# Patient Record
Sex: MALE | Race: BLACK OR AFRICAN AMERICAN | NOT HISPANIC OR LATINO | URBAN - METROPOLITAN AREA
[De-identification: names, ages, dates, MRNs, and addresses within clinical notes are randomized per-mention and may not be internally consistent; named-entity substitution may affect disease eponyms.]

---

## 2017-01-04 ENCOUNTER — EMERGENCY (EMERGENCY)
Facility: HOSPITAL | Age: 47
LOS: 1 days | Discharge: HOME | End: 2017-01-04
Attending: EMERGENCY MEDICINE
Payer: COMMERCIAL

## 2017-01-04 VITALS
DIASTOLIC BLOOD PRESSURE: 80 MMHG | RESPIRATION RATE: 16 BRPM | HEART RATE: 86 BPM | TEMPERATURE: 98 F | SYSTOLIC BLOOD PRESSURE: 121 MMHG | OXYGEN SATURATION: 99 %

## 2017-01-04 VITALS — WEIGHT: 250 LBS | HEIGHT: 70 IN

## 2017-01-04 PROCEDURE — 99282 EMERGENCY DEPT VISIT SF MDM: CPT

## 2017-01-04 PROCEDURE — 99283 EMERGENCY DEPT VISIT LOW MDM: CPT

## 2017-01-04 NOTE — ED STATDOCS - OBJECTIVE STATEMENT
47 y/o M pt presents to ED c/o diarrhea. Pt was seen last week for same symptoms. Pt tried going back to work, but was told to come to ED to get evaluated and get a work note. No fevers.

## 2017-01-04 NOTE — ED ADULT TRIAGE NOTE - CHIEF COMPLAINT QUOTE
pt reports abdominal pain ,nausea vomiting and diarrhea since last week. pt reports being evaluated in ED on 12/27/2016 for the same symptoms.

## 2017-01-04 NOTE — ED STATDOCS - NS ED MD SCRIBE ATTENDING SCRIBE SECTIONS
PHYSICAL EXAM/DISPOSITION/VITAL SIGNS( Pullset)/HISTORY OF PRESENT ILLNESS/PAST MEDICAL/SURGICAL/SOCIAL HISTORY/HIV/REVIEW OF SYSTEMS

## 2018-04-06 ENCOUNTER — INPATIENT (INPATIENT)
Facility: HOSPITAL | Age: 48
LOS: 2 days | Discharge: HOME | DRG: 639 | End: 2018-04-09
Attending: INTERNAL MEDICINE | Admitting: FAMILY MEDICINE
Payer: MEDICAID

## 2018-04-06 VITALS — WEIGHT: 250 LBS | HEIGHT: 70 IN

## 2018-04-06 LAB
ALBUMIN SERPL ELPH-MCNC: 4.7 G/DL — SIGNIFICANT CHANGE UP (ref 3.3–5.2)
ALP SERPL-CCNC: 115 U/L — SIGNIFICANT CHANGE UP (ref 40–120)
ALT FLD-CCNC: 20 U/L — SIGNIFICANT CHANGE UP
ANION GAP SERPL CALC-SCNC: 26 MMOL/L — HIGH (ref 5–17)
AST SERPL-CCNC: 16 U/L — SIGNIFICANT CHANGE UP
BASOPHILS # BLD AUTO: 0 K/UL — SIGNIFICANT CHANGE UP (ref 0–0.2)
BASOPHILS NFR BLD AUTO: 0.1 % — SIGNIFICANT CHANGE UP (ref 0–2)
BILIRUB SERPL-MCNC: 0.4 MG/DL — SIGNIFICANT CHANGE UP (ref 0.4–2)
BUN SERPL-MCNC: 21 MG/DL — HIGH (ref 8–20)
CALCIUM SERPL-MCNC: 10.4 MG/DL — HIGH (ref 8.6–10.2)
CHLORIDE SERPL-SCNC: 90 MMOL/L — LOW (ref 98–107)
CK MB CFR SERPL CALC: 1.9 NG/ML — SIGNIFICANT CHANGE UP (ref 0–6.7)
CK SERPL-CCNC: 215 U/L — HIGH (ref 30–200)
CO2 SERPL-SCNC: 21 MMOL/L — LOW (ref 22–29)
CREAT SERPL-MCNC: 1.48 MG/DL — HIGH (ref 0.5–1.3)
EOSINOPHIL # BLD AUTO: 0 K/UL — SIGNIFICANT CHANGE UP (ref 0–0.5)
EOSINOPHIL NFR BLD AUTO: 0.1 % — SIGNIFICANT CHANGE UP (ref 0–6)
GLUCOSE SERPL-MCNC: 444 MG/DL — HIGH (ref 70–115)
HCT VFR BLD CALC: 47.2 % — SIGNIFICANT CHANGE UP (ref 42–52)
HGB BLD-MCNC: 16.2 G/DL — SIGNIFICANT CHANGE UP (ref 14–18)
LIDOCAIN IGE QN: 64 U/L — HIGH (ref 22–51)
LYMPHOCYTES # BLD AUTO: 1.1 K/UL — SIGNIFICANT CHANGE UP (ref 1–4.8)
LYMPHOCYTES # BLD AUTO: 15.9 % — LOW (ref 20–55)
MCHC RBC-ENTMCNC: 29.5 PG — SIGNIFICANT CHANGE UP (ref 27–31)
MCHC RBC-ENTMCNC: 34.3 G/DL — SIGNIFICANT CHANGE UP (ref 32–36)
MCV RBC AUTO: 85.8 FL — SIGNIFICANT CHANGE UP (ref 80–94)
MONOCYTES # BLD AUTO: 0.3 K/UL — SIGNIFICANT CHANGE UP (ref 0–0.8)
MONOCYTES NFR BLD AUTO: 4.7 % — SIGNIFICANT CHANGE UP (ref 3–10)
NEUTROPHILS # BLD AUTO: 5.4 K/UL — SIGNIFICANT CHANGE UP (ref 1.8–8)
NEUTROPHILS NFR BLD AUTO: 79.1 % — HIGH (ref 37–73)
PLATELET # BLD AUTO: 228 K/UL — SIGNIFICANT CHANGE UP (ref 150–400)
POTASSIUM SERPL-MCNC: 5.1 MMOL/L — SIGNIFICANT CHANGE UP (ref 3.5–5.3)
POTASSIUM SERPL-SCNC: 5.1 MMOL/L — SIGNIFICANT CHANGE UP (ref 3.5–5.3)
PROT SERPL-MCNC: 8.7 G/DL — SIGNIFICANT CHANGE UP (ref 6.6–8.7)
RBC # BLD: 5.5 M/UL — SIGNIFICANT CHANGE UP (ref 4.6–6.2)
RBC # FLD: 12.2 % — SIGNIFICANT CHANGE UP (ref 11–15.6)
SODIUM SERPL-SCNC: 137 MMOL/L — SIGNIFICANT CHANGE UP (ref 135–145)
TROPONIN T SERPL-MCNC: <0.01 NG/ML — SIGNIFICANT CHANGE UP (ref 0–0.06)
WBC # BLD: 6.8 K/UL — SIGNIFICANT CHANGE UP (ref 4.8–10.8)
WBC # FLD AUTO: 6.8 K/UL — SIGNIFICANT CHANGE UP (ref 4.8–10.8)

## 2018-04-06 PROCEDURE — 99285 EMERGENCY DEPT VISIT HI MDM: CPT

## 2018-04-06 PROCEDURE — 71046 X-RAY EXAM CHEST 2 VIEWS: CPT | Mod: 26

## 2018-04-06 PROCEDURE — 93010 ELECTROCARDIOGRAM REPORT: CPT

## 2018-04-06 RX ORDER — SODIUM CHLORIDE 9 MG/ML
1000 INJECTION INTRAMUSCULAR; INTRAVENOUS; SUBCUTANEOUS ONCE
Qty: 0 | Refills: 0 | Status: COMPLETED | OUTPATIENT
Start: 2018-04-06 | End: 2018-04-06

## 2018-04-06 RX ORDER — INSULIN GLARGINE 100 [IU]/ML
15 INJECTION, SOLUTION SUBCUTANEOUS ONCE
Qty: 0 | Refills: 0 | Status: COMPLETED | OUTPATIENT
Start: 2018-04-06 | End: 2018-04-06

## 2018-04-06 RX ORDER — INSULIN HUMAN 100 [IU]/ML
5 INJECTION, SOLUTION SUBCUTANEOUS
Qty: 100 | Refills: 0 | Status: DISCONTINUED | OUTPATIENT
Start: 2018-04-06 | End: 2018-04-07

## 2018-04-06 RX ADMIN — SODIUM CHLORIDE 1000 MILLILITER(S): 9 INJECTION INTRAMUSCULAR; INTRAVENOUS; SUBCUTANEOUS at 17:47

## 2018-04-06 RX ADMIN — INSULIN HUMAN 5 UNIT(S)/HR: 100 INJECTION, SOLUTION SUBCUTANEOUS at 20:41

## 2018-04-06 RX ADMIN — SODIUM CHLORIDE 2000 MILLILITER(S): 9 INJECTION INTRAMUSCULAR; INTRAVENOUS; SUBCUTANEOUS at 20:42

## 2018-04-06 RX ADMIN — SODIUM CHLORIDE 2000 MILLILITER(S): 9 INJECTION INTRAMUSCULAR; INTRAVENOUS; SUBCUTANEOUS at 17:47

## 2018-04-06 RX ADMIN — INSULIN GLARGINE 15 UNIT(S): 100 INJECTION, SOLUTION SUBCUTANEOUS at 20:41

## 2018-04-06 NOTE — ED PROVIDER NOTE - PROGRESS NOTE DETAILS
PT. was seen and evaluated by ICU PA-Beck Montejo. He agreed with IV fluids and insulin drip. Also recommended Lantus SC. PT. will have repeat chemistry to evaluate Anion gap. PT. well appearing. NO distress. Awaiting repeat labs. Luis: received sign out; labs improving; insulin gtt turned off; d/w hospitalist will admit to floor

## 2018-04-06 NOTE — ED ADULT NURSE REASSESSMENT NOTE - NS ED NURSE REASSESS COMMENT FT1
Patient resting in bed, awake, alert and oriented X3, resp even/unlabored, lungs CTAB, patient C/O mild dizziness, BS @[phone], awaiting for dispo, will continue to monitor patient.

## 2018-04-06 NOTE — ED ADULT NURSE REASSESSMENT NOTE - NS ED NURSE REASSESS COMMENT FT1
patient aox4 comfortable in appearance. respirations clear equal and unlabored. heart sounds s1 s2  WDL, abdomen soft nontender + bowel sounds all 4 quadrants, moves all extremities. + pulse all 4 extremities. + sensation all 4 extremities. patient and family updated on plan of care. patient and family educated on hourly rounding procedures and understands call bell system.

## 2018-04-06 NOTE — ED STATDOCS - OBJECTIVE STATEMENT
48 yo M pmh DM2 ( diet controlled) presented to ED with multiple vague complaints.   Pt reports blurry vision, nonspecific CP, Abd pain,  body aches, and urinary frequency x 2 weeks. Pt states that he believes is "diabetes is flaring up"  Pt states he was on Metformin 10 years ago but took himself off.   Pt denies any HA, neck pain, fevers/chills, palp, SOB, or  diaphoresis.  + Smoker + occasional ETOH and + occasional THC + N/V. No diarrhea, but reports constipation. No cough. Accucheck 400. Will send to main for further eval and monitoring

## 2018-04-06 NOTE — ED PROVIDER NOTE - OBJECTIVE STATEMENT
48 y/o M pt with hx of DM presents to ED c/o polydipsia, blurred vision, polyuria, dizziness, HA and dry mouth that began 3-4 weeks ago. Pt was on metformin and stopped taking it 9 years ago. He has tried to control DM with diet. Pt states he has had mild CP for months. Vomiting x 2 weeks, no emesis today. Denies abd pain and SOB. No further complaints at this time. 46 y/o M pt with hx of DM presents to ED c/o polydipsia, polyuria, blurred vision, dizziness, HA and dry mouth that began 3-4 weeks ago. Pt notes intermittent emesis for 2 weeks but did not have any episodes today. Pt states he has had mild CP for months. Pt was on metformin and stopped taking it 9 years ago. He has tried to control DM with diet.  Denies abdominal pain and SOB. No further complaints at this time.

## 2018-04-06 NOTE — ED STATDOCS - ATTENDING CONTRIBUTION TO CARE
Dr. Lopes : I have personally seen and examined this patient at the bedside. I have fully participated in the care of this patient. I have reviewed all pertinent clinical information, including history, physical exam, plan and the PA's note and agree except as noted. Dr. Lopes : I have reviewed all pertinent clinical information, including history, physical exam, plan and the PA's note and agree except as noted.

## 2018-04-07 ENCOUNTER — TRANSCRIPTION ENCOUNTER (OUTPATIENT)
Age: 48
End: 2018-04-07

## 2018-04-07 DIAGNOSIS — E11.65 TYPE 2 DIABETES MELLITUS WITH HYPERGLYCEMIA: ICD-10-CM

## 2018-04-07 DIAGNOSIS — E86.0 DEHYDRATION: ICD-10-CM

## 2018-04-07 DIAGNOSIS — E13.10 OTHER SPECIFIED DIABETES MELLITUS WITH KETOACIDOSIS WITHOUT COMA: ICD-10-CM

## 2018-04-07 DIAGNOSIS — E87.2 ACIDOSIS: ICD-10-CM

## 2018-04-07 DIAGNOSIS — R07.89 OTHER CHEST PAIN: ICD-10-CM

## 2018-04-07 LAB
ACETONE SERPL-MCNC: ABNORMAL
ANION GAP SERPL CALC-SCNC: 16 MMOL/L — SIGNIFICANT CHANGE UP (ref 5–17)
ANION GAP SERPL CALC-SCNC: 18 MMOL/L — HIGH (ref 5–17)
ANION GAP SERPL CALC-SCNC: 19 MMOL/L — HIGH (ref 5–17)
ANION GAP SERPL CALC-SCNC: 19 MMOL/L — HIGH (ref 5–17)
ANION GAP SERPL CALC-SCNC: 20 MMOL/L — HIGH (ref 5–17)
APPEARANCE UR: CLEAR — SIGNIFICANT CHANGE UP
APTT BLD: 26.3 SEC — LOW (ref 27.5–37.4)
BASOPHILS # BLD AUTO: 0 K/UL — SIGNIFICANT CHANGE UP (ref 0–0.2)
BASOPHILS # BLD AUTO: 0 K/UL — SIGNIFICANT CHANGE UP (ref 0–0.2)
BASOPHILS NFR BLD AUTO: 0.2 % — SIGNIFICANT CHANGE UP (ref 0–2)
BASOPHILS NFR BLD AUTO: 0.4 % — SIGNIFICANT CHANGE UP (ref 0–2)
BILIRUB UR-MCNC: NEGATIVE — SIGNIFICANT CHANGE UP
BUN SERPL-MCNC: 12 MG/DL — SIGNIFICANT CHANGE UP (ref 8–20)
BUN SERPL-MCNC: 14 MG/DL — SIGNIFICANT CHANGE UP (ref 8–20)
BUN SERPL-MCNC: 15 MG/DL — SIGNIFICANT CHANGE UP (ref 8–20)
BUN SERPL-MCNC: 15 MG/DL — SIGNIFICANT CHANGE UP (ref 8–20)
BUN SERPL-MCNC: 16 MG/DL — SIGNIFICANT CHANGE UP (ref 8–20)
CALCIUM SERPL-MCNC: 8.4 MG/DL — LOW (ref 8.6–10.2)
CALCIUM SERPL-MCNC: 8.6 MG/DL — SIGNIFICANT CHANGE UP (ref 8.6–10.2)
CALCIUM SERPL-MCNC: 8.8 MG/DL — SIGNIFICANT CHANGE UP (ref 8.6–10.2)
CALCIUM SERPL-MCNC: 8.8 MG/DL — SIGNIFICANT CHANGE UP (ref 8.6–10.2)
CALCIUM SERPL-MCNC: 9.1 MG/DL — SIGNIFICANT CHANGE UP (ref 8.6–10.2)
CHLORIDE SERPL-SCNC: 100 MMOL/L — SIGNIFICANT CHANGE UP (ref 98–107)
CHLORIDE SERPL-SCNC: 97 MMOL/L — LOW (ref 98–107)
CHLORIDE SERPL-SCNC: 99 MMOL/L — SIGNIFICANT CHANGE UP (ref 98–107)
CK MB CFR SERPL CALC: 1.4 NG/ML — SIGNIFICANT CHANGE UP (ref 0–6.7)
CK SERPL-CCNC: 153 U/L — SIGNIFICANT CHANGE UP (ref 30–200)
CO2 SERPL-SCNC: 20 MMOL/L — LOW (ref 22–29)
CO2 SERPL-SCNC: 21 MMOL/L — LOW (ref 22–29)
COLOR SPEC: YELLOW — SIGNIFICANT CHANGE UP
CREAT SERPL-MCNC: 1.03 MG/DL — SIGNIFICANT CHANGE UP (ref 0.5–1.3)
CREAT SERPL-MCNC: 1.04 MG/DL — SIGNIFICANT CHANGE UP (ref 0.5–1.3)
CREAT SERPL-MCNC: 1.18 MG/DL — SIGNIFICANT CHANGE UP (ref 0.5–1.3)
CREAT SERPL-MCNC: 1.21 MG/DL — SIGNIFICANT CHANGE UP (ref 0.5–1.3)
CREAT SERPL-MCNC: 1.27 MG/DL — SIGNIFICANT CHANGE UP (ref 0.5–1.3)
DIFF PNL FLD: NEGATIVE — SIGNIFICANT CHANGE UP
EOSINOPHIL # BLD AUTO: 0 K/UL — SIGNIFICANT CHANGE UP (ref 0–0.5)
EOSINOPHIL # BLD AUTO: 0 K/UL — SIGNIFICANT CHANGE UP (ref 0–0.5)
EOSINOPHIL NFR BLD AUTO: 0.4 % — SIGNIFICANT CHANGE UP (ref 0–6)
EOSINOPHIL NFR BLD AUTO: 0.9 % — SIGNIFICANT CHANGE UP (ref 0–6)
EPI CELLS # UR: SIGNIFICANT CHANGE UP
GLUCOSE BLDC GLUCOMTR-MCNC: 175 MG/DL — HIGH (ref 70–99)
GLUCOSE BLDC GLUCOMTR-MCNC: 189 MG/DL — HIGH (ref 70–99)
GLUCOSE BLDC GLUCOMTR-MCNC: 189 MG/DL — HIGH (ref 70–99)
GLUCOSE BLDC GLUCOMTR-MCNC: 200 MG/DL — HIGH (ref 70–99)
GLUCOSE BLDC GLUCOMTR-MCNC: 245 MG/DL — HIGH (ref 70–99)
GLUCOSE BLDC GLUCOMTR-MCNC: 312 MG/DL — HIGH (ref 70–99)
GLUCOSE BLDC GLUCOMTR-MCNC: 328 MG/DL — HIGH (ref 70–99)
GLUCOSE SERPL-MCNC: 182 MG/DL — HIGH (ref 70–115)
GLUCOSE SERPL-MCNC: 191 MG/DL — HIGH (ref 70–115)
GLUCOSE SERPL-MCNC: 216 MG/DL — HIGH (ref 70–115)
GLUCOSE SERPL-MCNC: 222 MG/DL — HIGH (ref 70–115)
GLUCOSE SERPL-MCNC: 326 MG/DL — HIGH (ref 70–115)
GLUCOSE UR QL: 1000 MG/DL
HCT VFR BLD CALC: 36.9 % — LOW (ref 42–52)
HCT VFR BLD CALC: 40.4 % — LOW (ref 42–52)
HGB BLD-MCNC: 12.6 G/DL — LOW (ref 14–18)
HGB BLD-MCNC: 13.7 G/DL — LOW (ref 14–18)
INR BLD: 1.04 RATIO — SIGNIFICANT CHANGE UP (ref 0.88–1.16)
KETONES UR-MCNC: ABNORMAL
LEUKOCYTE ESTERASE UR-ACNC: ABNORMAL
LYMPHOCYTES # BLD AUTO: 1.1 K/UL — SIGNIFICANT CHANGE UP (ref 1–4.8)
LYMPHOCYTES # BLD AUTO: 1.4 K/UL — SIGNIFICANT CHANGE UP (ref 1–4.8)
LYMPHOCYTES # BLD AUTO: 23.7 % — SIGNIFICANT CHANGE UP (ref 20–55)
LYMPHOCYTES # BLD AUTO: 29.9 % — SIGNIFICANT CHANGE UP (ref 20–55)
MAGNESIUM SERPL-MCNC: 1.6 MG/DL — SIGNIFICANT CHANGE UP (ref 1.6–2.6)
MAGNESIUM SERPL-MCNC: 1.9 MG/DL — SIGNIFICANT CHANGE UP (ref 1.6–2.6)
MAGNESIUM SERPL-MCNC: 1.9 MG/DL — SIGNIFICANT CHANGE UP (ref 1.6–2.6)
MAGNESIUM SERPL-MCNC: 2.2 MG/DL — SIGNIFICANT CHANGE UP (ref 1.6–2.6)
MCHC RBC-ENTMCNC: 29.7 PG — SIGNIFICANT CHANGE UP (ref 27–31)
MCHC RBC-ENTMCNC: 29.8 PG — SIGNIFICANT CHANGE UP (ref 27–31)
MCHC RBC-ENTMCNC: 33.9 G/DL — SIGNIFICANT CHANGE UP (ref 32–36)
MCHC RBC-ENTMCNC: 34.1 G/DL — SIGNIFICANT CHANGE UP (ref 32–36)
MCV RBC AUTO: 87.2 FL — SIGNIFICANT CHANGE UP (ref 80–94)
MCV RBC AUTO: 87.4 FL — SIGNIFICANT CHANGE UP (ref 80–94)
MONOCYTES # BLD AUTO: 0.4 K/UL — SIGNIFICANT CHANGE UP (ref 0–0.8)
MONOCYTES # BLD AUTO: 0.4 K/UL — SIGNIFICANT CHANGE UP (ref 0–0.8)
MONOCYTES NFR BLD AUTO: 8.6 % — SIGNIFICANT CHANGE UP (ref 3–10)
MONOCYTES NFR BLD AUTO: 9 % — SIGNIFICANT CHANGE UP (ref 3–10)
NEUTROPHILS # BLD AUTO: 2.8 K/UL — SIGNIFICANT CHANGE UP (ref 1.8–8)
NEUTROPHILS # BLD AUTO: 3 K/UL — SIGNIFICANT CHANGE UP (ref 1.8–8)
NEUTROPHILS NFR BLD AUTO: 60.5 % — SIGNIFICANT CHANGE UP (ref 37–73)
NEUTROPHILS NFR BLD AUTO: 66.2 % — SIGNIFICANT CHANGE UP (ref 37–73)
NITRITE UR-MCNC: NEGATIVE — SIGNIFICANT CHANGE UP
OSMOLALITY SERPL: 301 MOSM/KG — HIGH (ref 280–300)
PH UR: 5 — SIGNIFICANT CHANGE UP (ref 5–8)
PHOSPHATE SERPL-MCNC: 2 MG/DL — LOW (ref 2.4–4.7)
PHOSPHATE SERPL-MCNC: 2.7 MG/DL — SIGNIFICANT CHANGE UP (ref 2.4–4.7)
PHOSPHATE SERPL-MCNC: 3 MG/DL — SIGNIFICANT CHANGE UP (ref 2.4–4.7)
PHOSPHATE SERPL-MCNC: 3.5 MG/DL — SIGNIFICANT CHANGE UP (ref 2.4–4.7)
PLATELET # BLD AUTO: 162 K/UL — SIGNIFICANT CHANGE UP (ref 150–400)
PLATELET # BLD AUTO: 174 K/UL — SIGNIFICANT CHANGE UP (ref 150–400)
POTASSIUM SERPL-MCNC: 3.8 MMOL/L — SIGNIFICANT CHANGE UP (ref 3.5–5.3)
POTASSIUM SERPL-MCNC: 4 MMOL/L — SIGNIFICANT CHANGE UP (ref 3.5–5.3)
POTASSIUM SERPL-MCNC: 4.1 MMOL/L — SIGNIFICANT CHANGE UP (ref 3.5–5.3)
POTASSIUM SERPL-MCNC: 4.2 MMOL/L — SIGNIFICANT CHANGE UP (ref 3.5–5.3)
POTASSIUM SERPL-MCNC: 4.2 MMOL/L — SIGNIFICANT CHANGE UP (ref 3.5–5.3)
POTASSIUM SERPL-SCNC: 3.8 MMOL/L — SIGNIFICANT CHANGE UP (ref 3.5–5.3)
POTASSIUM SERPL-SCNC: 4 MMOL/L — SIGNIFICANT CHANGE UP (ref 3.5–5.3)
POTASSIUM SERPL-SCNC: 4.1 MMOL/L — SIGNIFICANT CHANGE UP (ref 3.5–5.3)
POTASSIUM SERPL-SCNC: 4.2 MMOL/L — SIGNIFICANT CHANGE UP (ref 3.5–5.3)
POTASSIUM SERPL-SCNC: 4.2 MMOL/L — SIGNIFICANT CHANGE UP (ref 3.5–5.3)
PROT UR-MCNC: 30 MG/DL
PROTHROM AB SERPL-ACNC: 11.5 SEC — SIGNIFICANT CHANGE UP (ref 9.8–12.7)
RBC # BLD: 4.23 M/UL — LOW (ref 4.6–6.2)
RBC # BLD: 4.62 M/UL — SIGNIFICANT CHANGE UP (ref 4.6–6.2)
RBC # FLD: 12.3 % — SIGNIFICANT CHANGE UP (ref 11–15.6)
RBC # FLD: 12.4 % — SIGNIFICANT CHANGE UP (ref 11–15.6)
RBC CASTS # UR COMP ASSIST: NEGATIVE /HPF — SIGNIFICANT CHANGE UP (ref 0–4)
SODIUM SERPL-SCNC: 136 MMOL/L — SIGNIFICANT CHANGE UP (ref 135–145)
SODIUM SERPL-SCNC: 137 MMOL/L — SIGNIFICANT CHANGE UP (ref 135–145)
SODIUM SERPL-SCNC: 138 MMOL/L — SIGNIFICANT CHANGE UP (ref 135–145)
SODIUM SERPL-SCNC: 139 MMOL/L — SIGNIFICANT CHANGE UP (ref 135–145)
SODIUM SERPL-SCNC: 139 MMOL/L — SIGNIFICANT CHANGE UP (ref 135–145)
SP GR SPEC: 1.02 — SIGNIFICANT CHANGE UP (ref 1.01–1.02)
TROPONIN T SERPL-MCNC: <0.01 NG/ML — SIGNIFICANT CHANGE UP (ref 0–0.06)
TROPONIN T SERPL-MCNC: <0.01 NG/ML — SIGNIFICANT CHANGE UP (ref 0–0.06)
UROBILINOGEN FLD QL: NEGATIVE MG/DL — SIGNIFICANT CHANGE UP
WBC # BLD: 4.6 K/UL — LOW (ref 4.8–10.8)
WBC # BLD: 4.6 K/UL — LOW (ref 4.8–10.8)
WBC # FLD AUTO: 4.6 K/UL — LOW (ref 4.8–10.8)
WBC # FLD AUTO: 4.6 K/UL — LOW (ref 4.8–10.8)
WBC UR QL: SIGNIFICANT CHANGE UP

## 2018-04-07 PROCEDURE — 99497 ADVNCD CARE PLAN 30 MIN: CPT | Mod: 25

## 2018-04-07 PROCEDURE — 99255 IP/OBS CONSLTJ NEW/EST HI 80: CPT

## 2018-04-07 PROCEDURE — 99223 1ST HOSP IP/OBS HIGH 75: CPT | Mod: GC

## 2018-04-07 PROCEDURE — 12345: CPT | Mod: GC,NC

## 2018-04-07 PROCEDURE — 93010 ELECTROCARDIOGRAM REPORT: CPT

## 2018-04-07 PROCEDURE — 93306 TTE W/DOPPLER COMPLETE: CPT | Mod: 26

## 2018-04-07 RX ORDER — SODIUM CHLORIDE 9 MG/ML
1000 INJECTION, SOLUTION INTRAVENOUS
Qty: 0 | Refills: 0 | Status: DISCONTINUED | OUTPATIENT
Start: 2018-04-07 | End: 2018-04-07

## 2018-04-07 RX ORDER — ENOXAPARIN SODIUM 100 MG/ML
40 INJECTION SUBCUTANEOUS DAILY
Qty: 0 | Refills: 0 | Status: DISCONTINUED | OUTPATIENT
Start: 2018-04-07 | End: 2018-04-09

## 2018-04-07 RX ORDER — SODIUM CHLORIDE 9 MG/ML
500 INJECTION, SOLUTION INTRAVENOUS ONCE
Qty: 0 | Refills: 0 | Status: DISCONTINUED | OUTPATIENT
Start: 2018-04-07 | End: 2018-04-07

## 2018-04-07 RX ORDER — INSULIN GLARGINE 100 [IU]/ML
5 INJECTION, SOLUTION SUBCUTANEOUS ONCE
Qty: 0 | Refills: 0 | Status: COMPLETED | OUTPATIENT
Start: 2018-04-07 | End: 2018-04-07

## 2018-04-07 RX ORDER — ACETAMINOPHEN 500 MG
650 TABLET ORAL EVERY 6 HOURS
Qty: 0 | Refills: 0 | Status: DISCONTINUED | OUTPATIENT
Start: 2018-04-07 | End: 2018-04-09

## 2018-04-07 RX ORDER — DEXTROSE 50 % IN WATER 50 %
25 SYRINGE (ML) INTRAVENOUS ONCE
Qty: 0 | Refills: 0 | Status: DISCONTINUED | OUTPATIENT
Start: 2018-04-07 | End: 2018-04-09

## 2018-04-07 RX ORDER — INSULIN LISPRO 100/ML
VIAL (ML) SUBCUTANEOUS AT BEDTIME
Qty: 0 | Refills: 0 | Status: DISCONTINUED | OUTPATIENT
Start: 2018-04-07 | End: 2018-04-09

## 2018-04-07 RX ORDER — PANTOPRAZOLE SODIUM 20 MG/1
40 TABLET, DELAYED RELEASE ORAL
Qty: 0 | Refills: 0 | Status: DISCONTINUED | OUTPATIENT
Start: 2018-04-07 | End: 2018-04-09

## 2018-04-07 RX ORDER — SODIUM CHLORIDE 9 MG/ML
1000 INJECTION INTRAMUSCULAR; INTRAVENOUS; SUBCUTANEOUS ONCE
Qty: 0 | Refills: 0 | Status: COMPLETED | OUTPATIENT
Start: 2018-04-07 | End: 2018-04-07

## 2018-04-07 RX ORDER — INSULIN LISPRO 100/ML
4 VIAL (ML) SUBCUTANEOUS ONCE
Qty: 0 | Refills: 0 | Status: COMPLETED | OUTPATIENT
Start: 2018-04-07 | End: 2018-04-07

## 2018-04-07 RX ORDER — DEXTROSE 50 % IN WATER 50 %
12.5 SYRINGE (ML) INTRAVENOUS ONCE
Qty: 0 | Refills: 0 | Status: DISCONTINUED | OUTPATIENT
Start: 2018-04-07 | End: 2018-04-09

## 2018-04-07 RX ORDER — INSULIN GLARGINE 100 [IU]/ML
10 INJECTION, SOLUTION SUBCUTANEOUS AT BEDTIME
Qty: 0 | Refills: 0 | Status: DISCONTINUED | OUTPATIENT
Start: 2018-04-07 | End: 2018-04-07

## 2018-04-07 RX ORDER — ONDANSETRON 8 MG/1
4 TABLET, FILM COATED ORAL EVERY 4 HOURS
Qty: 0 | Refills: 0 | Status: DISCONTINUED | OUTPATIENT
Start: 2018-04-07 | End: 2018-04-09

## 2018-04-07 RX ORDER — INSULIN GLARGINE 100 [IU]/ML
24 INJECTION, SOLUTION SUBCUTANEOUS AT BEDTIME
Qty: 0 | Refills: 0 | Status: DISCONTINUED | OUTPATIENT
Start: 2018-04-07 | End: 2018-04-08

## 2018-04-07 RX ORDER — SODIUM CHLORIDE 9 MG/ML
1000 INJECTION, SOLUTION INTRAVENOUS
Qty: 0 | Refills: 0 | Status: DISCONTINUED | OUTPATIENT
Start: 2018-04-07 | End: 2018-04-08

## 2018-04-07 RX ORDER — INSULIN LISPRO 100/ML
2 VIAL (ML) SUBCUTANEOUS ONCE
Qty: 0 | Refills: 0 | Status: COMPLETED | OUTPATIENT
Start: 2018-04-07 | End: 2018-04-07

## 2018-04-07 RX ORDER — SODIUM CHLORIDE 9 MG/ML
500 INJECTION INTRAMUSCULAR; INTRAVENOUS; SUBCUTANEOUS ONCE
Qty: 0 | Refills: 0 | Status: COMPLETED | OUTPATIENT
Start: 2018-04-07 | End: 2018-04-07

## 2018-04-07 RX ORDER — INSULIN GLARGINE 100 [IU]/ML
2 INJECTION, SOLUTION SUBCUTANEOUS ONCE
Qty: 0 | Refills: 0 | Status: COMPLETED | OUTPATIENT
Start: 2018-04-07 | End: 2018-04-07

## 2018-04-07 RX ORDER — GLUCAGON INJECTION, SOLUTION 0.5 MG/.1ML
1 INJECTION, SOLUTION SUBCUTANEOUS ONCE
Qty: 0 | Refills: 0 | Status: DISCONTINUED | OUTPATIENT
Start: 2018-04-07 | End: 2018-04-09

## 2018-04-07 RX ORDER — INSULIN LISPRO 100/ML
VIAL (ML) SUBCUTANEOUS
Qty: 0 | Refills: 0 | Status: DISCONTINUED | OUTPATIENT
Start: 2018-04-07 | End: 2018-04-09

## 2018-04-07 RX ORDER — SODIUM CHLORIDE 9 MG/ML
1000 INJECTION, SOLUTION INTRAVENOUS
Qty: 0 | Refills: 0 | Status: DISCONTINUED | OUTPATIENT
Start: 2018-04-07 | End: 2018-04-09

## 2018-04-07 RX ORDER — DEXTROSE 50 % IN WATER 50 %
1 SYRINGE (ML) INTRAVENOUS ONCE
Qty: 0 | Refills: 0 | Status: DISCONTINUED | OUTPATIENT
Start: 2018-04-07 | End: 2018-04-09

## 2018-04-07 RX ORDER — INSULIN LISPRO 100/ML
8 VIAL (ML) SUBCUTANEOUS
Qty: 0 | Refills: 0 | Status: DISCONTINUED | OUTPATIENT
Start: 2018-04-07 | End: 2018-04-09

## 2018-04-07 RX ADMIN — Medication 2: at 22:06

## 2018-04-07 RX ADMIN — SODIUM CHLORIDE 150 MILLILITER(S): 9 INJECTION, SOLUTION INTRAVENOUS at 03:59

## 2018-04-07 RX ADMIN — INSULIN GLARGINE 24 UNIT(S): 100 INJECTION, SOLUTION SUBCUTANEOUS at 22:05

## 2018-04-07 RX ADMIN — Medication 2: at 07:58

## 2018-04-07 RX ADMIN — ENOXAPARIN SODIUM 40 MILLIGRAM(S): 100 INJECTION SUBCUTANEOUS at 11:21

## 2018-04-07 RX ADMIN — Medication 2 UNIT(S): at 06:20

## 2018-04-07 RX ADMIN — SODIUM CHLORIDE 150 MILLILITER(S): 9 INJECTION, SOLUTION INTRAVENOUS at 08:00

## 2018-04-07 RX ADMIN — INSULIN GLARGINE 5 UNIT(S): 100 INJECTION, SOLUTION SUBCUTANEOUS at 04:58

## 2018-04-07 RX ADMIN — SODIUM CHLORIDE 1000 MILLILITER(S): 9 INJECTION INTRAMUSCULAR; INTRAVENOUS; SUBCUTANEOUS at 07:35

## 2018-04-07 RX ADMIN — SODIUM CHLORIDE 4000 MILLILITER(S): 9 INJECTION INTRAMUSCULAR; INTRAVENOUS; SUBCUTANEOUS at 04:59

## 2018-04-07 RX ADMIN — INSULIN GLARGINE 2 UNIT(S): 100 INJECTION, SOLUTION SUBCUTANEOUS at 06:20

## 2018-04-07 RX ADMIN — Medication 4 UNIT(S): at 01:58

## 2018-04-07 RX ADMIN — Medication 4: at 11:21

## 2018-04-07 RX ADMIN — PANTOPRAZOLE SODIUM 40 MILLIGRAM(S): 20 TABLET, DELAYED RELEASE ORAL at 16:36

## 2018-04-07 RX ADMIN — Medication 8: at 16:34

## 2018-04-07 RX ADMIN — SODIUM CHLORIDE 125 MILLILITER(S): 9 INJECTION, SOLUTION INTRAVENOUS at 11:20

## 2018-04-07 RX ADMIN — Medication 4 UNIT(S): at 03:58

## 2018-04-07 NOTE — CHART NOTE - NSCHARTNOTEFT_GEN_A_CORE
Patient seen and examined at bedside. No acute events overnight. Patient states he is feeling much better, felt light headed this morning but after receiving his breakfast he feels much better. Patient denies chest pain, SOB, abd pain, N/V, fever, chills, dysuria or any other complaints. All remainder ROS negative.       Vital Signs Last 24 Hrs  T(C): 37.2 (2018 11:41), Max: 37.2 (2018 11:41)  T(F): 98.9 (2018 11:41), Max: 98.9 (2018 11:41)  HR: 76 (2018 11:41) (59 - 94)  BP: 138/66 (2018 11:41) (114/79 - 138/66)  BP(mean): --  RR: 15 (2018 11:41) (15 - 18)  SpO2: 96% (2018 11:41) (95% - 100%)      Physical Exam:  Constitutional: WD WN NAD   Cardio: s1s2 RRR No MRG  Resp: CTA b/l no WRR  Gastro: soft, nt nd bs + normoactive  Ext: no edema, cyanosis or deformity       A/P: 47M hx DM was on metformin years ago, stopped after his glycemic control was improved with diet and exercise and has had no follow up with a PMD, currently p/w more than 2 weeks of not feeling well; symptoms of uncontrolled hyperglycemia polyuria/polydipsia and most recently the last few days with nausea, vomiting (nb nb), inability to tolerate po intake and dizziness. Noted to have hyperglycemia fs~400 range, A, ketones in the urine and acetone in the serum. Pt admitted with DKA placed on IVF, insulin drip for which ICU consulted but deemed the pt not a candidate for the ICU if AG continued to improve. AG trended down wards and pt clinically with improved FS. Pt was taken off the insulin drip, bridged with lantus and humalog, thus far received lantus 15 u sq qhs last night and 7 this am and continues on HSS, FS ACHS TID. HBA1C: 14.8, given N/V uncontrolled fs starvation ketosis also contributing to current sx. Initiated diet and pt tolerated well. Will maintain on IVF for now for 12 more hours. Repeat labs to be completed at 2pm. Endocrine consulted, counseled patient extensively on continuing and being educated on injectable insulin. Patient is in agreement with the plan of care. Pt also has no insurance and recommended for him to follow at H when fs better controlled for further DM management. Noted elevated bp up to sbp 130's will not initiate bp medications unless pt remains high. If so given proteinuria on UA and uncontrolled DM will start low dose ace inhibitor. Pt also with burning chest pain a few weeks ago, reports that he has intermittent chest pain associates it being worse with food seems atypical and possible GERD related; pt started on PPI troponin thus far negative. Tele and ekg shows non specific st changes mostly in the anterolateral leads; pt reports fam hx of heart dz but unclear if MI related. Given pt is an uncontrolled DM and ekg changes cardio consulted, pending eval and tte ordered. DVT ppx.

## 2018-04-07 NOTE — H&P ADULT - NSHPREVIEWOFSYSTEMS_GEN_ALL_CORE
REVIEW OF SYSTEMS:    CONSTITUTIONAL: No weakness, fevers or chills  EYES/ENT: No visual changes;  No vertigo or throat pain   NECK: No pain or stiffness  RESPIRATORY: No cough, wheezing, hemoptysis; No shortness of breath  CARDIOVASCULAR: No chest pain or palpitations  GASTROINTESTINAL: POSITIVE NAUSEA, VOMITING  GENITOURINARY: No dysuria;  POSITIIVE POLYURIA;    NEUROLOGICAL: No numbness or weakness  SKIN: No itching, burning, rashes, or lesions . REVIEW OF SYSTEMS:    CONSTITUTIONAL: no fever;  POSITIVE FATIGUE;    EYES/ENT: No visual changes;  No vertigo or throat pain   NECK: No pain or stiffness  RESPIRATORY: No cough, wheezing, hemoptysis; No shortness of breath  CARDIOVASCULAR: No chest pain or palpitations  GASTROINTESTINAL: POSITIVE NAUSEA, VOMITING  GENITOURINARY: No dysuria;  POSITIVE POLYURIA;    NEUROLOGICAL: No numbness or weakness  DERMATOLOGY: No itching, burning, rashes, or lesions .

## 2018-04-07 NOTE — DISCHARGE NOTE ADULT - ADDITIONAL INSTRUCTIONS
Please follow up with your PMD in 1-2 days  Follow up with the endocrinologist within 1 week. Please follow up at [facility] clinic on April 11, 2017 at 10:45 with Dr Vergara.  Follow up with the endocrinologist within 1-2 weeks for medication optimization.

## 2018-04-07 NOTE — H&P ADULT - NSHPPHYSICALEXAM_GEN_ALL_CORE
PHYSICAL EXAM:  Vital Signs Last 24 Hrs  T(C): 36.7 (07 Apr 2018 02:37), Max: 36.7 (06 Apr 2018 16:30)  T(F): 98.1 (07 Apr 2018 02:37), Max: 98.1 (06 Apr 2018 16:30)  HR: 59 (07 Apr 2018 02:37) (59 - 94)  BP: 134/72 (07 Apr 2018 02:37) (114/79 - 134/72)  RR: 18 (07 Apr 2018 02:37) (18 - 18)  SpO2: 98% (07 Apr 2018 02:37) (95% - 100%)    GENERAL: NAD, well-groomed, well-developed  HEAD:  Atraumatic, Normocephalic  EYES: EOMI, PERRLA, conjunctiva and sclera clear  ENMT: No tonsillar erythema, exudates, or enlargement; Dry oral mucous membranes, Good dentition, No lesions  NECK: Supple, No JVD, Normal thyroid  NERVOUS SYSTEM:  Alert & Oriented X3, Good concentration; Motor Strength 5/5 B/L upper and lower extremities; DTRs 2+ intact and symmetric  PULMONOLOGY: : Clear to percussion bilaterally; No rales, rhonchi, wheezing, or rubs  CARDIOLOGY: S1, S2, Regular rate and rhythm; No murmurs, rubs, or gallops  GASTROENTEROLOGY:  Soft, Nontender, Nondistended; Bowel sounds present  EXTREMITIES:  2+ Peripheral Pulses, No clubbing, cyanosis, or edema  LYMPH: No lymphadenopathy noted  DERMATOLOGY:  No rashes or lesions; dry skin of feet, no breaks in the skin b/w the feet or ulcers on the heel;

## 2018-04-07 NOTE — PATIENT PROFILE ADULT. - NS TRANSFER PATIENT BELONGINGS
Other belongings/Cell Phone/PDA (specify)/wallet ID, blue drawstring bag, brown sweat pants/Clothing

## 2018-04-07 NOTE — DISCHARGE NOTE ADULT - PROVIDER TOKENS
FREE:[LAST:[HR],FIRST:[Clinic],PHONE:[(   )    -],FAX:[(   )    -],ADDRESS:[[place] [address]  Phone: [phone]  Fax: [phone]]],TOKEN:'[id number]'

## 2018-04-07 NOTE — H&P ADULT - FAMILY HISTORY
Mother  Still living? Unknown  Family history of ischemic heart disease, Age at diagnosis: Age Unknown  Family history of diabetes mellitus, Age at diagnosis: Age Unknown Family history of ischemic heart disease     Mother  Still living? Unknown  Family history of diabetes mellitus, Age at diagnosis: Age Unknown

## 2018-04-07 NOTE — H&P ADULT - NSHPLABSRESULTS_GEN_ALL_CORE
Lab Results:  CBC Full  -  ( 2018 17:43 )  WBC Count : 6.8 K/uL  Hemoglobin : 16.2 g/dL  Hematocrit : 47.2 %  Platelet Count - Automated : 228 K/uL  Mean Cell Volume : 85.8 fl  Mean Cell Hemoglobin : 29.5 pg  Mean Cell Hemoglobin Concentration : 34.3 g/dL  Auto Neutrophil # : 5.4 K/uL  Auto Lymphocyte # : 1.1 K/uL  Auto Monocyte # : 0.3 K/uL  Auto Eosinophil # : 0.0 K/uL  Auto Basophil # : 0.0 K/uL  Auto Neutrophil % : 79.1 %  Auto Lymphocyte % : 15.9 %  Auto Monocyte % : 4.7 %  Auto Eosinophil % : 0.1 %  Auto Basophil % : 0.1 %                          16.2   6.8   )-----------( 228      ( 2018 17:43 )             47.2     04-06    139  |  100  |  16.0  ----------------------------<  182<H>  3.8   |  20.0<L>  |  1.27    Ca    9.1      2018 23:57  Phos  3.0     04-06  Mg     2.2     04-06    TPro  8.7  /  Alb  4.7  /  TBili  0.4  /  DBili  x   /  AST  16  /  ALT  20  /  AlkPhos  115  04-06    LIVER FUNCTIONS - ( 2018 17:43 )  Alb: 4.7 g/dL / Pro: 8.7 g/dL / ALK PHOS: 115 U/L / ALT: 20 U/L / AST: 16 U/L / GGT: x             Urinalysis Basic - ( 2018 00:07 )    Color: Yellow / Appearance: Clear / S.025 / pH: x  Gluc: x / Ketone: Large  / Bili: Negative / Urobili: Negative mg/dL   Blood: x / Protein: 30 mg/dL / Nitrite: Negative   Leuk Esterase: Trace / RBC: Negative /HPF / WBC 3-5   Sq Epi: x / Non Sq Epi: Occasional / Bacteria: x

## 2018-04-07 NOTE — DISCHARGE NOTE ADULT - CARE PLAN
Principal Discharge DX:	Diabetes  Goal:	maintenance  Assessment and plan of treatment:	Your blood glucose was found to be elevated. We did a test that shows your sugar levels for the past 3 months (HbA1C, which was 14.8, which means that is really high). We are giving you medications to control the sugar levels and avoid complications, which could affect your kidneys, eyes feet and other organs. If you feel light headed, sweating suddenly, pale, like passing out or with intense abdominal pain, thirsty, hunger, urinate more frequently than normal, please come back to the ED. Please follow up with your primary care physician within 5 days Principal Discharge DX:	Diabetes  Goal:	maintenance  Assessment and plan of treatment:	Your blood glucose was found to be elevated. We did a test that shows your sugar levels for the past 3 months (HbA1C, which was 14.8, which means that is really high). We are giving you medications to control the sugar levels and avoid complications, which could affect your kidneys, eyes feet and other organs. If you feel light headed, sweating suddenly, pale, like passing out or with intense abdominal pain, thirsty, hunger, urinate more frequently than normal, please come back to the ED. Please follow up with your primary care physician within 5 days  Secondary Diagnosis:	Chest pain, atypical  Assessment and plan of treatment:	Given your family history of cardiac disease, it is important for you to follow up with your pMD and cardiologist after discharge. Your troponins were negative. Principal Discharge DX:	Diabetes  Goal:	maintenance  Assessment and plan of treatment:	Your blood glucose was found to be elevated. We did a test that shows your sugar levels for the past 3 months (HbA1C, which was 14.8, which means that is really high). We are giving you medications to control the sugar levels and avoid complications, which could affect your kidneys, eyes, feet and other organs. If you feel light headed, sweating suddenly, pale, feel like passing out or with intense abdominal pain, thirsty, hunger, urinate more frequently than normal, please come back to the ED. Please follow up at [facility] clinic on April 11, 2017 at 10:45 with Dr Vergara.  Secondary Diagnosis:	Chest pain, atypical  Goal:	To prevent cardiac disease  Assessment and plan of treatment:	Given your family history of cardiac disease, it is important for you to follow up with your PMD and cardiologist after discharge. Your troponins were negative.  Secondary Diagnosis:	Essential hypertension  Goal:	To maintain blood pressure within normal range  Assessment and plan of treatment:	Your blood pressure tends to be elevated unless we control it with medications and diet. We are giving you medications to maintain it in adequate levels. If it gets uncontrolled, it could cause serious diseases like a hearth attack or a stroke. Please follow up with your primary care physician and with cardiology. Also measure your blood pressure at home periodically. If it is >140/90 consistently or you have palpitations, chest pain, shortness of breath, lightheadedness, intense headaches, please come back to the ED. Also follow up the diet recommendations as above and continue to take your BP medications as prescribed. Principal Discharge DX:	Diabetes  Goal:	maintenance  Assessment and plan of treatment:	Your blood glucose was found to be elevated. We did a test that shows your sugar levels for the past 3 months (HbA1C, which was 14.8, which means that is really high). We are giving you medications to control the sugar levels and avoid complications, which could affect your kidneys, eyes, feet and other organs. If you feel light headed, sweating suddenly, pale, feel like passing out or with intense abdominal pain, thirsty, hunger, urinate more frequently than normal, please come back to the ED. Please follow up at [facility] clinic on April 11, 2017 at 10:45 with Dr Vergara.  Secondary Diagnosis:	Chest pain, atypical  Goal:	To prevent cardiac disease  Assessment and plan of treatment:	Given your family history of cardiac disease, it is important for you to follow up with your primary care physician and cardiologist after discharge. Your troponins were negative, stress test and coronary calcium cat scan was negative.  Secondary Diagnosis:	Essential hypertension  Goal:	To maintain blood pressure within normal range  Assessment and plan of treatment:	Your blood pressure tends to be elevated unless we control it with medications and diet. We are giving you medications to maintain it in adequate levels. If it gets uncontrolled, it could cause serious diseases like a hearth attack or a stroke. Please follow up with your primary care physician and with cardiology. Also measure your blood pressure at home periodically. If it is >140/90 consistently or you have palpitations, chest pain, shortness of breath, lightheadedness, intense headaches, please come back to the ED. Also follow up the diet recommendations as above and continue to take your BP medications as prescribed.

## 2018-04-07 NOTE — H&P ADULT - PROBLEM SELECTOR PLAN 2
cont IVF for gentle hydration  rate increased to 150cc/hr, monitor closely for s/s of fluid overload cont IVF for gentle hydration  rate increased to 150cc/hr, monitor closely for s/s of fluid overload  s/p 4liters fluid

## 2018-04-07 NOTE — H&P ADULT - HISTORY OF PRESENT ILLNESS
48y/o Male pmh DM2 (diagnosed 10 yr ago, tx briefly metformin, dieting & lifestyle modification last 5yrs) noncompliant with medications presents with cc vomiting.    Patient has had 2 weeks of increased urinary frequency (urinating >10 times daily), increased thirst (drinking >10 glasses of water daily), associated fatigue. He has had nausea, vomiting >3times per day.        Admit with more than 2 weeks of not feeling well classic symptoms of uncontrolled hyperglycemia polyuria/polydipsia.  Has had anorexia over the last few days with nausea and dizziness, and a few episodes of bilious emesis.      DM2 (diagnosed 10 yr ago, tx briefly metformin, dieting & lifestyle modification last 5yrs) 46y/o Male pmh DM2 (diagnosed 10 yr ago, tx briefly metformin, dieting & lifestyle modification last 5yrs) noncompliant with medications presents with cc vomiting.    He has had nausea, vomiting every other day for the last 2 weeks, 1-2 times per day, clear fluid.  Last vomit was yesterday.   vomit occurs more with eating.     Patient has had 2 weeks of increased urinary frequency (urinating >10 times daily), increased thirst (drinking >10 glasses of water daily), associated fatigue.     DM2 diagnosed 10 yr ago, treated briefly metformin, his a1c normalized with dieting & lifestyle modification, he has subsequently not followed up with a pcp in the last 5 years.

## 2018-04-07 NOTE — DISCHARGE NOTE ADULT - PATIENT PORTAL LINK FT
You can access the [facility] Patient Portal, offered by [facility], by registering with the following website: [email or web address]

## 2018-04-07 NOTE — DISCHARGE NOTE ADULT - HOSPITAL COURSE
A/P: 47M hx DM was on metformin years ago, stopped after his glycemic control was improved with diet and exercise and has had no follow up with a PMD, currently p/w more than 2 weeks of not feeling well; symptoms of uncontrolled hyperglycemia polyuria/polydipsia and most recently the last few days with nausea, vomiting (nb nb), inability to tolerate po intake and dizziness. Noted to have hyperglycemia fs~400 range, A, ketones in the urine and acetone in the serum. Pt admitted with DKA placed on IVF, insulin drip for which ICU consulted but deemed the pt not a candidate for the ICU if AG continued to improve. AG trended down wards and pt clinically with improved FS. Pt was taken off the insulin drip, bridged with lantus and humalog, thus far received lantus 15 u sq qhs last night and 7 this am and continues on HSS, FS ACHS TID. HBA1C: 14.8, given N/V uncontrolled fs starvation ketosis also contributing to current sx. Initiated diet and pt tolerated well. Will maintain on IVF for now for 12 more hours. Repeat labs to be completed at 2pm. Endocrine consulted, counseled patient extensively on continuing and being educated on injectable insulin. Patient is in agreement with the plan of care. Pt also has no insurance and recommended for him to follow at [facility] when fs better controlled for further DM management. Noted elevated bp up to sbp 130's will not initiate bp medications unless pt remains high. If so given proteinuria on UA and uncontrolled DM will start low dose ace inhibitor. Pt also with burning chest pain a few weeks ago, reports that he has intermittent chest pain associates it being worse with food seems atypical and possible GERD related; pt started on PPI troponin thus far negative. Tele and ekg shows non specific st changes mostly in the anterolateral leads; pt reports fam hx of heart dz but unclear if MI related. Given pt is an uncontrolled DM and ekg changes cardio consulted, pending eval and tte ordered.       45 min spent coordinating this discharge 47M hx DM was on metformin years ago, stopped after his glycemic control was improved with diet and exercise and has had no follow up with a PMD, currently p/w more than 2 weeks of not feeling well; symptoms of uncontrolled hyperglycemia polyuria/polydipsia and most recently the last few days with nausea, vomiting (nb nb), inability to tolerate po intake and dizziness. Noted to have hyperglycemia fs~400 range, A, ketones in the urine and acetone in the serum. Pt admitted with DKA placed on IVF, insulin drip for which ICU consulted but deemed the pt not a candidate for the ICU if AG continued to improve. AG trended down wards and pt clinically with improved FS. Pt was taken off the insulin drip, bridged with lantus and humalog, thus far received lantus 15 u sq qhs  and 7 in the am and continued on HSS, FS ACHS TID. HBA1C: 14.8, given N/V uncontrolled fs starvation ketosis also contributing to current sx. Initiated diet and pt tolerated well.     Pt also has no insurance and recommended for him to follow at [facility] when fs better controlled for further DM management.  Pt also with burning chest pain a few weeks ago, reports that he has intermittent chest pain associates it being worse with food seems atypical and possible GERD related; pt started on PPI , troponins negative.   Tele and ekg shows non specific st changes mostly in the anterolateral leads; pt reports fam hx of heart dz but unclear if MI related. Given pt is an uncontrolled DM and ekg changes cardio consulted  TTE revealed _______      45 min spent coordinating this discharge 47M hx DM was on metformin years ago, stopped after his glycemic control was improved with diet and exercise and has had no follow up with a PMD, currently p/w more than 2 weeks of not feeling well; symptoms of uncontrolled hyperglycemia polyuria/polydipsia and most recently the last few days with nausea, vomiting (nb nb), inability to tolerate po intake and dizziness. Noted to have hyperglycemia fs~400 range, A, ketones in the urine and acetone in the serum. Pt admitted with DKA placed on IVF, insulin drip for which ICU consulted but deemed the pt not a candidate for the ICU if AG continued to improve. AG trended down wards and pt clinically with improved FS. Pt was taken off the insulin drip, bridged with lantus and humalog, thus far received lantus 15 u sq qhs  and 7 in the am and continued on HSS, FS ACHS TID. HBA1C: 14.8, given N/V uncontrolled fs starvation ketosis also contributing to current sx. Initiated diet and pt tolerated well.     Pt also has no insurance and recommended for him to follow at [facility] when fs better controlled for further DM management.  Pt also with burning chest pain a few weeks ago, reports that he has intermittent chest pain associates it being worse with food seems atypical and possible GERD related; pt started on PPI , troponins negative.   Tele and ekg shows non specific st changes mostly in the anterolateral leads; pt reports fam hx of heart dz but unclear if MI related. Given pt is an uncontrolled DM and ekg changes cardio consulted  TTE revealed _______  Patient was educated on need for diabetic management and insulin use.   Patient in medically optimized condition for discharge with instructions to follow up in 1-2 days with PMD and endocrinologist.       45 min spent coordinating this discharge 47M hx DM was on metformin years ago, stopped after his glycemic control was improved with diet and exercise and has had no follow up with a PMD, presented with more than 2 weeks of not feeling well; symptoms of uncontrolled hyperglycemia polyuria/polydipsia and most recently the last few days with nausea, vomiting (nb nb), inability to tolerate po intake and dizziness. Noted to have hyperglycemia fs~400 range, A, ketones in the urine and acetone in the serum. Pt admitted with DKA placed on IVF, insulin drip for which ICU consulted but deemed the pt not a candidate for the ICU if AG continued to improve. AG trended down wards and pt improved clinically with improved FS. Pt was taken off the insulin drip, bridged with lantus and humalog, and continued on HSS, FS ACHS TID. HBA1C: 14.8, given N/V uncontrolled fs starvation ketosis also contributing to current sx. Initiated diet and pt tolerated well. Patient improved rapidly with insulin and blood glucose control. Pt also had burning chest pain a few weeks ago, reports that he has intermittent chest pain associates it being worse with food seems atypical and possible GERD related; pt started on PPI, troponins negative. Tele and ekg shows non specific st changes mostly in the anterolateral leads; pt reports fam hx of heart dz but unclear if MI related. Given pt is an uncontrolled DM and EKG changes cardio consulted. TTE revealed LVEF 60-65% with no structural abnormalities. Patient received a cardiac stress test which was negative and a cardiac CT calcium score of zero.   Patient was seen by endocrinology who recommended outpatient treatment with Novolin 70/30 considering patients insurance status. They recommended 20U before breakfast and 25U before dinner everyday. Patient has a follow up appointment at [facility] clinic on 2017 with Dr. Vergara. Patient was educated on need for diabetic management and insulin use.       Patient in medically optimized condition for discharge with instructions to follow up in 2 days at [facility] clinic and endocrinologist.   45 min spent coordinating this discharge

## 2018-04-07 NOTE — DISCHARGE NOTE ADULT - PLAN OF CARE
maintenance Your blood glucose was found to be elevated. We did a test that shows your sugar levels for the past 3 months (HbA1C, which was 14.8, which means that is really high). We are giving you medications to control the sugar levels and avoid complications, which could affect your kidneys, eyes feet and other organs. If you feel light headed, sweating suddenly, pale, like passing out or with intense abdominal pain, thirsty, hunger, urinate more frequently than normal, please come back to the ED. Please follow up with your primary care physician within 5 days Given your family history of cardiac disease, it is important for you to follow up with your pMD and cardiologist after discharge. Your troponins were negative. Your blood glucose was found to be elevated. We did a test that shows your sugar levels for the past 3 months (HbA1C, which was 14.8, which means that is really high). We are giving you medications to control the sugar levels and avoid complications, which could affect your kidneys, eyes, feet and other organs. If you feel light headed, sweating suddenly, pale, feel like passing out or with intense abdominal pain, thirsty, hunger, urinate more frequently than normal, please come back to the ED. Please follow up at [facility] clinic on April 11, 2017 at 10:45 with Dr Vergara. To prevent cardiac disease Given your family history of cardiac disease, it is important for you to follow up with your PMD and cardiologist after discharge. Your troponins were negative. To maintain blood pressure within normal range Your blood pressure tends to be elevated unless we control it with medications and diet. We are giving you medications to maintain it in adequate levels. If it gets uncontrolled, it could cause serious diseases like a hearth attack or a stroke. Please follow up with your primary care physician and with cardiology. Also measure your blood pressure at home periodically. If it is >140/90 consistently or you have palpitations, chest pain, shortness of breath, lightheadedness, intense headaches, please come back to the ED. Also follow up the diet recommendations as above and continue to take your BP medications as prescribed. Given your family history of cardiac disease, it is important for you to follow up with your primary care physician and cardiologist after discharge. Your troponins were negative, stress test and coronary calcium cat scan was negative.

## 2018-04-07 NOTE — DISCHARGE NOTE ADULT - CARE PROVIDER_API CALL
[facility], [place] [address]  Phone: [phone]  Fax: [phone]  Phone: (   )    -  Fax: (   )    -    Jared Hendrickson), EndocrinologyMetabDiabetes; Internal Medicine  [address] [place] [address]  Phone: [phone]  Fax: [phone]

## 2018-04-07 NOTE — H&P ADULT - ASSESSMENT
48 yo male with a pmh/o DMII previously on metformin, however now diet controlled, who presents with 2 weeks of nausea and vomiting, admitted with starvation ketosis. 46 yo male with a pmh/o DMII (A1c=14.8) previously on metformin, however now diet controlled, who presents with 2 weeks of nausea and vomiting, admitted with starvation ketosis.

## 2018-04-07 NOTE — DISCHARGE NOTE ADULT - OTHER SIGNIFICANT FINDINGS
13.7   4.6   )-----------( 174      ( 07 Apr 2018 06:50 )             40.4     04-07    139  |  100  |  12.0  ----------------------------<  191<H>  4.2   |  21.0<L>  |  1.04    Ca    8.8      07 Apr 2018 09:18  Phos  2.7     04-07  Mg     1.9     04-07    TPro  8.7  /  Alb  4.7  /  TBili  0.4  /  DBili  x   /  AST  16  /  ALT  20  /  AlkPhos  115  04-06    Hemoglobin A1C, Whole Blood (04.07.18 @ 00:01)    Hemoglobin A1C, Whole Blood: 14.8 % 13.7   4.6   )-----------( 174      ( 07 Apr 2018 06:50 )             40.4     04-07    139  |  100  |  12.0  ----------------------------<  191<H>  4.2   |  21.0<L>  |  1.04    Ca    8.8      07 Apr 2018 09:18  Phos  2.7     04-07  Mg     1.9     04-07    TPro  8.7  /  Alb  4.7  /  TBili  0.4  /  DBili  x   /  AST  16  /  ALT  20  /  AlkPhos  115  04-06    Hemoglobin A1C, Whole Blood (04.07.18 @ 00:01)    Hemoglobin A1C, Whole Blood: 14.8 %    Imaging  < from: TTE Echo Complete w/Doppler (04.07.18 @ 15:53) >  Summary:   1. The left atrium is normal in size.   2. Normal wall motion. Left ventricular ejection fraction, by visual   estimation, is 60 to 65%.   3. The right atrium is normal in size.   4. Normal right ventricular size and function.   5. No sigificant valvular abnormality.   6. There is no evidence of pericardial effusion.

## 2018-04-07 NOTE — H&P ADULT - PROBLEM SELECTOR PLAN 3
Pt s/p lantus 15 units and insulin drip  humalog 4 now for BS >180  HISS  accucheck q 2 hrs until anion gap closed  continue IVF for gentle hydration  BMP q 4 hrs  Mg & Phos q 4 hrs  f/u HbA1c Pt s/p lantus 15 units and insulin drip  humalog 4 now for BS >180  HISS  accucheck q 2 hrs until anion gap closed  continue IVF for gentle hydration  cbc, BMP q 4 hrs  Mg & Phos q 4 hrs  f/u HbA1c A1c=14.8  Pt s/p lantus 15 units and insulin drip  humalog 4 x2  Lantus 5   (5am)    Lantus 2   (6:20am)  HISS  accucheck q 2 hrs until anion gap closed  continue IVF for gentle hydration  cbc, BMP q 4 hrs  Mg & Phos q 4 hrs  F/s:  408-->363--> 191--> 184--> 200--> 189  A--> 19-->  20  pending repeat bmp

## 2018-04-07 NOTE — H&P ADULT - PROBLEM SELECTOR PLAN 4
likely due to acute metabolic process-now resolved  EKG reviewed-NSR, no STT inversion/elevation  serial troponin, first set negative

## 2018-04-07 NOTE — DISCHARGE NOTE ADULT - NS AS ACTIVITY OBS
Walking-Outdoors allowed/Stairs allowed/No Heavy lifting/straining/Sex allowed/Return to Work/School allowed/Bathing allowed/as tolerated/Showering allowed/Driving allowed/Walking-Indoors allowed

## 2018-04-07 NOTE — H&P ADULT - PROBLEM SELECTOR PLAN 1
admit to monitored bed  EKG reviewed  cbc, cmp, mg, phos, coags for AM  serial labs q 4 and POC q 2 due to anion gap elevation  cont IVF hydration  consider PT eval when pt status improved  fall precautions  VCD boots for DVT prophylaxis  chest x ray and u/a negative for infectious etiology  zofran prn n/v  tylenol prn fever/pain admit to monitored bed, CONSISTENT Carb diet,   EKG reviewed  cbc, cmp, mg, phos, coags for AM  serial labs q 4 and POC q 2 due to anion gap elevation  cont IVF hydration  consider PT eval when pt status improved  fall precautions  VCD boots for DVT prophylaxis  chest x ray and u/a negative for infectious etiology  zofran prn n/v  tylenol prn fever/pain

## 2018-04-08 DIAGNOSIS — Z29.9 ENCOUNTER FOR PROPHYLACTIC MEASURES, UNSPECIFIED: ICD-10-CM

## 2018-04-08 DIAGNOSIS — I10 ESSENTIAL (PRIMARY) HYPERTENSION: ICD-10-CM

## 2018-04-08 DIAGNOSIS — E11.9 TYPE 2 DIABETES MELLITUS WITHOUT COMPLICATIONS: ICD-10-CM

## 2018-04-08 DIAGNOSIS — E13.10 OTHER SPECIFIED DIABETES MELLITUS WITH KETOACIDOSIS WITHOUT COMA: ICD-10-CM

## 2018-04-08 LAB
ANION GAP SERPL CALC-SCNC: 13 MMOL/L — SIGNIFICANT CHANGE UP (ref 5–17)
BUN SERPL-MCNC: 9 MG/DL — SIGNIFICANT CHANGE UP (ref 8–20)
CALCIUM SERPL-MCNC: 8.3 MG/DL — LOW (ref 8.6–10.2)
CHLORIDE SERPL-SCNC: 100 MMOL/L — SIGNIFICANT CHANGE UP (ref 98–107)
CO2 SERPL-SCNC: 22 MMOL/L — SIGNIFICANT CHANGE UP (ref 22–29)
CREAT SERPL-MCNC: 0.89 MG/DL — SIGNIFICANT CHANGE UP (ref 0.5–1.3)
GLUCOSE BLDC GLUCOMTR-MCNC: 153 MG/DL — HIGH (ref 70–99)
GLUCOSE BLDC GLUCOMTR-MCNC: 218 MG/DL — HIGH (ref 70–99)
GLUCOSE BLDC GLUCOMTR-MCNC: 234 MG/DL — HIGH (ref 70–99)
GLUCOSE BLDC GLUCOMTR-MCNC: 276 MG/DL — HIGH (ref 70–99)
GLUCOSE BLDC GLUCOMTR-MCNC: 354 MG/DL — HIGH (ref 70–99)
GLUCOSE SERPL-MCNC: 316 MG/DL — HIGH (ref 70–115)
HCT VFR BLD CALC: 35.5 % — LOW (ref 42–52)
HGB BLD-MCNC: 11.8 G/DL — LOW (ref 14–18)
MCHC RBC-ENTMCNC: 29.1 PG — SIGNIFICANT CHANGE UP (ref 27–31)
MCHC RBC-ENTMCNC: 33.2 G/DL — SIGNIFICANT CHANGE UP (ref 32–36)
MCV RBC AUTO: 87.7 FL — SIGNIFICANT CHANGE UP (ref 80–94)
PLATELET # BLD AUTO: 154 K/UL — SIGNIFICANT CHANGE UP (ref 150–400)
POTASSIUM SERPL-MCNC: 3.8 MMOL/L — SIGNIFICANT CHANGE UP (ref 3.5–5.3)
POTASSIUM SERPL-SCNC: 3.8 MMOL/L — SIGNIFICANT CHANGE UP (ref 3.5–5.3)
RBC # BLD: 4.05 M/UL — LOW (ref 4.6–6.2)
RBC # FLD: 12.6 % — SIGNIFICANT CHANGE UP (ref 11–15.6)
SODIUM SERPL-SCNC: 135 MMOL/L — SIGNIFICANT CHANGE UP (ref 135–145)
WBC # BLD: 3.1 K/UL — LOW (ref 4.8–10.8)
WBC # FLD AUTO: 3.1 K/UL — LOW (ref 4.8–10.8)

## 2018-04-08 PROCEDURE — 99233 SBSQ HOSP IP/OBS HIGH 50: CPT | Mod: GC

## 2018-04-08 PROCEDURE — 99233 SBSQ HOSP IP/OBS HIGH 50: CPT

## 2018-04-08 RX ORDER — MAGNESIUM SULFATE 500 MG/ML
2 VIAL (ML) INJECTION ONCE
Qty: 0 | Refills: 0 | Status: COMPLETED | OUTPATIENT
Start: 2018-04-08 | End: 2018-04-08

## 2018-04-08 RX ORDER — LISINOPRIL 2.5 MG/1
1 TABLET ORAL
Qty: 0 | Refills: 0 | COMMUNITY
Start: 2018-04-08

## 2018-04-08 RX ORDER — LISINOPRIL 2.5 MG/1
2.5 TABLET ORAL DAILY
Qty: 0 | Refills: 0 | Status: DISCONTINUED | OUTPATIENT
Start: 2018-04-08 | End: 2018-04-09

## 2018-04-08 RX ORDER — ASPIRIN/CALCIUM CARB/MAGNESIUM 324 MG
81 TABLET ORAL DAILY
Qty: 0 | Refills: 0 | Status: DISCONTINUED | OUTPATIENT
Start: 2018-04-08 | End: 2018-04-09

## 2018-04-08 RX ORDER — INSULIN GLARGINE 100 [IU]/ML
30 INJECTION, SOLUTION SUBCUTANEOUS AT BEDTIME
Qty: 0 | Refills: 0 | Status: DISCONTINUED | OUTPATIENT
Start: 2018-04-08 | End: 2018-04-09

## 2018-04-08 RX ADMIN — Medication 2: at 11:49

## 2018-04-08 RX ADMIN — Medication 6: at 17:09

## 2018-04-08 RX ADMIN — Medication 50 GRAM(S): at 01:57

## 2018-04-08 RX ADMIN — Medication 8 UNIT(S): at 17:09

## 2018-04-08 RX ADMIN — LISINOPRIL 2.5 MILLIGRAM(S): 2.5 TABLET ORAL at 17:10

## 2018-04-08 RX ADMIN — Medication 85 MILLIMOLE(S): at 01:58

## 2018-04-08 RX ADMIN — ENOXAPARIN SODIUM 40 MILLIGRAM(S): 100 INJECTION SUBCUTANEOUS at 11:45

## 2018-04-08 RX ADMIN — Medication 8 UNIT(S): at 11:49

## 2018-04-08 RX ADMIN — Medication 10: at 08:20

## 2018-04-08 RX ADMIN — Medication 81 MILLIGRAM(S): at 17:10

## 2018-04-08 RX ADMIN — INSULIN GLARGINE 30 UNIT(S): 100 INJECTION, SOLUTION SUBCUTANEOUS at 22:04

## 2018-04-08 RX ADMIN — Medication 8 UNIT(S): at 08:20

## 2018-04-08 NOTE — PROGRESS NOTE ADULT - PROBLEM SELECTOR PLAN 1
Resolved. Gap closed  Sugars 354 this am  8 premeals, 24 lantus  Will adjust regimen asneeded  diabetic education  insulin education  diabetic diet  moderate HSS premeals and low dose HSS QHS Resolved  -fs remain stable     HBA1C: 14   -Dehydration resolved   -d/c IVF   -AG closed   -morning fs elevated but premeal stable   -will c/w lantus 24 u sq qhs   -c/w humalog 8 u sq ACHS tid   -c/w HSS ACHS TID and QHS   -c/w hypoglycemia protocol   -pt is self injecting  -Diabetic  consult noted and appreciated  -endocrinology consult noted and appreciated   -pt counseled on medications diet compliance

## 2018-04-08 NOTE — PROGRESS NOTE ADULT - PROBLEM SELECTOR PLAN 2
c/w plasmalyte @ 125cc/hr -pt with intermittent chest pain concern for CAD vs gerd  -cardiac enzymes negative  -ekg with non specific st changes  -tte with preserved ef no other noted abnormalties  -f/u stress test in the am  -npo at midnight  -c/w asa 81 mg po qd   -cardio f/u noted and appreciated

## 2018-04-08 NOTE — PROGRESS NOTE ADULT - ASSESSMENT
A/P: 47M hx DM was on metformin years ago, stopped after his glycemic control was improved with diet and exercise and has had no follow up with a PMD, currently p/w more than 2 weeks of not feeling well; symptoms of uncontrolled hyperglycemia polyuria/polydipsia and most recently the last few days with nausea, vomiting (nb nb), inability to tolerate po intake and dizziness. Noted to have hyperglycemia fs~400 range, A, ketones in the urine and acetone in the serum. Pt admitted with DKA placed on IVF, insulin drip for which ICU consulted but deemed the pt not a candidate for the ICU if AG continued to improve. AG trended down wards and pt clinically with improved FS. Pt was taken off the insulin drip, bridged with lantus and humalog, thus far received lantus 15 u sq qhs last night and 7 this am and continues on HSS, FS ACHS TID. HBA1C: 14.8, given N/V uncontrolled fs starvation ketosis also contributing to current sx. Initiated diet and pt tolerated well. Will maintain on IVF for now for 12 more hours. Repeat labs to be completed at 2pm. Endocrine consulted, counseled patient extensively on continuing and being educated on injectable insulin. Patient is in agreement with the plan of care. Pt also has no insurance and recommended for him to follow at [facility] when fs better controlled for further DM management. Noted elevated bp up to sbp 130's will not initiate bp medications unless pt remains high. If so given proteinuria on UA and uncontrolled DM will start low dose ace inhibitor. Pt also with burning chest pain a few weeks ago, reports that he has intermittent chest pain associates it being worse with food seems atypical and possible GERD related; pt started on PPI troponin thus far negative. Tele and ekg shows non specific st changes mostly in the anterolateral leads; pt reports fam hx of heart dz but unclear if MI related. Given pt is an uncontrolled DM and ekg 47M hx DM was on metformin years ago, stopped after his glycemic control was improved with diet and exercise and has had no follow up with a PMD, currently p/w more than 2 weeks of not feeling well; symptoms of uncontrolled hyperglycemia polyuria/polydipsia and most recently the last few days with nausea, vomiting (nb nb), inability to tolerate po intake and dizziness. Noted to have hyperglycemia fs~400 range, A, ketones in the urine and acetone in the serum. Pt admitted with DKA placed on IVF, insulin drip for which ICU consulted but deemed the pt not a candidate for the ICU if AG continued to improve. AG trended down wards and pt clinically with improved FS. Pt started on lantus/ HSS.  DKA resolved, endocrinology consulted. Patient also with atypical chest pain, non specific ekg changes, negative cardiac enzymes and normal tte, cardiology consulted and pt for stress test in the am.

## 2018-04-08 NOTE — PROGRESS NOTE ADULT - SUBJECTIVE AND OBJECTIVE BOX
Patient is a 47y old  Male who presents with a chief complaint of vomiting, weakness (2018 18:04)      INTERVAL HPI/OVERNIGHT EVENTS:  47y  Male seen and examined. No events overnight. Voided 2 times overnight, denies increased thirst, f./c, n/v. Says he understands he will need to be on insulin now and was educated on insulin administration yesterday. Pt is appreciative of our thoroughness and care and hopes to go home soon.  Says he was not on metformin previously due to being told that it would put him on dialysis and was only controlling his diabetes with diet for the past 10 years since being diagnosed.       Allergies    No Known Allergies    Intolerances        Vital Signs Last 24 Hrs  T(C): 36.7 (2018 04:20), Max: 37.4 (2018 22:05)  T(F): 98 (2018 04:20), Max: 99.4 (2018 22:05)  HR: 72 (2018 04:20) (72 - 85)  BP: 126/70 (2018 04:20) (116/69 - 148/80)  BP(mean): --  RR: 19 (2018 04:20) (15 - 19)  SpO2: 96% (2018 04:20) (96% - 98%)      Daily     Daily   I&O's Detail    2018 07:01  -  2018 07:00  --------------------------------------------------------  IN:    lactated ringers.: 300 mL  Total IN: 300 mL    OUT:    Voided: 850 mL  Total OUT: 850 mL    Total NET: -550 mL                PHYSICAL EXAM:    PHYSICAL EXAM:      Constitutional: NAD, well-groomed, well-developed  HEENT: PERRLA, EOMI, Normal Hearing  Neck: No JVD, supple  Back: Normal spine flexure, No CVA tenderness  Respiratory: CTABL no w/r/r  Cardiovascular: S1 and S2, RRR, no m/r/g  Gastrointestinal: BS+ normoactive, soft, ND, NTTP ; obese  Extremities: No c/c/e  Vascular: 2+ peripheral pulses  Neurological: AAO x 3, no focal deficits  Psychiatric: Normal mood, normal affect  Musculoskeletal: 5/5 strength b/l upper and lower extremities  Skin: No rashes        LABS:                        11.8   3.1   )-----------( 154      ( 2018 06:20 )             35.5     CBC Full  -  ( 2018 06:20 )  WBC Count : 3.1 K/uL  Hemoglobin : 11.8 g/dL  Hematocrit : 35.5 %  Platelet Count - Automated : 154 K/uL  Mean Cell Volume : 87.7 fl  Mean Cell Hemoglobin : 29.1 pg  Mean Cell Hemoglobin Concentration : 33.2 g/dL  Auto Neutrophil # : x  Auto Lymphocyte # : x  Auto Monocyte # : x  Auto Eosinophil # : x  Auto Basophil # : x  Auto Neutrophil % : x  Auto Lymphocyte % : x  Auto Monocyte % : x  Auto Eosinophil % : x  Auto Basophil % : x        135  |  100  |  9.0  ----------------------------<  316<H>  3.8   |  22.0  |  0.89    Ca    8.3<L>      2018 06:20  Phos  2.0     -  Mg     1.6         TPro  8.7  /  Alb  4.7  /  TBili  0.4  /  DBili  x   /  AST  16  /  ALT  20  /  AlkPhos  115  04-06    PT/INR - ( 2018 06:50 )   PT: 11.5 sec;   INR: 1.04 ratio         PTT - ( 2018 06:50 )  PTT:26.3 sec  Urinalysis Basic - ( 2018 00:07 )    Color: Yellow / Appearance: Clear / S.025 / pH: x  Gluc: x / Ketone: Large  / Bili: Negative / Urobili: Negative mg/dL   Blood: x / Protein: 30 mg/dL / Nitrite: Negative   Leuk Esterase: Trace / RBC: Negative /HPF / WBC 3-5   Sq Epi: x / Non Sq Epi: Occasional / Bacteria: x      Hemoglobin A1C, Whole Blood: 14.8 % ( @ 00:01)            LIVER FUNCTIONS - ( 2018 17:43 )  Alb: 4.7 g/dL / Pro: 8.7 g/dL / ALK PHOS: 115 U/L / ALT: 20 U/L / AST: 16 U/L / GGT: x             RADIOLOGY & ADDITIONAL TESTS:

## 2018-04-08 NOTE — CONSULT NOTE ADULT - ATTENDING COMMENTS
atypical chest pain few days ago. Admitted with DKA. not taking any meds coz he was feeling fine. id not see any physician.   .heart calcium score and stress echo.

## 2018-04-08 NOTE — CONSULT NOTE ADULT - SUBJECTIVE AND OBJECTIVE BOX
History obtained by: Patient and medical record     obtained: No    Chief complaint:  "My chest hurt last week"    HPI:  46y/o male with hx of DM2, briefly on Metformin years ago but didn't follow up with PMD in years, presented with polyuria, polydipsia, N/V, found to have DKA. Pt was hydrated with IV fluids, initially placed on insulin gtt, now AG closed and pt transitioned to Lantus and Humalog.   Pt endorses burning, intermittent chest pain for 3-4 weeks, last felt about a week ago, non-radiating, not related to any activity or eating, associated with some SOB. Troponin negative x3, EKG shows SR with non-specific ST changes in anterior leads. TTE unremarkable, normal EF. Pt is a current daily cigarette/occasional marihuana smoker and has a family hx of CAD (father).        REVIEW OF SYMPTOMS:   Cardiovascular:  c/o chest pain and dyspnea,  denies syncope, palpitations,  dizziness, orthopnea, paroxsymal nocturnal dyspnea;    Respiratory: c/o dyspnea, denies cough,      Genitourinary:  No dysuria, no hematuria;   Gastrointestinal:   No dark color stool, no melena, no diarrhea, no constipation, c/o abdominal pain and vomiting   Neurological: No headache, no dizziness, no slurred speech;    Psychiatric: No agitation, no anxiety.  ALL OTHER REVIEW OF SYSTEMS ARE NEGATIVE.    MEDICATIONS  (STANDING):  dextrose 5%. 1000 milliLiter(s) (50 mL/Hr) IV Continuous <Continuous>  dextrose 50% Injectable 12.5 Gram(s) IV Push once  dextrose 50% Injectable 25 Gram(s) IV Push once  dextrose 50% Injectable 25 Gram(s) IV Push once  enoxaparin Injectable 40 milliGRAM(s) SubCutaneous daily  insulin glargine Injectable (LANTUS) 24 Unit(s) SubCutaneous at bedtime  insulin lispro (HumaLOG) corrective regimen sliding scale   SubCutaneous at bedtime  insulin lispro (HumaLOG) corrective regimen sliding scale   SubCutaneous three times a day before meals  insulin lispro Injectable (HumaLOG) 8 Unit(s) SubCutaneous three times a day with meals  multiple electrolytes Injection Type 1 1000 milliLiter(s) (125 mL/Hr) IV Continuous <Continuous>  pantoprazole    Tablet 40 milliGRAM(s) Oral before breakfast    MEDICATIONS  (PRN):  acetaminophen   Tablet 650 milliGRAM(s) Oral every 6 hours PRN For Temp greater than 38 C (100.4 F)  acetaminophen   Tablet. 650 milliGRAM(s) Oral every 6 hours PRN Moderate Pain (4 - 6)  dextrose Gel 1 Dose(s) Oral once PRN Blood Glucose LESS THAN 70 milliGRAM(s)/deciliter  glucagon  Injectable 1 milliGRAM(s) IntraMuscular once PRN Glucose LESS THAN 70 milligrams/deciliter  ondansetron Injectable 4 milliGRAM(s) IV Push every 4 hours PRN Nausea and/or Vomiting        PAST MEDICAL & SURGICAL HISTORY:  Blurry vision  Diabetes: diet controlled  No significant past surgical history      FAMILY HISTORY:  Family history of diabetes mellitus (Mother): mother  Family history of ischemic heart disease      SOCIAL HISTORY: works as , lives with sister    CIGARETTES:  daily smoker 1 pack/week     ALCOHOL: occasional    DRUGS: marihuana once a month    Vital Signs Last 24 Hrs  T(C): 37.4 (2018 22:05), Max: 37.4 (2018 22:05)  T(F): 99.4 (2018 22:05), Max: 99.4 (2018 22:05)  HR: 85 (2018 22:05) (69 - 85)  BP: 133/814 (2018 22:05) (116/69 - 148/80)  BP(mean): --  RR: 18 (2018 22:05) (15 - 18)  SpO2: 98% (2018 22:05) (96% - 98%)    PHYSICAL EXAM:  General: WN/WD NAD  Neurology: A&Ox3, nonfocal, GARCIA x 4  Eyes: PERRL/ EOMI, Gross vision intact  ENT/Neck: Neck supple, trachea midline, No JVD, Gross hearing intact  Respiratory: CTA B/L, No wheezing, rales, rhonchi  CV: RRR, S1S2, no murmurs  Abdominal: Soft, NT, ND +BS,   Extremities: No edema, + peripheral pulses  Skin: No Rashes, Hematoma, Ecchymosis      INTERPRETATION OF TELEMETRY: SR 70's    ECG: SR 67 bpm, nonspecific ST changes in anterior leads    I&O's Detail    2018 07:01  -  2018 03:37  --------------------------------------------------------  IN:    lactated ringers.: 300 mL  Total IN: 300 mL    OUT:    Voided: 850 mL  Total OUT: 850 mL    Total NET: -550 mL          LABS:                        12.6   4.6   )-----------( 162      ( 2018 20:05 )             36.9     04-    136  |  100  |  15.0  ----------------------------<  326<H>  4.2   |  20.0<L>  |  1.03    Ca    8.4<L>      2018 20:06  Phos  2.0     -  Mg     1.6         TPro  8.7  /  Alb  4.7  /  TBili  0.4  /  DBili  x   /  AST  16  /  ALT  20  /  AlkPhos  115  04-06    CARDIAC MARKERS ( 2018 20:06 )  x     / <0.01 ng/mL / 153 U/L / x     / 1.4 ng/mL  CARDIAC MARKERS ( 2018 06:52 )  x     / <0.01 ng/mL / x     / x     / x      CARDIAC MARKERS ( 2018 17:43 )  x     / <0.01 ng/mL / 215 U/L / x     / 1.9 ng/mL      PT/INR - ( 2018 06:50 )   PT: 11.5 sec;   INR: 1.04 ratio         PTT - ( 2018 06:50 )  PTT:26.3 sec  Urinalysis Basic - ( 2018 00:07 )    Color: Yellow / Appearance: Clear / S.025 / pH: x  Gluc: x / Ketone: Large  / Bili: Negative / Urobili: Negative mg/dL   Blood: x / Protein: 30 mg/dL / Nitrite: Negative   Leuk Esterase: Trace / RBC: Negative /HPF / WBC 3-5   Sq Epi: x / Non Sq Epi: Occasional / Bacteria: x      I&O's Summary    2018 07:01  -  2018 03:37  --------------------------------------------------------  IN: 300 mL / OUT: 850 mL / NET: -550 mL        RADIOLOGY & ADDITIONAL STUDIES:  X-ray:    PREVIOUS DIAGNOSTIC TESTING:      ECHO:  < from: TTE Echo Complete w/Doppler (18 @ 15:53) >  Summary:   1. The left atrium is normal in size.   2. Normal wall motion. Left ventricular ejection fraction, by visual   estimation, is 60 to 65%.   3. The right atrium is normal in size.   4. Normal right ventricular size and function.   5. No sigificant valvular abnormality.   6. There is no evidence of pericardial effusion.    [id number] Jeimy Mccloud MD, RPVI, Electronically signed on 2018 at   5:29:34 PM       *** Final ***       JEIMY MCCLOUD M.D., ATTENDING CARDIOLOGY  This document has been electronically signed. 2018  3:53PM    < end of copied text >        STRESS: n/a       CATHETERIZATION: n/a
HPI:  Patient is a 47 year old male with a history of type 2 DM who presented to ED with 2 weeks of polyuria, polydipsia, blurry vision and N/V.  Labs showed BG >400 with mild anion gap acidosis with evidence of ketones.  Patient stated that drinking water did not improve his symptoms and when his N/V became progressively worse he presented to ED.  In ER he was treated with fluids and insulin and his condition rapidly improved.  He is now admitted to the medical floor.    He was diagnosed with DM about 10 years ago and was initially treated with metformin, but then became non-compliant with follow up and has not been seeking medical care lately.  He is currently not taking any medications and has not been following a controlled carbohydrate diet.  He reports a mild weight loss lately as well as diffuce body aches.      PAST MEDICAL & SURGICAL HISTORY:  Blurry vision  Diabetes: diet controlled  No significant past surgical history    Allergies  No Known Allergies    MEDICATIONS  (STANDING):  dextrose 5%. 1000 milliLiter(s) (50 mL/Hr) IV Continuous <Continuous>  dextrose 50% Injectable 12.5 Gram(s) IV Push once  dextrose 50% Injectable 25 Gram(s) IV Push once  dextrose 50% Injectable 25 Gram(s) IV Push once  enoxaparin Injectable 40 milliGRAM(s) SubCutaneous daily  insulin glargine Injectable (LANTUS) 10 Unit(s) SubCutaneous at bedtime  insulin lispro (HumaLOG) corrective regimen sliding scale   SubCutaneous three times a day before meals  insulin lispro (HumaLOG) corrective regimen sliding scale   SubCutaneous at bedtime  multiple electrolytes Injection Type 1 1000 milliLiter(s) (125 mL/Hr) IV Continuous <Continuous>  pantoprazole    Tablet 40 milliGRAM(s) Oral before breakfast    SH:  works as a , no smoking    FH:  mother has DM    ROS:  as per HPI, otherwise 10 point ROS is negative.    Vital Signs Last 24 Hrs  T(C): 37.1 (07 Apr 2018 17:10), Max: 37.3 (07 Apr 2018 16:28)  T(F): 98.7 (07 Apr 2018 17:10), Max: 99.1 (07 Apr 2018 16:28)  HR: 78 (07 Apr 2018 17:10) (59 - 83)  BP: 148/80 (07 Apr 2018 17:10) (116/69 - 148/80)  RR: 18 (07 Apr 2018 17:10) (15 - 18)  SpO2: 98% (07 Apr 2018 17:10) (96% - 100%)    PE:  Gen: AAO, NAD  HEENT:  sclera anicteric, EOMI,  MMM  Neck:  no thyromegaly, no LAD  CV:  nl S1 + S2, RRR, no m/r/g  Resp:  nl respiratory effort, CTA b/l  GI/ Abd: soft, NT/ ND, BS +  Neuro:  No tremor, sensation intact to light touch on b/l feet  MS:  no c/c/e, nl muscle tone  Skin:  no foot ulcers, no acanthosis nigracans, callous on feet, onychomycosis on toe nails.    LABS:  04-07    139  |  100  |  12.0  ----------------------------<  191<H>  4.2   |  21.0<L>  |  1.04    Ca    8.8      07 Apr 2018 09:18  Phos  2.7     04-07  Mg     1.9     04-07    TPro  8.7  /  Alb  4.7  /  TBili  0.4  /  DBili  x   /  AST  16  /  ALT  20  /  AlkPhos  115  04-06                          13.7   4.6   )-----------( 174      ( 07 Apr 2018 06:50 )             40.4     Hemoglobin A1C, Whole Blood: 14.8 % (04.07.18 @ 00:01)    CAPILLARY BLOOD GLUCOSE  POCT Blood Glucose.: 312 mg/dL (07 Apr 2018 16:32)  POCT Blood Glucose.: 245 mg/dL (07 Apr 2018 11:19)  POCT Blood Glucose.: 189 mg/dL (07 Apr 2018 09:26)  POCT Blood Glucose.: 175 mg/dL (07 Apr 2018 07:37)  POCT Blood Glucose.: 189 mg/dL (07 Apr 2018 05:18)  POCT Blood Glucose.: 200 mg/dL (07 Apr 2018 03:20)  POCT Blood Glucose.: 184 mg/dL (07 Apr 2018 01:16)  POCT Blood Glucose.: 191 mg/dL (06 Apr 2018 22:56)  POCT Blood Glucose.: 363 mg/dL (06 Apr 2018 19:33)
Patient is a 47y old  Male who presents with a chief complaint of   PAST MEDICAL & SURGICAL HISTORY:  Diabetes: diet controlled  No significant past surgical history    YUMIKO GRIFFITH   47y    Male    Review of Systems:   See below                       All other ROS are negative.    ICU Vital Signs Last 24 Hrs  T(C): 36.7 (06 Apr 2018 19:05), Max: 36.7 (06 Apr 2018 16:30)  T(F): 98 (06 Apr 2018 19:05), Max: 98.1 (06 Apr 2018 16:30)  HR: 83 (06 Apr 2018 19:05) (83 - 94)  BP: 124/68 (06 Apr 2018 19:05) (114/79 - 124/68)  BP(mean): --  ABP: --  ABP(mean): --  RR: 18 (06 Apr 2018 19:05) (18 - 18)  SpO2: 100% (06 Apr 2018 19:05) (95% - 100%)    Physical Examination:    General:   NAD watching TV.      HEENT:  no JVD     PULM:  bilateral BS     CVS: s1 s2 reg    ABD: soft NT    EXT: no edema     SKIN:  warm no rash no breakdown     Neuro: alert non focal      LABS:                        16.2   6.8   )-----------( 228      ( 06 Apr 2018 17:43 )             47.2     04-06    137  |  90<L>  |  21.0<H>  ----------------------------<  444<H>  5.1   |  21.0<L>  |  1.48<H>    Ca    10.4<H>      06 Apr 2018 17:43    TPro  8.7  /  Alb  4.7  /  TBili  0.4  /  DBili  x   /  AST  16  /  ALT  20  /  AlkPhos  115  04-06      CARDIAC MARKERS ( 06 Apr 2018 17:43 )  x     / <0.01 ng/mL / 215 U/L / x     / 1.9 ng/mL      CAPILLARY BLOOD GLUCOSE      POCT Blood Glucose.: 363 mg/dL (06 Apr 2018 19:33)      Medications:      insulin Infusion 5 Unit(s)/Hr IV Continuous <Continuous>        RADIOLOGY/IMAGING/ECHO    CXR without infiltrate    Critical care point of care ultrasound:    Assessment/Plan:    47M hx DM was on metformin yrs ago, stopped after his glycemic control was improved with diet and exercise  Has no MD.  Glucose was normal here in December of 2016.    Admit with more than 2 weeks of not feeling well classic symptoms of uncontrolled hyperglycemia polyuria/polydipsia.  Has had anorexia over the last few days with nausea and dizziness, and a few episodes of bilious emesis.       Found to be in mild DKA.    AG 26.        No signs or symptoms of infection.  He is in no distress, not tachy, nor tachypneic.        RX aggressive hydration.  Dose LA insulin now.  Insulin infusion for a few hrs.    Repeat labs at midnight.  (Ordered by me) Gap should be closed and if so transition to sliding scale coverage.    If gap not coming down or if getting worse, will admit to ICU for ongoing hydration and insulin infusion.        Case d/w Dr Voss E-ICU, and Dr Cobb in the ED.  Case also endorsed to Dr Boyd who will assume care if above plan works out, and if not, they will call me back for re-eval/admission

## 2018-04-08 NOTE — ADVANCED PRACTICE NURSE CONSULT - RECOMMEDATIONS
continue diabetes self management educaiton  pt to draw and inject all inuslin doses while in the hospital using insulin syringe and rn supervision    consider 70/30 humalin, due to lack of insurance

## 2018-04-08 NOTE — PROGRESS NOTE ADULT - PROBLEM SELECTOR PROBLEM 3
Type 2 diabetes mellitus with hyperglycemia, without long-term current use of insulin Essential hypertension

## 2018-04-09 VITALS
SYSTOLIC BLOOD PRESSURE: 106 MMHG | DIASTOLIC BLOOD PRESSURE: 65 MMHG | RESPIRATION RATE: 18 BRPM | HEART RATE: 79 BPM | TEMPERATURE: 97 F

## 2018-04-09 LAB
GLUCOSE BLDC GLUCOMTR-MCNC: 259 MG/DL — HIGH (ref 70–99)
GLUCOSE BLDC GLUCOMTR-MCNC: 294 MG/DL — HIGH (ref 70–99)

## 2018-04-09 PROCEDURE — 96374 THER/PROPH/DIAG INJ IV PUSH: CPT

## 2018-04-09 PROCEDURE — 82009 KETONE BODYS QUAL: CPT

## 2018-04-09 PROCEDURE — 80048 BASIC METABOLIC PNL TOTAL CA: CPT

## 2018-04-09 PROCEDURE — 82553 CREATINE MB FRACTION: CPT

## 2018-04-09 PROCEDURE — 36415 COLL VENOUS BLD VENIPUNCTURE: CPT

## 2018-04-09 PROCEDURE — 84100 ASSAY OF PHOSPHORUS: CPT

## 2018-04-09 PROCEDURE — 99285 EMERGENCY DEPT VISIT HI MDM: CPT | Mod: 25

## 2018-04-09 PROCEDURE — 99239 HOSP IP/OBS DSCHRG MGMT >30: CPT

## 2018-04-09 PROCEDURE — 93306 TTE W/DOPPLER COMPLETE: CPT

## 2018-04-09 PROCEDURE — 99233 SBSQ HOSP IP/OBS HIGH 50: CPT

## 2018-04-09 PROCEDURE — 82550 ASSAY OF CK (CPK): CPT

## 2018-04-09 PROCEDURE — 83930 ASSAY OF BLOOD OSMOLALITY: CPT

## 2018-04-09 PROCEDURE — 93005 ELECTROCARDIOGRAM TRACING: CPT

## 2018-04-09 PROCEDURE — 81001 URINALYSIS AUTO W/SCOPE: CPT

## 2018-04-09 PROCEDURE — 85610 PROTHROMBIN TIME: CPT

## 2018-04-09 PROCEDURE — 71046 X-RAY EXAM CHEST 2 VIEWS: CPT

## 2018-04-09 PROCEDURE — 85027 COMPLETE CBC AUTOMATED: CPT

## 2018-04-09 PROCEDURE — 96372 THER/PROPH/DIAG INJ SC/IM: CPT | Mod: XU

## 2018-04-09 PROCEDURE — 82962 GLUCOSE BLOOD TEST: CPT

## 2018-04-09 PROCEDURE — 83735 ASSAY OF MAGNESIUM: CPT

## 2018-04-09 PROCEDURE — 93351 STRESS TTE COMPLETE: CPT | Mod: 26

## 2018-04-09 PROCEDURE — 80053 COMPREHEN METABOLIC PANEL: CPT

## 2018-04-09 PROCEDURE — 83690 ASSAY OF LIPASE: CPT

## 2018-04-09 PROCEDURE — 75571 CT HRT W/O DYE W/CA TEST: CPT

## 2018-04-09 PROCEDURE — 84484 ASSAY OF TROPONIN QUANT: CPT

## 2018-04-09 PROCEDURE — 83036 HEMOGLOBIN GLYCOSYLATED A1C: CPT

## 2018-04-09 PROCEDURE — 75571 CT HRT W/O DYE W/CA TEST: CPT | Mod: 26

## 2018-04-09 PROCEDURE — 85730 THROMBOPLASTIN TIME PARTIAL: CPT

## 2018-04-09 PROCEDURE — 93351 STRESS TTE COMPLETE: CPT

## 2018-04-09 RX ORDER — LISINOPRIL 2.5 MG/1
1 TABLET ORAL
Qty: 30 | Refills: 0 | OUTPATIENT
Start: 2018-04-09 | End: 2018-05-08

## 2018-04-09 RX ORDER — PANTOPRAZOLE SODIUM 20 MG/1
1 TABLET, DELAYED RELEASE ORAL
Qty: 14 | Refills: 0 | OUTPATIENT
Start: 2018-04-09 | End: 2018-04-22

## 2018-04-09 RX ORDER — INSULIN NPH HUM/REG INSULIN HM 70-30/ML
25 VIAL (ML) SUBCUTANEOUS
Qty: 750 | Refills: 0 | OUTPATIENT
Start: 2018-04-09 | End: 2018-05-08

## 2018-04-09 RX ORDER — LISINOPRIL 2.5 MG/1
1 TABLET ORAL
Qty: 14 | Refills: 0 | OUTPATIENT
Start: 2018-04-09 | End: 2018-04-22

## 2018-04-09 RX ORDER — INSULIN NPH HUM/REG INSULIN HM 70-30/ML
20 VIAL (ML) SUBCUTANEOUS
Qty: 2 | Refills: 0
Start: 2018-04-09 | End: 2018-05-08

## 2018-04-09 RX ORDER — INSULIN NPH HUM/REG INSULIN HM 70-30/ML
25 VIAL (ML) SUBCUTANEOUS
Qty: 2 | Refills: 0 | OUTPATIENT
Start: 2018-04-09 | End: 2018-05-08

## 2018-04-09 RX ORDER — INSULIN NPH HUM/REG INSULIN HM 70-30/ML
20 VIAL (ML) SUBCUTANEOUS
Qty: 600 | Refills: 0
Start: 2018-04-09 | End: 2018-05-08

## 2018-04-09 RX ADMIN — Medication 8 UNIT(S): at 17:19

## 2018-04-09 RX ADMIN — PANTOPRAZOLE SODIUM 40 MILLIGRAM(S): 20 TABLET, DELAYED RELEASE ORAL at 06:28

## 2018-04-09 RX ADMIN — Medication 6: at 08:19

## 2018-04-09 RX ADMIN — Medication 6: at 17:20

## 2018-04-09 RX ADMIN — LISINOPRIL 2.5 MILLIGRAM(S): 2.5 TABLET ORAL at 06:28

## 2018-04-09 NOTE — PROGRESS NOTE ADULT - PROBLEM SELECTOR PLAN 3
Elevated bp with proteinurea on UA  -started lisinopril 2.5 mg po qd. Elevated bp with proteinurea on UA now with stable bp   -c/w lisinopril 2.5 mg po qd.

## 2018-04-09 NOTE — PROGRESS NOTE ADULT - SUBJECTIVE AND OBJECTIVE BOX
INTERVAL HPI/OVERNIGHT EVENTS:  follow up of uncontrolled diabetes    MEDICATIONS  (STANDING):  aspirin enteric coated 81 milliGRAM(s) Oral daily  dextrose 5%. 1000 milliLiter(s) (50 mL/Hr) IV Continuous <Continuous>  dextrose 50% Injectable 12.5 Gram(s) IV Push once  dextrose 50% Injectable 25 Gram(s) IV Push once  dextrose 50% Injectable 25 Gram(s) IV Push once  enoxaparin Injectable 40 milliGRAM(s) SubCutaneous daily  insulin glargine Injectable (LANTUS) 30 Unit(s) SubCutaneous at bedtime  insulin lispro (HumaLOG) corrective regimen sliding scale   SubCutaneous at bedtime  insulin lispro (HumaLOG) corrective regimen sliding scale   SubCutaneous three times a day before meals  insulin lispro Injectable (HumaLOG) 8 Unit(s) SubCutaneous three times a day with meals  lisinopril 2.5 milliGRAM(s) Oral daily  pantoprazole    Tablet 40 milliGRAM(s) Oral before breakfast    MEDICATIONS  (PRN):  acetaminophen   Tablet 650 milliGRAM(s) Oral every 6 hours PRN For Temp greater than 38 C (100.4 F)  acetaminophen   Tablet. 650 milliGRAM(s) Oral every 6 hours PRN Moderate Pain (4 - 6)  dextrose Gel 1 Dose(s) Oral once PRN Blood Glucose LESS THAN 70 milliGRAM(s)/deciliter  glucagon  Injectable 1 milliGRAM(s) IntraMuscular once PRN Glucose LESS THAN 70 milligrams/deciliter  ondansetron Injectable 4 milliGRAM(s) IV Push every 4 hours PRN Nausea and/or Vomiting      Allergies    No Known Allergies    Intolerances        Review of systems: generally feeling well Some blurry vision    Vital Signs Last 24 Hrs  T(C): 36.7 (09 Apr 2018 08:54), Max: 36.9 (08 Apr 2018 22:04)  T(F): 98.1 (09 Apr 2018 08:54), Max: 98.4 (08 Apr 2018 22:04)  HR: 63 (09 Apr 2018 08:54) (57 - 81)  BP: 104/61 (09 Apr 2018 08:54) (104/61 - 133/89)  BP(mean): --  RR: 18 (09 Apr 2018 08:54) (18 - 18)  SpO2: 97% (09 Apr 2018 08:54) (95% - 97%)      LABS:                        11.8   3.1   )-----------( 154      ( 08 Apr 2018 06:20 )             35.5     04-08    135  |  100  |  9.0  ----------------------------<  316<H>  3.8   |  22.0  |  0.89    Ca    8.3<L>      08 Apr 2018 06:20  Phos  2.0     04-07  Mg     1.6     04-07            POCT Blood Glucose.: 259 mg/dL (04-09-18 @ 07:57)  POCT Blood Glucose.: 234 mg/dL (04-08-18 @ 22:04)  POCT Blood Glucose.: 218 mg/dL (04-08-18 @ 20:36)  POCT Blood Glucose.: 276 mg/dL (04-08-18 @ 17:03)  POCT Blood Glucose.: 153 mg/dL (04-08-18 @ 11:47)  POCT Blood Glucose.: 354 mg/dL (04-08-18 @ 07:46)  POCT Blood Glucose.: 328 mg/dL (04-07-18 @ 22:02)  POCT Blood Glucose.: 312 mg/dL (04-07-18 @ 16:32)  POCT Blood Glucose.: 245 mg/dL (04-07-18 @ 11:19)  POCT Blood Glucose.: 189 mg/dL (04-07-18 @ 09:26)  POCT Blood Glucose.: 175 mg/dL (04-07-18 @ 07:37)  POCT Blood Glucose.: 189 mg/dL (04-07-18 @ 05:18)  POCT Blood Glucose.: 200 mg/dL (04-07-18 @ 03:20)  POCT Blood Glucose.: 184 mg/dL (04-07-18 @ 01:16)  POCT Blood Glucose.: 191 mg/dL (04-06-18 @ 22:56)  POCT Blood Glucose.: 363 mg/dL (04-06-18 @ 19:33)  POCT Blood Glucose.: 408 mg/dL (04-06-18 @ 16:54)

## 2018-04-09 NOTE — ADVANCED PRACTICE NURSE CONSULT - RECOMMEDATIONS
continue diabetes self management education  pt to draw and inject all insulin doses while in the hospital using insulin syringe and rn supervision  consider dc pt on novolin 70/30 25 units in am and 20 in pm with meals  glucometer syringes lancets and strips

## 2018-04-09 NOTE — PROGRESS NOTE ADULT - SUBJECTIVE AND OBJECTIVE BOX
Patient is a 47y old  Male who presents with a chief complaint of vomiting, weakness (07 Apr 2018 18:04)    OVERNIGHT EVENTS:  Patient seen and examined at bedside. No acute events overnight. He reports feeling much better than when he came in. He states he does not have chest pain but sometimes gets a burning in his epigastric region. He states his polydipsia, polyuria, dizziness, and nausea has resolved. He states he is eating food and tolerating it well and that he had a BM yesterday. He denies any H/A, dizziness, SOB, wheezing, Abd pain, N/V/D/C, dysuria.    TELE: 4 episodes of sinus bradycardia down to 42BPM.    T(C): 36.7 (04-09-18 @ 08:54), Max: 36.9 (04-08-18 @ 22:04)  HR: 63 (04-09-18 @ 08:54) (57 - 81)  BP: 104/61 (04-09-18 @ 08:54) (104/61 - 133/89)  RR: 18 (04-09-18 @ 08:54) (18 - 18)  SpO2: 97% (04-09-18 @ 08:54) (95% - 97%)    Physical Exam:   GENERAL: NAD, well-groomed, well-developed  HEAD:  Atraumatic, Normocephalic  EYES: EOMI, PERRLA, conjunctiva and sclera clear  ENMT: No tonsillar erythema, exudates, or enlargement; Moist mucous membranes, Good dentition, No lesions  NECK: Supple, No JVD, Normal thyroid  NERVOUS SYSTEM:  Alert & Oriented X3, Good concentration; Motor Strength 5/5 B/L upper and lower extremities  CHEST/LUNG: Clear to auscultation bilaterally; No rales, rhonchi, wheezing, or rubs  HEART: Regular rate and rhythm; No murmurs, rubs, or gallops  ABDOMEN: Soft, Nontender, Nondistended; Bowel sounds present  EXTREMITIES:  2+ Peripheral Pulses, No clubbing, cyanosis, or edema  LYMPH: No cervical or supraclavicular lymphadenopathy noted  SKIN: No rashes or lesions    Labs  No new labs    MEDICATIONS  (STANDING):  aspirin enteric coated 81 milliGRAM(s) Oral daily  dextrose 5%. 1000 milliLiter(s) (50 mL/Hr) IV Continuous <Continuous>  dextrose 50% Injectable 12.5 Gram(s) IV Push once  dextrose 50% Injectable 25 Gram(s) IV Push once  dextrose 50% Injectable 25 Gram(s) IV Push once  enoxaparin Injectable 40 milliGRAM(s) SubCutaneous daily  insulin glargine Injectable (LANTUS) 30 Unit(s) SubCutaneous at bedtime  insulin lispro (HumaLOG) corrective regimen sliding scale   SubCutaneous at bedtime  insulin lispro (HumaLOG) corrective regimen sliding scale   SubCutaneous three times a day before meals  insulin lispro Injectable (HumaLOG) 8 Unit(s) SubCutaneous three times a day with meals  lisinopril 2.5 milliGRAM(s) Oral daily  pantoprazole    Tablet 40 milliGRAM(s) Oral before breakfast    MEDICATIONS  (PRN):  acetaminophen   Tablet 650 milliGRAM(s) Oral every 6 hours PRN For Temp greater than 38 C (100.4 F)  acetaminophen   Tablet. 650 milliGRAM(s) Oral every 6 hours PRN Moderate Pain (4 - 6)  dextrose Gel 1 Dose(s) Oral once PRN Blood Glucose LESS THAN 70 milliGRAM(s)/deciliter  glucagon  Injectable 1 milliGRAM(s) IntraMuscular once PRN Glucose LESS THAN 70 milligrams/deciliter  ondansetron Injectable 4 milliGRAM(s) IV Push every 4 hours PRN Nausea and/or Vomiting

## 2018-04-09 NOTE — PROGRESS NOTE ADULT - ATTENDING COMMENTS
atypical chest pain few days ago. Admitted with DKA. not taking any meds coz he was feeling fine. id not see any physician.   .heart calcium score and stress echo.
Note addended where needed
Note addended where needed

## 2018-04-09 NOTE — PROGRESS NOTE ADULT - SUBJECTIVE AND OBJECTIVE BOX
[place] [facility]                                                        Office: [address] [address]                                                       Telephone: [phone]. Fax:[phone]                                                                             PROGRESS NOTE   Reason for follow up: Chest pain                            Overnight: No new events.   Update: got stress echo done. NO ischemia. good exercie capacity  CALCIUm score 0.    Subjective: "  i am feeling ok "   Complains of: no new symptoms.   Review of symptoms: Cardiac:  No chest pain. No dyspnea. No palpitations.  Respiratory:no cough. No dyspnea  Gastrointestinal: No diarrhea. No abdominal pain. No bleeding.     Past medical history: No updates.   Chronic conditions:  Hypertension: controlled.   	  Vitals:  Vital Signs Last 24 Hrs  T(C): 36.3 (04-09-18 @ 15:26), Max: 36.9 (04-08-18 @ 22:04)  T(F): 97.3 (04-09-18 @ 15:26), Max: 98.4 (04-08-18 @ 22:04)  HR: 79 (04-09-18 @ 15:26) (57 - 81)  BP: 106/65 (04-09-18 @ 15:26) (104/61 - 120/55)  BP(mean): --  RR: 18 (04-09-18 @ 15:26) (18 - 18)  SpO2: 97% (04-09-18 @ 08:54) (95% - 97%)    PHYSICAL EXAM:  Appearance: Comfortable. No acute distress  HEENT:  Head and neck: Atraumatic. Normocephalic.  Normal oral mucosa, PERRL, Neck is supple. No JVD, No carotid bruit.   Neurologic: A & O x 3, no focal deficits. EOMI , Cranial nerves are intact.  Lymphatic: No cervical lymphadenopathy  Cardiovascular: Normal S1 S2, No murmur, rubs/gallops. No JVD, No edema  Respiratory: Lungs clear to auscultation  Gastrointestinal:  Soft, Non-tender, + BS  Lower Extremities: No edema  Psychiatry: Patient is calm. No agitation. Mood & affect appropriate  Skin: No rashes/ ecchymoses/cyanosis/ulcers visualized on the face, hands or feet.    CURRENT MEDICATIONS:  lisinopril 2.5 milliGRAM(s) Oral daily    pantoprazole    Tablet  dextrose 50% Injectable  dextrose 50% Injectable  dextrose 50% Injectable  insulin glargine Injectable (LANTUS)  insulin lispro (HumaLOG) corrective regimen sliding scale  insulin lispro (HumaLOG) corrective regimen sliding scale  insulin lispro Injectable (HumaLOG)  aspirin enteric coated  dextrose 5%.  enoxaparin Injectable      LABS:	 	  CARDIAC MARKERS ( 07 Apr 2018 20:06 )  x     / <0.01 ng/mL / 153 U/L / x     / 1.4 ng/mL  p-BNP 07 Apr 2018 20:06: x    , CARDIAC MARKERS ( 07 Apr 2018 06:52 )  x     / <0.01 ng/mL / x     / x     / x      p-BNP 07 Apr 2018 06:52: x                                11.8   3.1   )-----------( 154      ( 08 Apr 2018 06:20 )             35.5     04-08    135  |  100  |  9.0  ----------------------------<  316<H>  3.8   |  22.0  |  0.89    Ca    8.3<L>      08 Apr 2018 06:20      proBNP:   Lipid Profile:   HgA1c: Hemoglobin A1C, Whole Blood: 14.8 %  TSH:     TELEMETRY: Reviewed    ECG:  Reviewed by me. 	Sinus rhyhtm     DIAGNOSTIC TESTING:  [ ] Echocardiogram: Normal wall motion. No significant valvular abnormality.     [ ] Stress Test:  No ischemia. good exercise capacity. METs 10.   CAlcium score: 0

## 2018-04-09 NOTE — PROGRESS NOTE ADULT - PROBLEM SELECTOR PLAN 1
Atypical pain. Stress echo normakl./ Calcium score : 0   No further in-patient cardiac work-up/management is needed.  Follow-up in cardiology office in 2 weeks.   Thank you for allowing me to participate in care of your patient.   Please call as needed.

## 2018-04-09 NOTE — PROGRESS NOTE ADULT - ASSESSMENT
Improving glycemic control. 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 Plan is for discharge on 70/30 novolin by syringe as pt. will not be able to afford lantus and humalog.  Outpatient dose of 20 units of novolin 70/30 AC breakfast and 25 units AC supper recommended.

## 2018-04-09 NOTE — PROGRESS NOTE ADULT - ASSESSMENT
48y/o male with hx of DM2, briefly on Metformin years ago but didn't follow up with PMD in years, presented with polyuria, polydipsia, N/V, found to have DKA. abnormal EKG.and chest pain

## 2018-04-09 NOTE — PROGRESS NOTE ADULT - PROBLEM SELECTOR PLAN 1
Resolved  -fs remain stable     HBA1C: 14   -Dehydration resolved   -d/c IVF   -AG closed   -morning fs elevated but premeal stable   -will c/w lantus 30U sq qhs   -c/w humalog 8U sq ACHS tid  -c/w HSS ACHS TID and QHS   -c/w hypoglycemia protocol   -pt is self injecting  -Diabetic  consult noted and appreciated  -endocrinology consult noted and appreciated   -pt counseled on medications diet compliance. Resolved  -fs remain stable     HBA1C: 14   -Dehydration resolved   -AG closed   -morning fs elevated but premeal stable   -will c/w lantus 30U sq qhs   -c/w humalog 8U sq ACHS tid  -c/w HSS ACHS TID and QHS   -c/w hypoglycemia protocol   -pt is self injecting  -Diabetic  consult noted and appreciated  -endocrinology consult noted and appreciated, recommend Novolin 20U before breakfast and 25U before dinner as outpatient  -pt counseled on medications diet compliance. Resolved  -fs remain stable     HBA1C: 14   -will c/w lantus 30U sq qhs   -c/w humalog 8U sq ACHS tid  -c/w HSS ACHS TID and QHS   -c/w hypoglycemia protocol   -pt is self injecting  and when discharged to home will be placed on 70/30 , recommended by endo to c/w  Novolin 20U before breakfast and 25U before dinner as outpatient  -Diabetic  consult noted and appreciated  -endocrinology f/u noted and appreciated  -pt counseled on medications diet compliance

## 2018-04-09 NOTE — PROGRESS NOTE ADULT - PROBLEM SELECTOR PLAN 2
-pt with intermittent chest pain concern for CAD vs gerd  -cardiac enzymes negative  -ekg with non specific st changes  -tte with preserved EF no other noted abnormalities  -f/u stress test today  -npo at midnight  -c/w asa 81 mg po qd   -cardio f/u noted and appreciated. -pt with intermittent chest pain concern for CAD vs gerd  -cardiac enzymes negative  -ekg with non specific st changes  -tte with preserved EF no other noted abnormalities  -f/u stress test negative for ischemic changes  -c/w asa 81 mg po qd   -cardio f/u noted and appreciated. -pt with intermittent chest pain concern for CAD vs gerd  -cardiac enzymes negative  -ekg with non specific st changes  -tte with preserved EF no other noted abnormalities  -stress test negative   -coronary calcium ct negative score zero   -c/w asa 81 mg po qd   -cardio f/u noted and appreciated.

## 2018-04-09 NOTE — ADVANCED PRACTICE NURSE CONSULT - ASSESSMENT
pt is a+ox3 c/o 0 pain sitting comfortably on bed. pt was educated about the importance of using insulin @ this time. as per pt and rn pt is comfortable w insulin preparation and injection. pt was also educated about the importance of bg monitoring and was advised to check bg 2-3xdaily. pt verbalized understanding. pt was educated about the importance of not skipping meals and advised him to include all 3 food groups into his meals and snacks. pt was educated about the type of insulin he is on. pt verbalized understanding. pt agreed to fu w [facility] to improve glycemic control

## 2018-04-09 NOTE — PROGRESS NOTE ADULT - ASSESSMENT
47M hx DM was on metformin years ago, stopped after his glycemic control was improved with diet and exercise and has had no follow up with a PMD, currently p/w more than 2 weeks of not feeling well; symptoms of uncontrolled hyperglycemia polyuria/polydipsia and most recently the last few days with nausea, vomiting (nb nb), inability to tolerate po intake and dizziness. Noted to have hyperglycemia fs~400 range, A, ketones in the urine and acetone in the serum. Pt admitted with DKA placed on IVF, insulin drip for which ICU consulted but deemed the pt not a candidate for the ICU if AG continued to improve. AG trended down wards and pt clinically with improved FS. Pt started on lantus/ HSS.  DKA resolved, endocrinology consulted. Patient also with atypical chest pain, non specific ekg changes, negative cardiac enzymes and normal tte, cardiology consulted and pt for stress test today. Pending discharge depending on stress test results.    1: Problem: DKA (diabetic ketoacidoses).    Plan: Resolved  -fs remain stable     HBA1C: 14   -Dehydration resolved   -d/c IVF   -AG closed   -morning fs elevated but premeal stable   -will c/w lantus 30U sq qhs   -c/w humalog 8U sq ACHS tid  -c/w HSS ACHS TID and QHS   -c/w hypoglycemia protocol   -pt is self injecting  -Diabetic  consult noted and appreciated  -endocrinology consult noted and appreciated   -pt counseled on medications diet compliance.  2: Problem: Chest pain, atypical.   Plan: -pt with intermittent chest pain concern for CAD vs gerd  -cardiac enzymes negative  -ekg with non specific st changes  -tte with preserved EF no other noted abnormalities  -f/u stress test today  -npo at midnight  -c/w asa 81 mg po qd   -cardio f/u noted and appreciated.     Problem/Plan - 3:  ·  Problem: Essential hypertension. Plan: Elevated bp with proteinurea on UA  -started lisinopril 2.5 mg po qd.     Problem/Plan - 4:  ·  Problem: Need for prophylactic measure. Plan: Lovenox 40mg sq qd. 47M hx DM was on metformin years ago, stopped after his glycemic control was improved with diet and exercise and has had no follow up with a PMD, currently p/w more than 2 weeks of not feeling well; symptoms of uncontrolled hyperglycemia polyuria/polydipsia and most recently the last few days with nausea, vomiting (nb nb), inability to tolerate po intake and dizziness. Noted to have hyperglycemia fs~400 range, A, ketones in the urine and acetone in the serum. Pt admitted with DKA placed on IVF, insulin drip for which ICU consulted but deemed the pt not a candidate for the ICU if AG continued to improve. AG trended down wards and pt clinically with improved FS. Pt started on lantus/ HSS.  DKA resolved, endocrinology consulted. Patient also with atypical chest pain, non specific ekg changes, negative cardiac enzymes and normal tte, cardiology consulted and pt for stress test today. Pending discharge depending on stress test results. 47 M with hx DM was on metformin years ago, stopped after his glycemic control was improved with diet and exercise and has had no follow up with a PMD, presented with more than 2 weeks of not feeling well; symptoms of uncontrolled hyperglycemia polyuria/polydipsia and most recently the last few days with nausea, vomiting (nb nb), inability to tolerate po intake and dizziness. Noted to have hyperglycemia fs~400 range, A, ketones in the urine and acetone in the serum. Pt admitted with DKA placed on IVF, insulin drip for which ICU consulted but deemed the pt not a candidate for the ICU if AG continued to improve. AG trended down wards and pt improved clinically with improved FS. Pt was taken off the insulin drip, bridged with lantus and humalog, and continued on HSS, FS ACHS TID. HBA1C: 14.8, given N/V uncontrolled fs starvation ketosis also contributing to current sx. Initiated diet and pt tolerated well. Patient improved rapidly with insulin and blood glucose control. Pt also had burning chest pain a few weeks ago, reports that he has intermittent chest pain associates it being worse with food seems atypical and possible GERD related; pt started on PPI, troponins negative. Tele and ekg shows non specific st changes mostly in the anterolateral leads; pt reports fam hx of heart dz but unclear if MI related. Given pt is an uncontrolled DM and EKG changes cardio consulted. TTE revealed LVEF 60-65% with no structural abnormalities. Patient received a cardiac stress test which was negative and a cardiac CT calcium score of zero.   Patient was seen by endocrinology who recommended outpatient treatment with Novolin 70/30 considering patients insurance status. They recommended 20U before breakfast and 25U before dinner everyday. Patient has a follow up appointment at [facility] clinic on 2017 with Dr. Vergara. Patient was educated on need for diabetic management and insulin use.

## 2018-05-29 ENCOUNTER — APPOINTMENT (OUTPATIENT)
Dept: CARDIOLOGY | Facility: CLINIC | Age: 48
End: 2018-05-29

## 2019-03-06 ENCOUNTER — EMERGENCY (EMERGENCY)
Facility: HOSPITAL | Age: 49
LOS: 1 days | Discharge: HOME | End: 2019-03-06
Attending: EMERGENCY MEDICINE
Payer: COMMERCIAL

## 2019-03-06 VITALS
HEART RATE: 98 BPM | HEIGHT: 70 IN | WEIGHT: 250 LBS | RESPIRATION RATE: 20 BRPM | OXYGEN SATURATION: 97 % | SYSTOLIC BLOOD PRESSURE: 109 MMHG | DIASTOLIC BLOOD PRESSURE: 76 MMHG | TEMPERATURE: 98 F

## 2019-03-06 PROBLEM — H53.8 OTHER VISUAL DISTURBANCES: Chronic | Status: ACTIVE | Noted: 2018-04-07

## 2019-03-06 LAB
ACETONE SERPL-MCNC: ABNORMAL
ALBUMIN SERPL ELPH-MCNC: 4.9 G/DL — SIGNIFICANT CHANGE UP (ref 3.3–5.2)
ALP SERPL-CCNC: 144 U/L — HIGH (ref 40–120)
ALT FLD-CCNC: 23 U/L — SIGNIFICANT CHANGE UP
ANION GAP SERPL CALC-SCNC: 17 MMOL/L — SIGNIFICANT CHANGE UP (ref 5–17)
APPEARANCE UR: CLEAR — SIGNIFICANT CHANGE UP
AST SERPL-CCNC: 18 U/L — SIGNIFICANT CHANGE UP
BACTERIA # UR AUTO: ABNORMAL
BASOPHILS # BLD AUTO: 0 K/UL — SIGNIFICANT CHANGE UP (ref 0–0.2)
BASOPHILS NFR BLD AUTO: 0.1 % — SIGNIFICANT CHANGE UP (ref 0–2)
BILIRUB SERPL-MCNC: 0.6 MG/DL — SIGNIFICANT CHANGE UP (ref 0.4–2)
BILIRUB UR-MCNC: NEGATIVE — SIGNIFICANT CHANGE UP
BUN SERPL-MCNC: 12 MG/DL — SIGNIFICANT CHANGE UP (ref 8–20)
CALCIUM SERPL-MCNC: 9.8 MG/DL — SIGNIFICANT CHANGE UP (ref 8.6–10.2)
CHLORIDE SERPL-SCNC: 97 MMOL/L — LOW (ref 98–107)
CO2 SERPL-SCNC: 24 MMOL/L — SIGNIFICANT CHANGE UP (ref 22–29)
COLOR SPEC: YELLOW — SIGNIFICANT CHANGE UP
CREAT SERPL-MCNC: 1.42 MG/DL — HIGH (ref 0.5–1.3)
DIFF PNL FLD: ABNORMAL
EOSINOPHIL # BLD AUTO: 0 K/UL — SIGNIFICANT CHANGE UP (ref 0–0.5)
EOSINOPHIL NFR BLD AUTO: 0.1 % — SIGNIFICANT CHANGE UP (ref 0–5)
EPI CELLS # UR: SIGNIFICANT CHANGE UP
GAS PNL BLDV: SIGNIFICANT CHANGE UP
GLUCOSE SERPL-MCNC: 491 MG/DL — HIGH (ref 70–115)
GLUCOSE UR QL: 1000 MG/DL
HCO3 BLDV-SCNC: 24 MMOL/L — SIGNIFICANT CHANGE UP (ref 20–26)
HCT VFR BLD CALC: 47.9 % — SIGNIFICANT CHANGE UP (ref 42–52)
HGB BLD-MCNC: 16.8 G/DL — SIGNIFICANT CHANGE UP (ref 14–18)
KETONES UR-MCNC: ABNORMAL
LEUKOCYTE ESTERASE UR-ACNC: ABNORMAL
LYMPHOCYTES # BLD AUTO: 1 K/UL — SIGNIFICANT CHANGE UP (ref 1–4.8)
LYMPHOCYTES # BLD AUTO: 11.7 % — LOW (ref 20–55)
MCHC RBC-ENTMCNC: 30.9 PG — SIGNIFICANT CHANGE UP (ref 27–31)
MCHC RBC-ENTMCNC: 35.1 G/DL — SIGNIFICANT CHANGE UP (ref 32–36)
MCV RBC AUTO: 88.2 FL — SIGNIFICANT CHANGE UP (ref 80–94)
MONOCYTES # BLD AUTO: 0.5 K/UL — SIGNIFICANT CHANGE UP (ref 0–0.8)
MONOCYTES NFR BLD AUTO: 5.7 % — SIGNIFICANT CHANGE UP (ref 3–10)
NEUTROPHILS # BLD AUTO: 6.7 K/UL — SIGNIFICANT CHANGE UP (ref 1.8–8)
NEUTROPHILS NFR BLD AUTO: 82.3 % — HIGH (ref 37–73)
NITRITE UR-MCNC: NEGATIVE — SIGNIFICANT CHANGE UP
PCO2 BLDV: 51 MMHG — HIGH (ref 35–50)
PH BLDV: 7.36 — SIGNIFICANT CHANGE UP (ref 7.32–7.43)
PH UR: 5 — SIGNIFICANT CHANGE UP (ref 5–8)
PLATELET # BLD AUTO: 223 K/UL — SIGNIFICANT CHANGE UP (ref 150–400)
PO2 BLDV: 24 MMHG — LOW (ref 25–45)
POTASSIUM SERPL-MCNC: 4.6 MMOL/L — SIGNIFICANT CHANGE UP (ref 3.5–5.3)
POTASSIUM SERPL-SCNC: 4.6 MMOL/L — SIGNIFICANT CHANGE UP (ref 3.5–5.3)
PROT SERPL-MCNC: 9.1 G/DL — HIGH (ref 6.6–8.7)
PROT UR-MCNC: 30 MG/DL
RBC # BLD: 5.43 M/UL — SIGNIFICANT CHANGE UP (ref 4.6–6.2)
RBC # FLD: 12.9 % — SIGNIFICANT CHANGE UP (ref 11–15.6)
RBC CASTS # UR COMP ASSIST: SIGNIFICANT CHANGE UP /HPF (ref 0–4)
SAO2 % BLDV: 37 % — SIGNIFICANT CHANGE UP
SODIUM SERPL-SCNC: 138 MMOL/L — SIGNIFICANT CHANGE UP (ref 135–145)
SP GR SPEC: 1.01 — SIGNIFICANT CHANGE UP (ref 1.01–1.02)
UROBILINOGEN FLD QL: NEGATIVE MG/DL — SIGNIFICANT CHANGE UP
WBC # BLD: 8.2 K/UL — SIGNIFICANT CHANGE UP (ref 4.8–10.8)
WBC # FLD AUTO: 8.2 K/UL — SIGNIFICANT CHANGE UP (ref 4.8–10.8)
WBC UR QL: SIGNIFICANT CHANGE UP

## 2019-03-06 PROCEDURE — 96361 HYDRATE IV INFUSION ADD-ON: CPT

## 2019-03-06 PROCEDURE — 82009 KETONE BODYS QUAL: CPT

## 2019-03-06 PROCEDURE — 87086 URINE CULTURE/COLONY COUNT: CPT

## 2019-03-06 PROCEDURE — 85027 COMPLETE CBC AUTOMATED: CPT

## 2019-03-06 PROCEDURE — 99284 EMERGENCY DEPT VISIT MOD MDM: CPT | Mod: 25

## 2019-03-06 PROCEDURE — 71045 X-RAY EXAM CHEST 1 VIEW: CPT | Mod: 26

## 2019-03-06 PROCEDURE — 80053 COMPREHEN METABOLIC PANEL: CPT

## 2019-03-06 PROCEDURE — 93005 ELECTROCARDIOGRAM TRACING: CPT

## 2019-03-06 PROCEDURE — 82962 GLUCOSE BLOOD TEST: CPT

## 2019-03-06 PROCEDURE — 96372 THER/PROPH/DIAG INJ SC/IM: CPT | Mod: XU

## 2019-03-06 PROCEDURE — 71045 X-RAY EXAM CHEST 1 VIEW: CPT

## 2019-03-06 PROCEDURE — 93010 ELECTROCARDIOGRAM REPORT: CPT

## 2019-03-06 PROCEDURE — 96375 TX/PRO/DX INJ NEW DRUG ADDON: CPT

## 2019-03-06 PROCEDURE — 36415 COLL VENOUS BLD VENIPUNCTURE: CPT

## 2019-03-06 PROCEDURE — 82803 BLOOD GASES ANY COMBINATION: CPT

## 2019-03-06 PROCEDURE — 96374 THER/PROPH/DIAG INJ IV PUSH: CPT

## 2019-03-06 PROCEDURE — 81001 URINALYSIS AUTO W/SCOPE: CPT

## 2019-03-06 RX ORDER — SODIUM CHLORIDE 9 MG/ML
2000 INJECTION, SOLUTION INTRAVENOUS ONCE
Qty: 0 | Refills: 0 | Status: COMPLETED | OUTPATIENT
Start: 2019-03-06 | End: 2019-03-06

## 2019-03-06 RX ORDER — SODIUM CHLORIDE 9 MG/ML
3 INJECTION INTRAMUSCULAR; INTRAVENOUS; SUBCUTANEOUS ONCE
Qty: 0 | Refills: 0 | Status: COMPLETED | OUTPATIENT
Start: 2019-03-06 | End: 2019-03-06

## 2019-03-06 RX ORDER — INSULIN HUMAN 100 [IU]/ML
10 INJECTION, SOLUTION SUBCUTANEOUS ONCE
Qty: 0 | Refills: 0 | Status: DISCONTINUED | OUTPATIENT
Start: 2019-03-06 | End: 2019-03-06

## 2019-03-06 RX ORDER — INSULIN HUMAN 100 [IU]/ML
4 INJECTION, SOLUTION SUBCUTANEOUS ONCE
Qty: 0 | Refills: 0 | Status: COMPLETED | OUTPATIENT
Start: 2019-03-06 | End: 2019-03-06

## 2019-03-06 RX ORDER — INSULIN NPH HUM/REG INSULIN HM 70-30/ML
25 VIAL (ML) SUBCUTANEOUS
Qty: 750 | Refills: 0
Start: 2019-03-06 | End: 2019-04-04

## 2019-03-06 RX ORDER — INSULIN NPH HUM/REG INSULIN HM 70-30/ML
20 VIAL (ML) SUBCUTANEOUS
Qty: 2 | Refills: 0
Start: 2019-03-06 | End: 2019-04-04

## 2019-03-06 RX ORDER — INSULIN HUMAN 100 [IU]/ML
6 INJECTION, SOLUTION SUBCUTANEOUS ONCE
Qty: 0 | Refills: 0 | Status: COMPLETED | OUTPATIENT
Start: 2019-03-06 | End: 2019-03-06

## 2019-03-06 RX ORDER — ONDANSETRON 8 MG/1
4 TABLET, FILM COATED ORAL ONCE
Qty: 0 | Refills: 0 | Status: COMPLETED | OUTPATIENT
Start: 2019-03-06 | End: 2019-03-06

## 2019-03-06 RX ADMIN — SODIUM CHLORIDE 2000 MILLILITER(S): 9 INJECTION, SOLUTION INTRAVENOUS at 09:18

## 2019-03-06 RX ADMIN — SODIUM CHLORIDE 3 MILLILITER(S): 9 INJECTION INTRAMUSCULAR; INTRAVENOUS; SUBCUTANEOUS at 08:15

## 2019-03-06 RX ADMIN — SODIUM CHLORIDE 2000 MILLILITER(S): 9 INJECTION, SOLUTION INTRAVENOUS at 08:18

## 2019-03-06 RX ADMIN — INSULIN HUMAN 4 UNIT(S): 100 INJECTION, SOLUTION SUBCUTANEOUS at 10:01

## 2019-03-06 RX ADMIN — ONDANSETRON 4 MILLIGRAM(S): 8 TABLET, FILM COATED ORAL at 08:18

## 2019-03-06 RX ADMIN — INSULIN HUMAN 6 UNIT(S): 100 INJECTION, SOLUTION SUBCUTANEOUS at 10:00

## 2019-03-06 NOTE — ED PROVIDER NOTE - CLINICAL SUMMARY MEDICAL DECISION MAKING FREE TEXT BOX
48 year old hx of DM reporting elevated glucose and intermittent n/v x 1 week.  Patient well appearing.  Labs consistent with hyperglycemia no DKA.  Patient given IVF and insulin.  He was given strict instructions to follow-up with [facility] clinic if he has no PMD and given prescription for his medication.

## 2019-03-06 NOTE — ED STATDOCS - PROGRESS NOTE DETAILS
47 y/o M with PMHx of DM presents to the ED for elevated blood sugar, onset 2 weeks ago. Reports dizziness, headache, vomiting, diarrhea, fatigue, and nausea secondary to elevated blood sugar. States measuring blood sugar at home and reports elevated blood sugar of 150-175. Non compliant with medication for 2 days due to running out of medication; takes insulin 70/30 Novolin; 20 in morning and 25 at night. Reports DKA in the past; symptoms similar to DKA in the past. Denies chest pain and dysuria. 49 y/o M with PMHx of DM presents to the ED for elevated blood sugar, onset 2 weeks ago. Reports dizziness, headache, vomiting, diarrhea, fatigue, and nausea secondary to elevated blood sugar. States measuring blood sugar at home and reports elevated blood sugar of 150-175. Non compliant with medication for 2 days due to running out of medication; takes insulin 70/30 Novolin; 20 in morning and 25 at night. Reports DKA in the past; symptoms similar to DKA in the past. Denies chest pain and dysuria. initial orders placed and pt sent to [facility] for further work up.

## 2019-03-06 NOTE — ED ADULT TRIAGE NOTE - CHIEF COMPLAINT QUOTE
Patient states "I believe my sugar is elevated". Patient states that his insulin ran out last week and he has not taken any. Patient states that he is drinking more and urinating more. He has not checked his sugar today

## 2019-03-06 NOTE — ED PROVIDER NOTE - OBJECTIVE STATEMENT
This patient is a 48 year old man with hx of DM who presents to the ER for an evaluation.  Patient believes that his "sugar" is elevated.  He reports 2 weeks of intermittent nausea, vomiting and dizziness.  He also had an episode of loose stool yesterday.  Patient states that he has been out of his medication (novolin 70/30-20 units in am and 25 units in pm) for about 1 week.  He does not usually get a prescription but is able to get the medication from [facility].  Patient states that he cannot make it [facility] until Friday.  He use to follow-up at the [facility] clinic but has not been there in about 1 year.  Currently in the ER patient denies symptoms.

## 2019-03-06 NOTE — ED PROVIDER NOTE - FAMILY HISTORY
Family history of ischemic heart disease     Mother  Still living? Unknown  Family history of diabetes mellitus, Age at diagnosis: Age Unknown

## 2019-03-07 ENCOUNTER — EMERGENCY (EMERGENCY)
Facility: HOSPITAL | Age: 49
LOS: 1 days | Discharge: HOME | End: 2019-03-07
Attending: EMERGENCY MEDICINE
Payer: COMMERCIAL

## 2019-03-07 VITALS
HEART RATE: 70 BPM | SYSTOLIC BLOOD PRESSURE: 145 MMHG | RESPIRATION RATE: 16 BRPM | DIASTOLIC BLOOD PRESSURE: 78 MMHG | OXYGEN SATURATION: 99 %

## 2019-03-07 VITALS
WEIGHT: 250 LBS | OXYGEN SATURATION: 98 % | SYSTOLIC BLOOD PRESSURE: 132 MMHG | DIASTOLIC BLOOD PRESSURE: 75 MMHG | RESPIRATION RATE: 16 BRPM | HEIGHT: 70 IN | TEMPERATURE: 98 F | HEART RATE: 67 BPM

## 2019-03-07 LAB
ACETONE SERPL-MCNC: ABNORMAL
ALBUMIN SERPL ELPH-MCNC: 4.2 G/DL — SIGNIFICANT CHANGE UP (ref 3.3–5.2)
ALP SERPL-CCNC: 124 U/L — HIGH (ref 40–120)
ALT FLD-CCNC: 18 U/L — SIGNIFICANT CHANGE UP
ANION GAP SERPL CALC-SCNC: 15 MMOL/L — SIGNIFICANT CHANGE UP (ref 5–17)
AST SERPL-CCNC: 17 U/L — SIGNIFICANT CHANGE UP
BILIRUB SERPL-MCNC: 0.3 MG/DL — LOW (ref 0.4–2)
BUN SERPL-MCNC: 13 MG/DL — SIGNIFICANT CHANGE UP (ref 8–20)
CALCIUM SERPL-MCNC: 10.2 MG/DL — SIGNIFICANT CHANGE UP (ref 8.6–10.2)
CHLORIDE SERPL-SCNC: 99 MMOL/L — SIGNIFICANT CHANGE UP (ref 98–107)
CO2 SERPL-SCNC: 22 MMOL/L — SIGNIFICANT CHANGE UP (ref 22–29)
CREAT SERPL-MCNC: 1.15 MG/DL — SIGNIFICANT CHANGE UP (ref 0.5–1.3)
CULTURE RESULTS: SIGNIFICANT CHANGE UP
GAS PNL BLDV: SIGNIFICANT CHANGE UP
GLUCOSE SERPL-MCNC: 370 MG/DL — HIGH (ref 70–115)
HCO3 BLDV-SCNC: 23 MMOL/L — SIGNIFICANT CHANGE UP (ref 20–26)
HCT VFR BLD CALC: 43.1 % — SIGNIFICANT CHANGE UP (ref 42–52)
HGB BLD-MCNC: 14.6 G/DL — SIGNIFICANT CHANGE UP (ref 14–18)
MCHC RBC-ENTMCNC: 30.4 PG — SIGNIFICANT CHANGE UP (ref 27–31)
MCHC RBC-ENTMCNC: 33.9 G/DL — SIGNIFICANT CHANGE UP (ref 32–36)
MCV RBC AUTO: 89.6 FL — SIGNIFICANT CHANGE UP (ref 80–94)
PCO2 BLDV: 55 MMHG — HIGH (ref 35–50)
PH BLDV: 7.3 — LOW (ref 7.32–7.43)
PLATELET # BLD AUTO: 208 K/UL — SIGNIFICANT CHANGE UP (ref 150–400)
PO2 BLDV: 31 MMHG — SIGNIFICANT CHANGE UP (ref 25–45)
POTASSIUM SERPL-MCNC: 4.5 MMOL/L — SIGNIFICANT CHANGE UP (ref 3.5–5.3)
POTASSIUM SERPL-SCNC: 4.5 MMOL/L — SIGNIFICANT CHANGE UP (ref 3.5–5.3)
PROT SERPL-MCNC: 8.1 G/DL — SIGNIFICANT CHANGE UP (ref 6.6–8.7)
RBC # BLD: 4.81 M/UL — SIGNIFICANT CHANGE UP (ref 4.6–6.2)
RBC # FLD: 12.7 % — SIGNIFICANT CHANGE UP (ref 11–15.6)
SAO2 % BLDV: 53 % — SIGNIFICANT CHANGE UP
SODIUM SERPL-SCNC: 136 MMOL/L — SIGNIFICANT CHANGE UP (ref 135–145)
SPECIMEN SOURCE: SIGNIFICANT CHANGE UP
WBC # BLD: 6.2 K/UL — SIGNIFICANT CHANGE UP (ref 4.8–10.8)
WBC # FLD AUTO: 6.2 K/UL — SIGNIFICANT CHANGE UP (ref 4.8–10.8)

## 2019-03-07 PROCEDURE — 80053 COMPREHEN METABOLIC PANEL: CPT

## 2019-03-07 PROCEDURE — 82009 KETONE BODYS QUAL: CPT

## 2019-03-07 PROCEDURE — 99284 EMERGENCY DEPT VISIT MOD MDM: CPT

## 2019-03-07 PROCEDURE — 85027 COMPLETE CBC AUTOMATED: CPT

## 2019-03-07 PROCEDURE — 96361 HYDRATE IV INFUSION ADD-ON: CPT

## 2019-03-07 PROCEDURE — 36415 COLL VENOUS BLD VENIPUNCTURE: CPT

## 2019-03-07 PROCEDURE — 82962 GLUCOSE BLOOD TEST: CPT

## 2019-03-07 PROCEDURE — 82803 BLOOD GASES ANY COMBINATION: CPT

## 2019-03-07 PROCEDURE — 96360 HYDRATION IV INFUSION INIT: CPT

## 2019-03-07 PROCEDURE — 99283 EMERGENCY DEPT VISIT LOW MDM: CPT | Mod: 25

## 2019-03-07 RX ORDER — INSULIN HUMAN 100 [IU]/ML
4 INJECTION, SOLUTION SUBCUTANEOUS ONCE
Qty: 0 | Refills: 0 | Status: COMPLETED | OUTPATIENT
Start: 2019-03-07 | End: 2019-03-07

## 2019-03-07 RX ORDER — SODIUM CHLORIDE 9 MG/ML
2000 INJECTION INTRAMUSCULAR; INTRAVENOUS; SUBCUTANEOUS ONCE
Qty: 0 | Refills: 0 | Status: COMPLETED | OUTPATIENT
Start: 2019-03-07 | End: 2019-03-07

## 2019-03-07 RX ADMIN — INSULIN HUMAN 4 UNIT(S): 100 INJECTION, SOLUTION SUBCUTANEOUS at 17:15

## 2019-03-07 RX ADMIN — SODIUM CHLORIDE 2000 MILLILITER(S): 9 INJECTION INTRAMUSCULAR; INTRAVENOUS; SUBCUTANEOUS at 13:10

## 2019-03-07 RX ADMIN — SODIUM CHLORIDE 2000 MILLILITER(S): 9 INJECTION INTRAMUSCULAR; INTRAVENOUS; SUBCUTANEOUS at 15:00

## 2019-03-07 NOTE — ED ADULT TRIAGE NOTE - CHIEF COMPLAINT QUOTE
"My sugar levels are off, they sent me home with high sugars and told me to see my PMD, my sugar level was checked today at [facility] and it registered HI and they told me to come here immediately. "My sugar levels are off, they sent me home with high sugars and told me to see my PMD, my sugar level was checked today at [facility] and it registered 507 and they told me to come here immediately.

## 2019-03-07 NOTE — ED STATDOCS - NS_ ATTENDINGSCRIBEDETAILS _ED_A_ED_FT
I, Cameron Graf, performed the initial face to face bedside interview with this patient regarding history of present illness, review of symptoms and relevant past medical, social and family history.  I completed an independent physical examination.  I was the initial provider who evaluated this patient. I have signed out the follow up of any pending tests (i.e. labs, radiological studies) to the ACP.  I have communicated the patient’s plan of care and disposition with the ACP.  The history, relevant review of systems, past medical and surgical history, medical decision making, and physical examination was documented by the scribe in my presence and I attest to the accuracy of the documentation. I, Cameron Graf, performed the initial face to face bedside interview with this patient regarding history of present illness, review of symptoms and relevant past medical, social and family history.  I completed an independent physical examination.    The history, relevant review of systems, past medical and surgical history, medical decision making, and physical examination was documented by the scribe in my presence and I attest to the accuracy of the documentation.

## 2019-03-07 NOTE — ED PROVIDER NOTE - CLINICAL SUMMARY MEDICAL DECISION MAKING FREE TEXT BOX
Hyperglycemia patient not in DKA.  He reports that he now has access to his mediations.  Patient given IVF and SUbQ insulin and discharged with instructions to take his medications and follow-up with the clinic.

## 2019-03-07 NOTE — ED ADULT NURSE NOTE - NSIMPLEMENTINTERV_GEN_ALL_ED
Implemented All Universal Safety Interventions:  [place] to call system. Call bell, personal items and telephone within reach. Instruct patient to call for assistance. Room bathroom lighting operational. Non-slip footwear when patient is off stretcher. Physically safe environment: no spills, clutter or unnecessary equipment. Stretcher in lowest position, wheels locked, appropriate side rails in place.

## 2019-03-07 NOTE — ED STATDOCS - PROGRESS NOTE DETAILS
47 y/o M pt with hx of DM presents to the ED c/o hyperglycemia with assoc. joint pain, urinary frequency and excessive thirst. Pt states he hasn't been taking his insulin, because he hasn't been able to pick it up. Pt states he was seen at [facility] and had his sugar measured to be 507, and was sent into ED for further evaluation. Pt was seen in ED yesterday for same complaint, was DC with Insulin sent to pharmacy, but pt has not been able to pick it up. Denies fever. No further complaints at this time.

## 2019-03-07 NOTE — ED PROVIDER NOTE - OBJECTIVE STATEMENT
This patient is a 48 year old man hx of DM sent from the [facility] clinic for hyperglycemia.  Patient was seen yesterday for high finger stick glucose readings.  He was prescribed medications but his sister was not able to  his medications until he was already at the clinic for follow-up.  Patient states that he did not see a physician but the nurse checked his finger stick and sent him to the ER because the level was over 500.  Patient has no complaints.

## 2019-03-07 NOTE — ED ADULT NURSE NOTE - CHIEF COMPLAINT QUOTE
"My sugar levels are off, they sent me home with high sugars and told me to see my PMD, my sugar level was checked today at [facility] and it registered 507 and they told me to come here immediately.

## 2019-10-22 NOTE — ED STATDOCS - ATTESTATION, MLM
EOMI; PERRL; no drainage or redness I have reviewed and confirmed nurses' notes for patient's medications, allergies, medical history, and surgical history. 21

## 2019-11-04 NOTE — ED ADULT NURSE NOTE - ATTEMPT TO OOB
       Pauline García [facility], [address] no

## 2019-11-07 NOTE — ED STATDOCS - NS ED MD EM SELECTION
50289 Exp Problem Focused - Mod. Complex [Subsequent Evaluation] : a subsequent evaluation for [FreeTextEntry2] : ear issues [Parent] : parent

## 2020-08-28 ENCOUNTER — INPATIENT (INPATIENT)
Facility: HOSPITAL | Age: 50
LOS: 2 days | Discharge: HOME | DRG: 638 | End: 2020-08-31
Attending: HOSPITALIST | Admitting: HOSPITALIST
Payer: COMMERCIAL

## 2020-08-28 VITALS
RESPIRATION RATE: 18 BRPM | TEMPERATURE: 99 F | SYSTOLIC BLOOD PRESSURE: 130 MMHG | WEIGHT: 265 LBS | HEIGHT: 70 IN | OXYGEN SATURATION: 96 % | HEART RATE: 97 BPM | DIASTOLIC BLOOD PRESSURE: 69 MMHG

## 2020-08-28 DIAGNOSIS — R73.9 HYPERGLYCEMIA, UNSPECIFIED: ICD-10-CM

## 2020-08-28 LAB
ACETONE SERPL-MCNC: NEGATIVE — SIGNIFICANT CHANGE UP
ALBUMIN SERPL ELPH-MCNC: 4.6 G/DL — SIGNIFICANT CHANGE UP (ref 3.3–5.2)
ALP SERPL-CCNC: 114 U/L — SIGNIFICANT CHANGE UP (ref 40–120)
ALT FLD-CCNC: 22 U/L — SIGNIFICANT CHANGE UP
ANION GAP SERPL CALC-SCNC: 19 MMOL/L — HIGH (ref 5–17)
APPEARANCE UR: CLEAR — SIGNIFICANT CHANGE UP
AST SERPL-CCNC: 19 U/L — SIGNIFICANT CHANGE UP
BASE EXCESS BLDV CALC-SCNC: -3.2 MMOL/L — LOW (ref -2–2)
BASOPHILS # BLD AUTO: 0.01 K/UL — SIGNIFICANT CHANGE UP (ref 0–0.2)
BASOPHILS NFR BLD AUTO: 0.1 % — SIGNIFICANT CHANGE UP (ref 0–2)
BILIRUB SERPL-MCNC: 0.5 MG/DL — SIGNIFICANT CHANGE UP (ref 0.4–2)
BILIRUB UR-MCNC: NEGATIVE — SIGNIFICANT CHANGE UP
BUN SERPL-MCNC: 22 MG/DL — HIGH (ref 8–20)
CA-I SERPL-SCNC: 1.19 MMOL/L — SIGNIFICANT CHANGE UP (ref 1.15–1.33)
CALCIUM SERPL-MCNC: 10 MG/DL — SIGNIFICANT CHANGE UP (ref 8.6–10.2)
CHLORIDE BLDV-SCNC: 96 MMOL/L — LOW (ref 98–107)
CHLORIDE SERPL-SCNC: 91 MMOL/L — LOW (ref 98–107)
CO2 SERPL-SCNC: 19 MMOL/L — LOW (ref 22–29)
COLOR SPEC: YELLOW — SIGNIFICANT CHANGE UP
CREAT SERPL-MCNC: 2.56 MG/DL — HIGH (ref 0.5–1.3)
DIFF PNL FLD: NEGATIVE — SIGNIFICANT CHANGE UP
EOSINOPHIL # BLD AUTO: 0.01 K/UL — SIGNIFICANT CHANGE UP (ref 0–0.5)
EOSINOPHIL NFR BLD AUTO: 0.1 % — SIGNIFICANT CHANGE UP (ref 0–6)
ETHANOL SERPL-MCNC: <10 MG/DL — SIGNIFICANT CHANGE UP
GAS PNL BLDV: 132 MMOL/L — LOW (ref 135–145)
GAS PNL BLDV: SIGNIFICANT CHANGE UP
GAS PNL BLDV: SIGNIFICANT CHANGE UP
GLUCOSE BLDV-MCNC: 674 MG/DL — CRITICAL HIGH (ref 70–99)
GLUCOSE SERPL-MCNC: 710 MG/DL — CRITICAL HIGH (ref 70–99)
GLUCOSE UR QL: 1000 MG/DL
HCO3 BLDV-SCNC: 22 MMOL/L — SIGNIFICANT CHANGE UP (ref 21–29)
HCT VFR BLD CALC: 41.1 % — SIGNIFICANT CHANGE UP (ref 39–50)
HCT VFR BLDA CALC: 45 — SIGNIFICANT CHANGE UP (ref 39–50)
HGB BLD CALC-MCNC: 14.7 G/DL — SIGNIFICANT CHANGE UP (ref 13–17)
HGB BLD-MCNC: 14.4 G/DL — SIGNIFICANT CHANGE UP (ref 13–17)
IMM GRANULOCYTES NFR BLD AUTO: 0.3 % — SIGNIFICANT CHANGE UP (ref 0–1.5)
KETONES UR-MCNC: ABNORMAL
LACTATE BLDV-MCNC: 2.1 MMOL/L — HIGH (ref 0.5–2)
LEUKOCYTE ESTERASE UR-ACNC: NEGATIVE — SIGNIFICANT CHANGE UP
LYMPHOCYTES # BLD AUTO: 0.66 K/UL — LOW (ref 1–3.3)
LYMPHOCYTES # BLD AUTO: 9.7 % — LOW (ref 13–44)
MAGNESIUM SERPL-MCNC: 1.9 MG/DL — SIGNIFICANT CHANGE UP (ref 1.6–2.6)
MCHC RBC-ENTMCNC: 30.6 PG — SIGNIFICANT CHANGE UP (ref 27–34)
MCHC RBC-ENTMCNC: 35 GM/DL — SIGNIFICANT CHANGE UP (ref 32–36)
MCV RBC AUTO: 87.4 FL — SIGNIFICANT CHANGE UP (ref 80–100)
MONOCYTES # BLD AUTO: 0.4 K/UL — SIGNIFICANT CHANGE UP (ref 0–0.9)
MONOCYTES NFR BLD AUTO: 5.9 % — SIGNIFICANT CHANGE UP (ref 2–14)
NEUTROPHILS # BLD AUTO: 5.67 K/UL — SIGNIFICANT CHANGE UP (ref 1.8–7.4)
NEUTROPHILS NFR BLD AUTO: 83.9 % — HIGH (ref 43–77)
NITRITE UR-MCNC: NEGATIVE — SIGNIFICANT CHANGE UP
OTHER CELLS CSF MANUAL: 18 ML/DL — SIGNIFICANT CHANGE UP (ref 18–23)
PCO2 BLDV: 39 MMHG — SIGNIFICANT CHANGE UP (ref 35–50)
PH BLDV: 7.36 — SIGNIFICANT CHANGE UP (ref 7.32–7.43)
PH UR: 5 — SIGNIFICANT CHANGE UP (ref 5–8)
PHOSPHATE SERPL-MCNC: 5.2 MG/DL — HIGH (ref 2.4–4.7)
PLATELET # BLD AUTO: 215 K/UL — SIGNIFICANT CHANGE UP (ref 150–400)
PO2 BLDV: 64 MMHG — HIGH (ref 25–45)
POTASSIUM BLDV-SCNC: 4.3 MMOL/L — SIGNIFICANT CHANGE UP (ref 3.4–4.5)
POTASSIUM SERPL-MCNC: 4.3 MMOL/L — SIGNIFICANT CHANGE UP (ref 3.5–5.3)
POTASSIUM SERPL-SCNC: 4.3 MMOL/L — SIGNIFICANT CHANGE UP (ref 3.5–5.3)
PROT SERPL-MCNC: 8.2 G/DL — SIGNIFICANT CHANGE UP (ref 6.6–8.7)
PROT UR-MCNC: NEGATIVE MG/DL — SIGNIFICANT CHANGE UP
RBC # BLD: 4.7 M/UL — SIGNIFICANT CHANGE UP (ref 4.2–5.8)
RBC # FLD: 12 % — SIGNIFICANT CHANGE UP (ref 10.3–14.5)
SAO2 % BLDV: 92 % — SIGNIFICANT CHANGE UP
SODIUM SERPL-SCNC: 128 MMOL/L — LOW (ref 135–145)
SP GR SPEC: 1.01 — SIGNIFICANT CHANGE UP (ref 1.01–1.02)
UROBILINOGEN FLD QL: NEGATIVE MG/DL — SIGNIFICANT CHANGE UP
WBC # BLD: 6.77 K/UL — SIGNIFICANT CHANGE UP (ref 3.8–10.5)
WBC # FLD AUTO: 6.77 K/UL — SIGNIFICANT CHANGE UP (ref 3.8–10.5)

## 2020-08-28 PROCEDURE — 93010 ELECTROCARDIOGRAM REPORT: CPT

## 2020-08-28 PROCEDURE — 71045 X-RAY EXAM CHEST 1 VIEW: CPT | Mod: 26

## 2020-08-28 PROCEDURE — 99291 CRITICAL CARE FIRST HOUR: CPT

## 2020-08-28 RX ORDER — ASPIRIN/CALCIUM CARB/MAGNESIUM 324 MG
324 TABLET ORAL ONCE
Refills: 0 | Status: COMPLETED | OUTPATIENT
Start: 2020-08-28 | End: 2020-08-28

## 2020-08-28 RX ORDER — SODIUM CHLORIDE 9 MG/ML
1000 INJECTION INTRAMUSCULAR; INTRAVENOUS; SUBCUTANEOUS
Refills: 0 | Status: COMPLETED | OUTPATIENT
Start: 2020-08-28 | End: 2020-08-29

## 2020-08-28 RX ORDER — INSULIN LISPRO 100/ML
15 VIAL (ML) SUBCUTANEOUS ONCE
Refills: 0 | Status: COMPLETED | OUTPATIENT
Start: 2020-08-28 | End: 2020-08-28

## 2020-08-28 RX ORDER — DEXTROSE MONOHYDRATE, SODIUM CHLORIDE, AND POTASSIUM CHLORIDE 50; .745; 4.5 G/1000ML; G/1000ML; G/1000ML
1000 INJECTION, SOLUTION INTRAVENOUS
Refills: 0 | Status: DISCONTINUED | OUTPATIENT
Start: 2020-08-28 | End: 2020-08-28

## 2020-08-28 RX ORDER — SODIUM CHLORIDE 9 MG/ML
2000 INJECTION, SOLUTION INTRAVENOUS ONCE
Refills: 0 | Status: COMPLETED | OUTPATIENT
Start: 2020-08-28 | End: 2020-08-28

## 2020-08-28 RX ORDER — SODIUM CHLORIDE 9 MG/ML
1000 INJECTION INTRAMUSCULAR; INTRAVENOUS; SUBCUTANEOUS ONCE
Refills: 0 | Status: COMPLETED | OUTPATIENT
Start: 2020-08-28 | End: 2020-08-28

## 2020-08-28 RX ADMIN — SODIUM CHLORIDE 2000 MILLILITER(S): 9 INJECTION, SOLUTION INTRAVENOUS at 22:31

## 2020-08-28 RX ADMIN — SODIUM CHLORIDE 4000 MILLILITER(S): 9 INJECTION, SOLUTION INTRAVENOUS at 20:31

## 2020-08-28 RX ADMIN — Medication 15 UNIT(S): at 22:10

## 2020-08-28 RX ADMIN — Medication 324 MILLIGRAM(S): at 20:59

## 2020-08-28 RX ADMIN — DEXTROSE MONOHYDRATE, SODIUM CHLORIDE, AND POTASSIUM CHLORIDE 160 MILLILITER(S): 50; .745; 4.5 INJECTION, SOLUTION INTRAVENOUS at 22:31

## 2020-08-28 NOTE — ED PROVIDER NOTE - NS ED ROS FT
Constitutional: no fever, sweats, and no chills.  Eyes: no pain, no redness, and no visual changes.  ENMT: no ear pain and no hearing problems, no nasal congestion/drainage, no dysphagia, and no throat pain, no neck pain, no stiffness  CV: +chest pain, no edema.  Resp: no cough, no dyspnea  GI: +abdominal pain, no bloating, no constipation, no diarrhea, +n/v  : no dysuria, no hematuria  MSK: no msk pain, no weakness  Skin: no lesions, and no rashes.  Neuro: no LOC, no headache, no sensory deficits, and no weakness, +blurry vision

## 2020-08-28 NOTE — ED PROVIDER NOTE - CLINICAL SUMMARY MEDICAL DECISION MAKING FREE TEXT BOX
Pt is a 50 y.o. M presenting with abdominal pain, nausea, vomiting, FS > upper readable limit of glucometer. IVF, labs, meds, imaging, ekg.

## 2020-08-28 NOTE — ED PROVIDER NOTE - OBJECTIVE STATEMENT
Pt is a 50 y.o. M hx  complaining of dizziness. Pt is a 50 y.o. M hx DM II and HTN presenting with vomiting. The pt states he has been increasingly more fatigued over the last week. +abdominal pain, nausea, emesis x3 NBNB today. Chest pain secondary to emesis, described as substernal. States his finger glucometer has been broken for one week. Denies fever, sweats, chills, shortness of breath, diarrhea, constipation.

## 2020-08-28 NOTE — ED ADULT NURSE NOTE - NSIMPLEMENTINTERV_GEN_ALL_ED
Implemented All Universal Safety Interventions:  [place] to call system. Call bell, personal items and telephone within reach. Instruct patient to call for assistance. Room bathroom lighting operational. Non-slip footwear when patient is off stretcher. Physically safe environment: no spills, clutter or unnecessary equipment. Stretcher in lowest position, wheels locked, appropriate side rails in place.

## 2020-08-28 NOTE — ED PROVIDER NOTE - CARE PLAN
Principal Discharge DX:	Hyperglycemia Principal Discharge DX:	Hyperglycemia  Secondary Diagnosis:	JUN (acute kidney injury)

## 2020-08-28 NOTE — ED ADULT NURSE REASSESSMENT NOTE - NS ED NURSE REASSESS COMMENT FT1
Assume Patient care at 2245. Patient A/Ox4. Denies any chest pain, SOB, Blurry vision or abdominal pain. No N/V/D. IV right forearm patent, NS with 20 meq of potassium infusing at 160ml/hr. potassium level stable. Will recheck BS at  0000 per MD order. Pending dispo

## 2020-08-28 NOTE — ED ADULT TRIAGE NOTE - CHIEF COMPLAINT QUOTE
Pt states, "I am not feeling well". Hx of Type 2DM, compliant with medication (insulin). Pt states he takes metformin in the morning and 20units of insulin at night. Pt states he has not checked his sugar in "months". C/o dizziness, vision changes, nausea, vomiting, abdominal pain, polyuria, polydipsia. Denies headache, polyphagia. POCT BG HIGH. Pt states, "I am not feeling well". Hx of Type 2DM, compliant with medication (insulin). Pt states he takes metformin in the morning and 20units of insulin at night. Pt states he has not checked his sugar in "months". C/o dizziness, vision changes, nausea, vomiting, abdominal pain, polyuria, polydipsia. Denies headache, polyphagia. POCT BG HIGH. Pt brought back to critical, MD Lion at bedside for eval.

## 2020-08-28 NOTE — ED PROVIDER NOTE - PHYSICAL EXAMINATION
General: well appearing, NAD  Head:  NC, AT  Eyes: EOMI, PERRLA, no scleral icterus  Ears: no erythema/drainage  Nose: midline, no bleeding/drainage  Throat: MMM  Cardiac: RRR, no m/r/g, no lower extremity edema  Respiratory: CTABL, no wheezes/rales/rhonchi, equal chest wall expansions  Abdomen: soft, ND, minimal tenderness to palpation of epigastrum, no rebound tenderness, no guarding, nonperitonitic  MSK/Vascular: full ROM, distal pulses intact, soft compartments, warm extremities  Neuro: AAOx3, strength 5/5, sensation to light touch intact, finger to nose coordination intact, cranial nerves 2-12 intact  Psych: calm, cooperative, normal affect

## 2020-08-28 NOTE — ED ADULT NURSE NOTE - OBJECTIVE STATEMENT
pt A&Ox4, c/o epigastric pain n/v, left sided chest pain and dizziness that has worsened x1.5 weeks, resp even and unlabored no distress noted

## 2020-08-28 NOTE — ED PROVIDER NOTE - ATTENDING CONTRIBUTION TO CARE
Pt with marked hyperglycemia, A1c 12 despite adherence to insulin, serum glucose over 700 but no anion gap acidosis or hyperosmolality. Started on bolus sq insulin, fluid resuscitation. Also noted to have acute kidney injury with creatinine over 2.5. Will require admission for glycemic control.

## 2020-08-28 NOTE — ED ADULT NURSE NOTE - CHIEF COMPLAINT QUOTE
Pt states, "I am not feeling well". Hx of Type 2DM, compliant with medication (insulin). Pt states he takes metformin in the morning and 20units of insulin at night. Pt states he has not checked his sugar in "months". C/o dizziness, vision changes, nausea, vomiting, abdominal pain, polyuria, polydipsia. Denies headache, polyphagia. POCT BG HIGH. Pt brought back to critical, MD Lion at bedside for eval.

## 2020-08-29 LAB
ANION GAP SERPL CALC-SCNC: 11 MMOL/L — SIGNIFICANT CHANGE UP (ref 5–17)
ANION GAP SERPL CALC-SCNC: 14 MMOL/L — SIGNIFICANT CHANGE UP (ref 5–17)
BASE EXCESS BLDV CALC-SCNC: -0.8 MMOL/L — SIGNIFICANT CHANGE UP (ref -2–2)
BUN SERPL-MCNC: 14 MG/DL — SIGNIFICANT CHANGE UP (ref 8–20)
BUN SERPL-MCNC: 19 MG/DL — SIGNIFICANT CHANGE UP (ref 8–20)
CA-I SERPL-SCNC: 1.17 MMOL/L — SIGNIFICANT CHANGE UP (ref 1.15–1.33)
CALCIUM SERPL-MCNC: 8.9 MG/DL — SIGNIFICANT CHANGE UP (ref 8.6–10.2)
CALCIUM SERPL-MCNC: 9.4 MG/DL — SIGNIFICANT CHANGE UP (ref 8.6–10.2)
CHLORIDE BLDV-SCNC: 104 MMOL/L — SIGNIFICANT CHANGE UP (ref 98–107)
CHLORIDE SERPL-SCNC: 103 MMOL/L — SIGNIFICANT CHANGE UP (ref 98–107)
CHLORIDE SERPL-SCNC: 99 MMOL/L — SIGNIFICANT CHANGE UP (ref 98–107)
CO2 SERPL-SCNC: 24 MMOL/L — SIGNIFICANT CHANGE UP (ref 22–29)
CO2 SERPL-SCNC: 24 MMOL/L — SIGNIFICANT CHANGE UP (ref 22–29)
CREAT SERPL-MCNC: 1.24 MG/DL — SIGNIFICANT CHANGE UP (ref 0.5–1.3)
CREAT SERPL-MCNC: 2.02 MG/DL — HIGH (ref 0.5–1.3)
GAS PNL BLDV: 138 MMOL/L — SIGNIFICANT CHANGE UP (ref 135–145)
GAS PNL BLDV: SIGNIFICANT CHANGE UP
GAS PNL BLDV: SIGNIFICANT CHANGE UP
GLUCOSE BLDC GLUCOMTR-MCNC: 168 MG/DL — HIGH (ref 70–99)
GLUCOSE BLDC GLUCOMTR-MCNC: 169 MG/DL — HIGH (ref 70–99)
GLUCOSE BLDC GLUCOMTR-MCNC: 183 MG/DL — HIGH (ref 70–99)
GLUCOSE BLDC GLUCOMTR-MCNC: 210 MG/DL — HIGH (ref 70–99)
GLUCOSE BLDC GLUCOMTR-MCNC: 248 MG/DL — HIGH (ref 70–99)
GLUCOSE BLDC GLUCOMTR-MCNC: 347 MG/DL — HIGH (ref 70–99)
GLUCOSE BLDC GLUCOMTR-MCNC: 96 MG/DL — SIGNIFICANT CHANGE UP (ref 70–99)
GLUCOSE BLDV-MCNC: 335 MG/DL — HIGH (ref 70–99)
GLUCOSE SERPL-MCNC: 201 MG/DL — HIGH (ref 70–99)
GLUCOSE SERPL-MCNC: 393 MG/DL — HIGH (ref 70–99)
HCO3 BLDV-SCNC: 24 MMOL/L — SIGNIFICANT CHANGE UP (ref 21–29)
HCT VFR BLDA CALC: 40 — SIGNIFICANT CHANGE UP (ref 39–50)
HGB BLD CALC-MCNC: 13.1 G/DL — SIGNIFICANT CHANGE UP (ref 13–17)
LACTATE BLDV-MCNC: 1.5 MMOL/L — SIGNIFICANT CHANGE UP (ref 0.5–2)
OTHER CELLS CSF MANUAL: 16 ML/DL — LOW (ref 18–23)
PCO2 BLDV: 44 MMHG — SIGNIFICANT CHANGE UP (ref 35–50)
PH BLDV: 7.36 — SIGNIFICANT CHANGE UP (ref 7.32–7.43)
PO2 BLDV: 57 MMHG — HIGH (ref 25–45)
POTASSIUM BLDV-SCNC: 3.8 MMOL/L — SIGNIFICANT CHANGE UP (ref 3.4–4.5)
POTASSIUM SERPL-MCNC: 3.9 MMOL/L — SIGNIFICANT CHANGE UP (ref 3.5–5.3)
POTASSIUM SERPL-MCNC: 4.1 MMOL/L — SIGNIFICANT CHANGE UP (ref 3.5–5.3)
POTASSIUM SERPL-SCNC: 3.9 MMOL/L — SIGNIFICANT CHANGE UP (ref 3.5–5.3)
POTASSIUM SERPL-SCNC: 4.1 MMOL/L — SIGNIFICANT CHANGE UP (ref 3.5–5.3)
SAO2 % BLDV: 89 % — SIGNIFICANT CHANGE UP
SARS-COV-2 IGG SERPL QL IA: NEGATIVE — SIGNIFICANT CHANGE UP
SARS-COV-2 IGM SERPL IA-ACNC: <0.1 INDEX — SIGNIFICANT CHANGE UP
SARS-COV-2 RNA SPEC QL NAA+PROBE: SIGNIFICANT CHANGE UP
SODIUM SERPL-SCNC: 136 MMOL/L — SIGNIFICANT CHANGE UP (ref 135–145)
SODIUM SERPL-SCNC: 138 MMOL/L — SIGNIFICANT CHANGE UP (ref 135–145)
TROPONIN T SERPL-MCNC: <0.01 NG/ML — SIGNIFICANT CHANGE UP (ref 0–0.06)
TROPONIN T SERPL-MCNC: <0.01 NG/ML — SIGNIFICANT CHANGE UP (ref 0–0.06)

## 2020-08-29 PROCEDURE — 12345: CPT | Mod: NC

## 2020-08-29 PROCEDURE — 93306 TTE W/DOPPLER COMPLETE: CPT | Mod: 26

## 2020-08-29 PROCEDURE — 70450 CT HEAD/BRAIN W/O DYE: CPT | Mod: 26

## 2020-08-29 PROCEDURE — 99223 1ST HOSP IP/OBS HIGH 75: CPT

## 2020-08-29 PROCEDURE — 93010 ELECTROCARDIOGRAM REPORT: CPT

## 2020-08-29 PROCEDURE — 93880 EXTRACRANIAL BILAT STUDY: CPT | Mod: 26

## 2020-08-29 RX ORDER — GLUCAGON INJECTION, SOLUTION 0.5 MG/.1ML
1 INJECTION, SOLUTION SUBCUTANEOUS ONCE
Refills: 0 | Status: DISCONTINUED | OUTPATIENT
Start: 2020-08-29 | End: 2020-08-31

## 2020-08-29 RX ORDER — INSULIN GLARGINE 100 [IU]/ML
30 INJECTION, SOLUTION SUBCUTANEOUS AT BEDTIME
Refills: 0 | Status: DISCONTINUED | OUTPATIENT
Start: 2020-08-29 | End: 2020-08-30

## 2020-08-29 RX ORDER — SIMVASTATIN 20 MG/1
40 TABLET, FILM COATED ORAL AT BEDTIME
Refills: 0 | Status: DISCONTINUED | OUTPATIENT
Start: 2020-08-29 | End: 2020-08-30

## 2020-08-29 RX ORDER — INSULIN LISPRO 100/ML
VIAL (ML) SUBCUTANEOUS
Refills: 0 | Status: DISCONTINUED | OUTPATIENT
Start: 2020-08-29 | End: 2020-08-31

## 2020-08-29 RX ORDER — DEXTROSE 50 % IN WATER 50 %
12.5 SYRINGE (ML) INTRAVENOUS ONCE
Refills: 0 | Status: DISCONTINUED | OUTPATIENT
Start: 2020-08-29 | End: 2020-08-31

## 2020-08-29 RX ORDER — SODIUM CHLORIDE 9 MG/ML
1000 INJECTION, SOLUTION INTRAVENOUS
Refills: 0 | Status: DISCONTINUED | OUTPATIENT
Start: 2020-08-29 | End: 2020-08-31

## 2020-08-29 RX ORDER — DEXTROSE 50 % IN WATER 50 %
25 SYRINGE (ML) INTRAVENOUS ONCE
Refills: 0 | Status: DISCONTINUED | OUTPATIENT
Start: 2020-08-29 | End: 2020-08-31

## 2020-08-29 RX ORDER — ASPIRIN/CALCIUM CARB/MAGNESIUM 324 MG
81 TABLET ORAL DAILY
Refills: 0 | Status: DISCONTINUED | OUTPATIENT
Start: 2020-08-29 | End: 2020-08-31

## 2020-08-29 RX ORDER — METOPROLOL TARTRATE 50 MG
12.5 TABLET ORAL
Refills: 0 | Status: DISCONTINUED | OUTPATIENT
Start: 2020-08-29 | End: 2020-08-31

## 2020-08-29 RX ORDER — DEXTROSE 50 % IN WATER 50 %
15 SYRINGE (ML) INTRAVENOUS ONCE
Refills: 0 | Status: DISCONTINUED | OUTPATIENT
Start: 2020-08-29 | End: 2020-08-31

## 2020-08-29 RX ORDER — INSULIN LISPRO 100/ML
10 VIAL (ML) SUBCUTANEOUS ONCE
Refills: 0 | Status: COMPLETED | OUTPATIENT
Start: 2020-08-29 | End: 2020-08-29

## 2020-08-29 RX ORDER — INSULIN LISPRO 100/ML
8 VIAL (ML) SUBCUTANEOUS
Refills: 0 | Status: DISCONTINUED | OUTPATIENT
Start: 2020-08-29 | End: 2020-08-31

## 2020-08-29 RX ORDER — HEPARIN SODIUM 5000 [USP'U]/ML
5000 INJECTION INTRAVENOUS; SUBCUTANEOUS EVERY 8 HOURS
Refills: 0 | Status: DISCONTINUED | OUTPATIENT
Start: 2020-08-29 | End: 2020-08-31

## 2020-08-29 RX ORDER — INSULIN LISPRO 100/ML
VIAL (ML) SUBCUTANEOUS AT BEDTIME
Refills: 0 | Status: DISCONTINUED | OUTPATIENT
Start: 2020-08-29 | End: 2020-08-31

## 2020-08-29 RX ADMIN — Medication 4: at 08:35

## 2020-08-29 RX ADMIN — Medication 4: at 12:11

## 2020-08-29 RX ADMIN — SODIUM CHLORIDE 150 MILLILITER(S): 9 INJECTION INTRAMUSCULAR; INTRAVENOUS; SUBCUTANEOUS at 08:37

## 2020-08-29 RX ADMIN — HEPARIN SODIUM 5000 UNIT(S): 5000 INJECTION INTRAVENOUS; SUBCUTANEOUS at 21:58

## 2020-08-29 RX ADMIN — Medication 10 UNIT(S): at 00:54

## 2020-08-29 RX ADMIN — SODIUM CHLORIDE 150 MILLILITER(S): 9 INJECTION INTRAMUSCULAR; INTRAVENOUS; SUBCUTANEOUS at 02:08

## 2020-08-29 RX ADMIN — Medication 81 MILLIGRAM(S): at 13:59

## 2020-08-29 RX ADMIN — SODIUM CHLORIDE 1000 MILLILITER(S): 9 INJECTION INTRAMUSCULAR; INTRAVENOUS; SUBCUTANEOUS at 00:30

## 2020-08-29 RX ADMIN — SIMVASTATIN 40 MILLIGRAM(S): 20 TABLET, FILM COATED ORAL at 21:58

## 2020-08-29 RX ADMIN — INSULIN GLARGINE 30 UNIT(S): 100 INJECTION, SOLUTION SUBCUTANEOUS at 21:58

## 2020-08-29 RX ADMIN — Medication 8: at 18:12

## 2020-08-29 RX ADMIN — Medication 12.5 MILLIGRAM(S): at 18:10

## 2020-08-29 RX ADMIN — HEPARIN SODIUM 5000 UNIT(S): 5000 INJECTION INTRAVENOUS; SUBCUTANEOUS at 13:59

## 2020-08-29 RX ADMIN — Medication 8 UNIT(S): at 18:10

## 2020-08-29 RX ADMIN — DEXTROSE MONOHYDRATE, SODIUM CHLORIDE, AND POTASSIUM CHLORIDE 1000 MILLILITER(S): 50; .745; 4.5 INJECTION, SOLUTION INTRAVENOUS at 00:20

## 2020-08-29 RX ADMIN — SODIUM CHLORIDE 150 MILLILITER(S): 9 INJECTION INTRAMUSCULAR; INTRAVENOUS; SUBCUTANEOUS at 14:19

## 2020-08-29 NOTE — CONSULT NOTE ADULT - ASSESSMENT
49 y/o M with pmh DM, current smoker, presents to Ed with c/o dizziness, n/v. States Glucometer broke past 2 weeks he had the feeling his BG was running high. In ED glucose 710. Noted to have abnl EKG, new changes since 2019. Pt with normal echo and stress 2018, never followed up in office.    Abnormal EKG  - repeat EKG pending  - trop neg x 1  - Stress 2018- no ischemia  - TTE 2018- Ef 60-65%  - TTE ordered, pending  - pt asymptomatic, but given EKG changes will need ischemic work up Stress or CCTA vs Cath, will discuss with Dr. Stoner    Diabetes- uncontrolled  - A1C 12.4   - tx as per hospital team   - consider diabetic education, nutrition    Smoking  - discussed smoking cessation with pt.   - stated "i've cut down" no plans on quitting at this time.

## 2020-08-29 NOTE — H&P ADULT - HISTORY OF PRESENT ILLNESS
51 y/o male with h/o DM came to the ER as his glucometer broke and for past 2 weeks he had the feeling his BG was running high as he vomited on and off , some liquid in vomitus , no blood, no abd. pain. no diarrhea. no fever. felt dizzy on the day of ER visit, no syncopy or near syncopy, no focal motor weakness, no speech/ swallow trouble. pt. felt dehydrated, upon arrival pt's BG was 710. ph 7.36. as per pt. he was using his insulin Basaglar, and metformin 51 y/o male with h/o DM came to the ER as his glucometer broke and for past 2 weeks he had the feeling his BG was running high as he vomited on and off , some liquid in vomitus , no blood, no abd. pain. no diarrhea. no fever. felt dizzy on the day of ER visit, has h/o blurry vision, no acute vision changes, no syncopy or near syncopy, no focal motor weakness, no speech/ swallow trouble. pt. felt dehydrated, upon arrival pt's BG was 710. ph 7.36. as per pt. he was using his insulin Basaglar, and metformin 49 y/o male with h/o DM came to the ER as his glucometer broke and for past 2 weeks he had the feeling his BG was running high as he vomited on and off , some liquid in vomitus , no blood, no abd. pain. no diarrhea. no fever. felt dizzy on the day of ER visit, has h/o blurry vision, no acute vision changes, no syncopy or near syncopy, no cp, no sob, no focal motor weakness, no speech/ swallow trouble. pt. felt dehydrated, upon arrival pt's BG was 710. ph 7.36. as per pt. he was using his insulin Basaglar, and metformin

## 2020-08-29 NOTE — ED ADULT NURSE REASSESSMENT NOTE - NS ED NURSE REASSESS COMMENT FT1
Patient remains in stable condition, A/Ox4. denies any pain. . MD aware IV fluids changed to NS per MD order. will repeat BMP once NS is completed.

## 2020-08-29 NOTE — CONSULT NOTE ADULT - SUBJECTIVE AND OBJECTIVE BOX
[place] [facility]                                                               Office:  [address] [place] [address]                                                              Telephone: [phone]. Fax:[phone]                                                                        CARDIOLOGY CONSULTATION NOTE                                                                                             Consult requested by:  Dr. cardenas  Reason for Consultation: Abnl EKG  History obtained by: Patient and medical record   obtained: No    Chief complaint:    Patient is a 50y old  Male who presents with a chief complaint of hyperglycemia (29 Aug 2020 00:51)        HPI: 51 y/o M with pmh DM, current smoker, presents to Ed with c/o dizziness, n/v. States Glucometer broke past 2 weeks he had the feeling his BG was running high. In ED glucose 710. Noted to have abnl EKG, new changes since 2019. Pt with normal echo and stress 2018, never followed up in office. Denies fever, chills, cough, phlegm production, shortness of breath, dyspnea on exertion, orthopnea, PND, edema, chest pain, pressure, palpitations, irregular, fast heart beat, syncope, near syncope.       REVIEW OF SYMPTOMS:   CONSTITUTIONAL: No fever, weight loss, or fatigue  ENMT:  No difficulty hearing, tinnitus, vertigo; No sinus or throat pain  NECK: No pain or stiffness  CARDIOVASCULAR: + dizzy No chest pain, dyspnea, syncope, palpitations, Orthopnea, Paroxsymal nocturnal dyspnea  RESPIRATORY: No Dyspnea on exertion, Shortness of breath, cough, wheezing  : No dysuria, no hematuria   GI: + N/V No dark color stool, no melena, no diarrhea, no constipation  NEURO: No headache, no dizziness, no slurred speech   MUSCULOSKELETAL: No joint pain or swelling; No muscle, back, or extremity pain  PSYCH: No agitation, no anxiety.    ALL OTHER REVIEW OF SYSTEMS ARE NEGATIVE.      PREVIOUS DIAGNOSTIC TESTING  ECHO FINDINGS:  < from: TTE Echo Complete w/Doppler (18 @ 15:53) >    Summary:   1. The left atrium is normal in size.   2. Normal wall motion. Left ventricular ejection fraction, by visual   estimation, is 60 to 65%.   3. The right atrium is normal in size.   4. Normal right ventricular size and function.   5. No sigificant valvular abnormality.   6. There is no evidence of pericardial effusion.    [id number] Yane Mccloud MD, RPVI, Electronically signed on 2018 at   5:29:34 PM     < end of copied text >      STRESS FINDINGS:  < from: Stress Echocardiogram (18 @ 12:44) >  Summary:   1. The patient's functional capacity was good. 11 METs. 88% MPHR   2. No cardiac symptoms. No diagnostic ECG changes.      Engel treadmill score = 9 (Low risk)      There were no stress-induced wall motion abnormalities. Normal   diastology and pulmonary pressures post exercise.      This is a negative stress echo test for ischemia.    [id number] Yane Mccloud MD, CARLOS CARRANZA, CARLOS  Electronically signed on 4/10/2018 at 12:36:36 PM      *** Final ***    < end of copied text >        ALLERGIES: Allergies  No Known Allergies  Intolerances      PAST MEDICAL HISTORY  Blurry vision  Diabetes      PAST SURGICAL HISTORY  No significant past surgical history      FAMILY HISTORY:  Family history of diabetes mellitus: mother  Family history of ischemic heart disease      SOCIAL HISTORY:  Denies smoking/alcohol/drugs  CIGARETTES:   2-3 cigarettes/day  ALCOHOL: pint vodka / week ()   DRUGS: denies       CURRENT MEDICATIONS:  metoprolol tartrate 12.5 milliGRAM(s) Oral two times a day  aspirin enteric coated  dextrose 50% Injectable  heparin   Injectable  insulin glargine Injectable (LANTUS)  insulin lispro (HumaLOG) corrective regimen sliding scale  simvastatin  sodium chloride 0.9%.      HOME MEDICATIONS:  lisinopril 2.5mg   metformin  storvastatin      Vital Signs Last 24 Hrs  T(C): 36.6 (29 Aug 2020 07:23), Max: 37.3 (28 Aug 2020 20:10)  T(F): 97.9 (29 Aug 2020 07:23), Max: 99.1 (28 Aug 2020 20:10)  HR: 100 (29 Aug 2020 07:23) (73 - 100)  BP: 120/61 (29 Aug 2020 07:23) (94/59 - 130/69)  BP(mean): 80 (29 Aug 2020 05:59) (71 - 80)  RR: 18 (29 Aug 2020 07:23) (18 - 18)  SpO2: 98% (29 Aug 2020 07:23) (96% - 98%)        PHYSICAL EXAM:  Constitutional: Comfortable. No acute distress.   HEENT: Atraumatic and normocephalic , neck is supple . no JVD. PEERL   CNS: A&Ox3. No focal deficits. EOMI.   Respiratory: CTAB  Cardiovascular: S1S2 RRR. No murmur/rubs or gallop.  Gastrointestinal: Soft non-tender and non distended . +Bowel sounds.   Extremities: No edema.   Psychiatric: Calm . no agitation.  Skin: No skin rash/ulcers visualized to face, hands or feet.      LABS:                     14.4   6.77  )-----------( 215      ( 28 Aug 2020 20:32 )             41.1         136  |  99  |  19.0  ----------------------------<  393<H>  4.1   |  24.0  |  2.02<H>    Ca    9.4      29 Aug 2020 01:16  Phos  5.2       Mg     1.9         TPro  8.2  /  Alb  4.6  /  TBili  0.5  /  DBili  x   /  AST  19  /  ALT  22  /  AlkPhos  114      CARDIAC MARKERS ( 28 Aug 2020 20:32 )  x     / <0.01 ng/mL / x     / x     / x        Urinalysis Basic - ( 28 Aug 2020 21:39 )  Color: Yellow / Appearance: Clear / S.010 / pH: x  Gluc: x / Ketone: Trace  / Bili: Negative / Urobili: Negative mg/dL   Blood: x / Protein: Negative mg/dL / Nitrite: Negative   Leuk Esterase: Negative / RBC: x / WBC x   Sq Epi: x / Non Sq Epi: x / Bacteria: x      INTERPRETATION OF TELEMETRY: Not on telemetry   ECG: SR, TWI III, AvF    RADIOLOGY & ADDITIONAL STUDIES:    X-ray:  reviewed by me.   CT scan:   < from: CT Head No Cont (20 @ 01:44) >    IMPRESSION:  No acute intracranial hemorrhage or vasogenic edema.    < end of copied text > [place] [facility]                                                               Office:  [address] [place] [address]                                                              Telephone: [phone]. Fax:[phone]                                                                        CARDIOLOGY CONSULTATION NOTE                                                                                             Consult requested by:  Dr. cardenas  Reason for Consultation: Abnl EKG  History obtained by: Patient and medical record   obtained: No    Chief complaint:    Patient is a 50y old  Male who presents with a chief complaint of hyperglycemia (29 Aug 2020 00:51)    HPI: 49 y/o M with pmh DM, current smoker, presents to Ed with c/o dizziness, n/v. States Glucometer broke past 2 weeks he had the feeling his BG was running high. In ED glucose 710. Noted to have abnl EKG, new changes since 2019. Pt with normal echo and stress 2018, never followed up in office. Denies fever, chills, cough, phlegm production, shortness of breath, dyspnea on exertion, orthopnea, PND, edema, palpitations, irregular, fast heart beat, syncope, near syncope.     Of note, he has had intermittent chest pain over the past couple of weeks.    REVIEW OF SYMPTOMS:   CONSTITUTIONAL: No fever, weight loss, or fatigue  ENMT:  No difficulty hearing, tinnitus, vertigo; No sinus or throat pain  NECK: No pain or stiffness  CARDIOVASCULAR: + dizzy No chest pain, dyspnea, syncope, palpitations, Orthopnea, Paroxsymal nocturnal dyspnea  RESPIRATORY: No Dyspnea on exertion, Shortness of breath, cough, wheezing  : No dysuria, no hematuria   GI: + N/V No dark color stool, no melena, no diarrhea, no constipation  NEURO: No headache, no dizziness, no slurred speech   MUSCULOSKELETAL: No joint pain or swelling; No muscle, back, or extremity pain  PSYCH: No agitation, no anxiety.    ALL OTHER REVIEW OF SYSTEMS ARE NEGATIVE.      PREVIOUS DIAGNOSTIC TESTING  ECHO FINDINGS:  < from: TTE Echo Complete w/Doppler (18 @ 15:53) >    Summary:   1. The left atrium is normal in size.   2. Normal wall motion. Left ventricular ejection fraction, by visual   estimation, is 60 to 65%.   3. The right atrium is normal in size.   4. Normal right ventricular size and function.   5. No sigificant valvular abnormality.   6. There is no evidence of pericardial effusion.    [id number] Yane Mccloud MD, RPVI, Electronically signed on 2018 at   5:29:34 PM     < end of copied text >      STRESS FINDINGS:  < from: Stress Echocardiogram (18 @ 12:44) >  Summary:   1. The patient's functional capacity was good. 11 METs. 88% MPHR   2. No cardiac symptoms. No diagnostic ECG changes.      Engel treadmill score = 9 (Low risk)      There were no stress-induced wall motion abnormalities. Normal   diastology and pulmonary pressures post exercise.      This is a negative stress echo test for ischemia.    [id number] Yane Mccloud MD, CARLOS GONZALEZ MD  Electronically signed on 4/10/2018 at 12:36:36 PM      *** Final ***    < end of copied text >        ALLERGIES: Allergies  No Known Allergies  Intolerances      PAST MEDICAL HISTORY  Blurry vision  Diabetes      PAST SURGICAL HISTORY  No significant past surgical history      FAMILY HISTORY:  Family history of diabetes mellitus: mother  Family history of ischemic heart disease      SOCIAL HISTORY:  Denies smoking/alcohol/drugs  CIGARETTES:   2-3 cigarettes/day  ALCOHOL: pint vodka / week ()   DRUGS: denies       CURRENT MEDICATIONS:  metoprolol tartrate 12.5 milliGRAM(s) Oral two times a day  aspirin enteric coated  dextrose 50% Injectable  heparin   Injectable  insulin glargine Injectable (LANTUS)  insulin lispro (HumaLOG) corrective regimen sliding scale  simvastatin  sodium chloride 0.9%.      HOME MEDICATIONS:  lisinopril 2.5mg   metformin  storvastatin      Vital Signs Last 24 Hrs  T(C): 36.6 (29 Aug 2020 07:23), Max: 37.3 (28 Aug 2020 20:10)  T(F): 97.9 (29 Aug 2020 07:23), Max: 99.1 (28 Aug 2020 20:10)  HR: 100 (29 Aug 2020 07:23) (73 - 100)  BP: 120/61 (29 Aug 2020 07:23) (94/59 - 130/69)  BP(mean): 80 (29 Aug 2020 05:59) (71 - 80)  RR: 18 (29 Aug 2020 07:23) (18 - 18)  SpO2: 98% (29 Aug 2020 07:23) (96% - 98%)        PHYSICAL EXAM:  Constitutional: Comfortable. No acute distress.   HEENT: Atraumatic and normocephalic , neck is supple . no JVD. PEERL   CNS: A&Ox3. No focal deficits. EOMI.   Respiratory: CTAB  Cardiovascular: S1S2 RRR. No murmur/rubs or gallop.  Gastrointestinal: Soft non-tender and non distended . +Bowel sounds.   Extremities: No edema.   Psychiatric: Calm . no agitation.  Skin: No skin rash/ulcers visualized to face, hands or feet.      LABS:                     14.4   6.77  )-----------( 215      ( 28 Aug 2020 20:32 )             41.1         136  |  99  |  19.0  ----------------------------<  393<H>  4.1   |  24.0  |  2.02<H>    Ca    9.4      29 Aug 2020 01:16  Phos  5.2       Mg     1.9         TPro  8.2  /  Alb  4.6  /  TBili  0.5  /  DBili  x   /  AST  19  /  ALT  22  /  AlkPhos  114      CARDIAC MARKERS ( 28 Aug 2020 20:32 )  x     / <0.01 ng/mL / x     / x     / x        Urinalysis Basic - ( 28 Aug 2020 21:39 )  Color: Yellow / Appearance: Clear / S.010 / pH: x  Gluc: x / Ketone: Trace  / Bili: Negative / Urobili: Negative mg/dL   Blood: x / Protein: Negative mg/dL / Nitrite: Negative   Leuk Esterase: Negative / RBC: x / WBC x   Sq Epi: x / Non Sq Epi: x / Bacteria: x      INTERPRETATION OF TELEMETRY: Not on telemetry   ECG: SR, TWI III, AvF    RADIOLOGY & ADDITIONAL STUDIES:    X-ray:  reviewed by me.   CT scan:   < from: CT Head No Cont (20 @ 01:44) >    IMPRESSION:  No acute intracranial hemorrhage or vasogenic edema.    < end of copied text >

## 2020-08-29 NOTE — H&P ADULT - NSHPPHYSICALEXAM_GEN_ALL_CORE
General: Well developed AA male lying in bed not in distress.   HEENT: AT, NC. PERRL. intact EOM. visual field intact, oral mucosa appears dry. no throat erythema or exudate.   Neck: supple. no JVD.   Chest: CTA bilaterally  Heart: S1,S2. RRR. no heart murmur. no edema.   Abdomen: soft. non-tender. obese, + BS.   Ext: no calf tenderness. ROM of all ext. intact. distal pulses intact.  Neuro: AAO x3. no focal weakness. no speech disorder. CNs intact. sensory intact b/L.   Skin: no rash noted, warm and dry.   Psych : normal affect, no si/hi.

## 2020-08-29 NOTE — CONSULT NOTE ADULT - ATTENDING COMMENTS
I have personally seen, examined, and participated in the care of this patient. I have reviewed all pertinent clinical information, including history, physical exam, plan, and the PA/NP's note and agree except as noted below.    50 year old male with DM and active tobacco use presenting with hyperglycemia now improved.    ECG demonstrated new inferior TWI which resolved on follow up ECG. He has had intermittent chest pain over the past 2 weeks. His last ischemic work up was about 2 years ago with calcium score of 0 and dobutamine stress echo. Despite calcium score of 0, he does have DM and active tobacco use in addition to now ECG evidence of possible ischemia and chest pain. Given these new findings, we discussed the role of cardiac catheterization to definitively rule out ischemic disease. He was amenable to proceeding.    Plan:  - cardiac cath Monday 8/31  - monitor on telemetry  - Continue ASA, BB, Statin  - DM management per primary team    Skyler Stoner MD  Clinical Cardiac Electrophysiology

## 2020-08-29 NOTE — CONSULT NOTE ADULT - ASSESSMENT
50 year old male with type 2 DM, Hyperlipidemia admitted with uncontrolled DM and mild DKA on admission found to have abnormal EKG.  His glycemic control is improving with subcutaneous insulin and hydration.     1.  Type 2 DM-  Continue current dose of Lantus.  Will add 8 units of Humalog with meals and continue sliding scale for correction of hyperglycemia.  I would recommend discharge on basal bolus insulin regimen.  Metformin can likely be resumed on discharge, providing normalization of renal function.  2.  Abnormal EKG-  cardiology following.  Cardiac enzymes normal thus far.  Will need further ischemic work up.    3.  Hyperlipidemia-  continue simvastatin  4.  JUN-  was likely prerenal in etiology from volume depletion.   Improved after IVFs.  Monitor renal function.

## 2020-08-29 NOTE — H&P ADULT - NSICDXFAMILYHX_GEN_ALL_CORE_FT
FAMILY HISTORY:  Family history of diabetes mellitus, mother  Family history of ischemic heart disease

## 2020-08-29 NOTE — PROGRESS NOTE ADULT - ASSESSMENT
49 y/o male with h/o DM came to the ER as his glucometer broke and for past 2 weeks he had the feeling his BG was running high as he vomited on and off , some liquid in vomitus , no blood, no abd. pain. no diarrhea. no fever. felt dizzy on the day of ER visit, has h/o blurry vision, no acute vision changes, no syncopy or near syncopy, no cp, no sob, no focal motor weakness, no speech/ swallow trouble. pt. felt dehydrated, upon arrival pt's BG was 710. ph 7.36. as per pt. he was using his insulin Basaglar, and metformin admitted for uncontrolled DM and Acute renal failure due to dehydration    - DM TYPE 2 long term insulin use with hyperglycemia, will continue with lantus and humalog scale, diabetic education done and was counselled about the importance of  diet and medication compliance, his Hb a1c is >12, will get endocrine consult.      - JUN likely dehydration, uncontrolled DM and metformin use contributing, avoid nephrotoxic meds: He came in with creatinine of 2.5, has been getting IV fluids, now creatinine is 1.24, will continue with IV fluids and will monitor BMP.      Abnormal ekg , pt's inferior lead with t wave inversions, not seen in ekg from march 2019. has risk factor uncontrolled DM, pt. with no cp, dizziness reported, ct head to be followed, will do serial trop, echo ordered, cardio consulted, trops negative so for, he has no chest pain.     - Hyponatremia, very likely pseudohyponatremia: with correction of glucose level is now normal.     DVT prophylaxis: Heparin sc

## 2020-08-29 NOTE — H&P ADULT - ASSESSMENT
pt. is admitted for :    - DM TYPE 2 long term insulin use with hyperglycemia, will keep on lantus and humalog scale, diabetic education. compliance ?     - JUN likely dehydration, uncontrolled DM and metformin use contributing, avoid nephrotoxic meds. follow repeat labs, consider nephrology consult if Cr not improving with hydration.     - Abnormal ekg , pt's inferior lead with t wave inversions, not seen in ekg from march 2019. pt. with no cp, dizziness reported, ct head to be followed, will do serial trop, echo, cardio consult [facility].     - Hyponatremia, very likely pseudohyponatremia due to elevated BG. follow repeat labs , will likely improve with improve BG. pt. is admitted for :    - DM TYPE 2 long term insulin use with hyperglycemia, will keep on lantus and humalog scale, diabetic education. compliance ?     - JUN likely dehydration, uncontrolled DM and metformin use contributing, avoid nephrotoxic meds. follow repeat labs, consider nephrology consult if Cr not improving with hydration.     - Abnormal ekg , pt's inferior lead with t wave inversions, not seen in ekg from march 2019. has risk factor uncontrolled DM, pt. with no cp, dizziness reported, ct head to be followed, will do serial trop, echo, cardio consult [facility].     - Hyponatremia, very likely pseudohyponatremia due to elevated BG. follow repeat labs , will likely improve with improve BG.

## 2020-08-29 NOTE — CONSULT NOTE ADULT - SUBJECTIVE AND OBJECTIVE BOX
HPI:  50 year old male with type 2 DM, HTN and hyperlipidemia who presented to ED on 8/28 with lightheadedness and vomiting.  Over the past 2 weeks he was unable to test his BG at home since his glucometer borke, but he claims to have been compliant with all medications.  He reported 2 weeks of worsening polyuria and polydipsia.  On arrival to ED he was found to have severe hyperglycemia () with possible small degree of DKA (HCO3 19, anion gap 19 with trace urinary ketones).  He also had JUN with creatinine of 2.5.  He was given IVFs and subcutaneous insulin with resolution of his anion gap.  EKG showed t wave inversions.  Patient denies any active CP or dyspnea and cardiology is following.      Patient was diagnosed with type 2 DM about 10- 15 years ago.  His diabetes is severely uncontrolled with A1c of 12.4% here.  He denies any associated microvascular complications.  His control improved with insulin use and he is prescribed basaglar 20 units qhs and metformin 1000 mg daily at home.  He is followed at a clinic, but has never been under care of endocrinologist before this.      PAST MEDICAL & SURGICAL HISTORY:  Hyperlipidemia  Diabetes  No significant past surgical history    Allergies  No Known Allergies    MEDICATIONS  (STANDING):  aspirin enteric coated 81 milliGRAM(s) Oral daily  dextrose 5%. 1000 milliLiter(s) (50 mL/Hr) IV Continuous <Continuous>  dextrose 50% Injectable 12.5 Gram(s) IV Push once  dextrose 50% Injectable 25 Gram(s) IV Push once  dextrose 50% Injectable 25 Gram(s) IV Push once  heparin   Injectable 5000 Unit(s) SubCutaneous every 8 hours  insulin glargine Injectable (LANTUS) 30 Unit(s) SubCutaneous at bedtime  insulin lispro (HumaLOG) corrective regimen sliding scale   SubCutaneous three times a day before meals  insulin lispro (HumaLOG) corrective regimen sliding scale   SubCutaneous at bedtime  metoprolol tartrate 12.5 milliGRAM(s) Oral two times a day  simvastatin 40 milliGRAM(s) Oral at bedtime  sodium chloride 0.9%. 1000 milliLiter(s) (150 mL/Hr) IV Continuous <Continuous>    SH:  works in construction, + smoker    FH:  mother and father had DM    ROS:  as per HPI, otherwise 10 point ROS negative    Vital Signs Last 24 Hrs  T(C): 36.7 (29 Aug 2020 11:17), Max: 37.3 (28 Aug 2020 20:10)  T(F): 98.1 (29 Aug 2020 11:17), Max: 99.1 (28 Aug 2020 20:10)  HR: 63 (29 Aug 2020 11:17) (63 - 100)  BP: 117/71 (29 Aug 2020 11:17) (94/59 - 130/69)  BP(mean): 80 (29 Aug 2020 05:59) (71 - 80)  RR: 18 (29 Aug 2020 11:17) (18 - 18)  SpO2: 99% (29 Aug 2020 11:17) (96% - 99%)    PE:  Gen: AAO, NAD  HEENT:  sclera anicteric, EOMI,  MMM  Neck:  no thyromegaly, no LAD  CV:  nl S1 + S2, RRR, no m/r/g  Resp:  nl respiratory effort, CTA b/l  GI/ Abd: soft, NT/ ND, BS +  Neuro:  No tremor, sensation intact to light touch on b/l feet  MS:  no c/c/e, nl muscle tone  Skin:  no foot ulcers, Callus on feet b/l, onychomycosis, no acanthosis  Psych:  affect appropriate    LABS:  A1C with Estimated Average Glucose Result: 12.4 % (08.28.20 @ 20:32)    08-29    138  |  103  |  14.0  ----------------------------<  201<H>  3.9   |  24.0  |  1.24    Ca    8.9      29 Aug 2020 07:57  Phos  5.2     08-28  Mg     1.9     08-28    TPro  8.2  /  Alb  4.6  /  TBili  0.5  /  DBili  x   /  AST  19  /  ALT  22  /  AlkPhos  114  08-28                          14.4   6.77  )-----------( 215      ( 28 Aug 2020 20:32 )             41.1     CAPILLARY BLOOD GLUCOSE  POCT Blood Glucose.: 248 mg/dL (29 Aug 2020 12:09)  POCT Blood Glucose.: 210 mg/dL (29 Aug 2020 08:34)  POCT Blood Glucose.: 169 mg/dL (29 Aug 2020 05:53)  POCT Blood Glucose.: 96 mg/dL (29 Aug 2020 04:41)  POCT Blood Glucose.: 183 mg/dL (29 Aug 2020 02:29)  POCT Blood Glucose.: >530 mg/dL (28 Aug 2020 22:09)  POCT Blood Glucose.: >530 mg/dL (28 Aug 2020 20:15)  POCT Blood Glucose.: >530 mg/dL (28 Aug 2020 20:14)

## 2020-08-30 LAB
ALBUMIN SERPL ELPH-MCNC: 3.7 G/DL — SIGNIFICANT CHANGE UP (ref 3.3–5.2)
ALP SERPL-CCNC: 96 U/L — SIGNIFICANT CHANGE UP (ref 40–120)
ALT FLD-CCNC: 18 U/L — SIGNIFICANT CHANGE UP
ANION GAP SERPL CALC-SCNC: 13 MMOL/L — SIGNIFICANT CHANGE UP (ref 5–17)
AST SERPL-CCNC: 17 U/L — SIGNIFICANT CHANGE UP
BILIRUB SERPL-MCNC: 0.3 MG/DL — LOW (ref 0.4–2)
BUN SERPL-MCNC: 12 MG/DL — SIGNIFICANT CHANGE UP (ref 8–20)
CALCIUM SERPL-MCNC: 8.9 MG/DL — SIGNIFICANT CHANGE UP (ref 8.6–10.2)
CHLORIDE SERPL-SCNC: 101 MMOL/L — SIGNIFICANT CHANGE UP (ref 98–107)
CHOLEST SERPL-MCNC: 81 MG/DL — LOW (ref 110–199)
CO2 SERPL-SCNC: 23 MMOL/L — SIGNIFICANT CHANGE UP (ref 22–29)
CREAT SERPL-MCNC: 1.05 MG/DL — SIGNIFICANT CHANGE UP (ref 0.5–1.3)
CULTURE RESULTS: SIGNIFICANT CHANGE UP
GLUCOSE BLDC GLUCOMTR-MCNC: 175 MG/DL — HIGH (ref 70–99)
GLUCOSE BLDC GLUCOMTR-MCNC: 188 MG/DL — HIGH (ref 70–99)
GLUCOSE BLDC GLUCOMTR-MCNC: 283 MG/DL — HIGH (ref 70–99)
GLUCOSE BLDC GLUCOMTR-MCNC: 300 MG/DL — HIGH (ref 70–99)
GLUCOSE BLDC GLUCOMTR-MCNC: 334 MG/DL — HIGH (ref 70–99)
GLUCOSE SERPL-MCNC: 353 MG/DL — HIGH (ref 70–99)
HCT VFR BLD CALC: 38.5 % — LOW (ref 39–50)
HDLC SERPL-MCNC: 36 MG/DL — LOW
HGB BLD-MCNC: 12.8 G/DL — LOW (ref 13–17)
INR BLD: 0.97 RATIO — SIGNIFICANT CHANGE UP (ref 0.88–1.16)
LIPID PNL WITH DIRECT LDL SERPL: 20 MG/DL — SIGNIFICANT CHANGE UP
MCHC RBC-ENTMCNC: 29.8 PG — SIGNIFICANT CHANGE UP (ref 27–34)
MCHC RBC-ENTMCNC: 33.2 GM/DL — SIGNIFICANT CHANGE UP (ref 32–36)
MCV RBC AUTO: 89.7 FL — SIGNIFICANT CHANGE UP (ref 80–100)
PLATELET # BLD AUTO: 178 K/UL — SIGNIFICANT CHANGE UP (ref 150–400)
POTASSIUM SERPL-MCNC: 4.2 MMOL/L — SIGNIFICANT CHANGE UP (ref 3.5–5.3)
POTASSIUM SERPL-SCNC: 4.2 MMOL/L — SIGNIFICANT CHANGE UP (ref 3.5–5.3)
PROT SERPL-MCNC: 6.9 G/DL — SIGNIFICANT CHANGE UP (ref 6.6–8.7)
PROTHROM AB SERPL-ACNC: 11.3 SEC — SIGNIFICANT CHANGE UP (ref 10.6–13.6)
RBC # BLD: 4.29 M/UL — SIGNIFICANT CHANGE UP (ref 4.2–5.8)
RBC # FLD: 12.3 % — SIGNIFICANT CHANGE UP (ref 10.3–14.5)
SODIUM SERPL-SCNC: 136 MMOL/L — SIGNIFICANT CHANGE UP (ref 135–145)
SPECIMEN SOURCE: SIGNIFICANT CHANGE UP
TOTAL CHOLESTEROL/HDL RATIO MEASUREMENT: 2 RATIO — LOW (ref 3.4–9.6)
TRIGL SERPL-MCNC: 127 MG/DL — SIGNIFICANT CHANGE UP (ref 10–200)
TROPONIN T SERPL-MCNC: <0.01 NG/ML — SIGNIFICANT CHANGE UP (ref 0–0.06)
TSH SERPL-MCNC: 1.16 UIU/ML — SIGNIFICANT CHANGE UP (ref 0.27–4.2)
WBC # BLD: 3.79 K/UL — LOW (ref 3.8–10.5)
WBC # FLD AUTO: 3.79 K/UL — LOW (ref 3.8–10.5)

## 2020-08-30 PROCEDURE — 99232 SBSQ HOSP IP/OBS MODERATE 35: CPT

## 2020-08-30 PROCEDURE — 99233 SBSQ HOSP IP/OBS HIGH 50: CPT

## 2020-08-30 RX ORDER — INSULIN GLARGINE 100 [IU]/ML
15 INJECTION, SOLUTION SUBCUTANEOUS AT BEDTIME
Refills: 0 | Status: DISCONTINUED | OUTPATIENT
Start: 2020-08-30 | End: 2020-08-31

## 2020-08-30 RX ADMIN — HEPARIN SODIUM 5000 UNIT(S): 5000 INJECTION INTRAVENOUS; SUBCUTANEOUS at 13:19

## 2020-08-30 RX ADMIN — INSULIN GLARGINE 15 UNIT(S): 100 INJECTION, SOLUTION SUBCUTANEOUS at 21:28

## 2020-08-30 RX ADMIN — Medication 6: at 08:17

## 2020-08-30 RX ADMIN — HEPARIN SODIUM 5000 UNIT(S): 5000 INJECTION INTRAVENOUS; SUBCUTANEOUS at 05:39

## 2020-08-30 RX ADMIN — Medication 8 UNIT(S): at 08:17

## 2020-08-30 RX ADMIN — Medication 12.5 MILLIGRAM(S): at 17:11

## 2020-08-30 RX ADMIN — Medication 6: at 17:12

## 2020-08-30 RX ADMIN — HEPARIN SODIUM 5000 UNIT(S): 5000 INJECTION INTRAVENOUS; SUBCUTANEOUS at 21:22

## 2020-08-30 RX ADMIN — Medication 8 UNIT(S): at 17:11

## 2020-08-30 RX ADMIN — Medication 81 MILLIGRAM(S): at 12:01

## 2020-08-30 RX ADMIN — Medication 8 UNIT(S): at 12:49

## 2020-08-30 RX ADMIN — Medication 2: at 12:49

## 2020-08-30 NOTE — PROGRESS NOTE ADULT - ASSESSMENT
49 y/o male with h/o DM came to the ER as his glucometer broke and for past 2 weeks he had the feeling his BG was running high as he vomited on and off , some liquid in vomitus , no blood, no abd. pain. no diarrhea. no fever. felt dizzy on the day of ER visit, has h/o blurry vision, no acute vision changes, no syncopy or near syncopy, no cp, no sob, no focal motor weakness, no speech/ swallow trouble. pt. felt dehydrated, upon arrival pt's BG was 710. ph 7.36. as per pt. he was using his insulin Basaglar, and metformin admitted for uncontrolled DM and Acute renal failure due to dehydration    - DM TYPE 2 long term insulin use with hyperglycemia, will continue with lantus and humalog scale, diabetic education done and was counselled about the importance of  diet and medication compliance, his Hb a1c is >12, will get endocrine consult appreciated, will continue with current regimen, will give Lntus 15 units tonight as he is going to be NPO midnight.      - JUN likely dehydration, uncontrolled DM and metformin use contributing, avoid nephrotoxic meds: He came in with creatinine of 2.5, has been getting IV fluids, now creatinine is 1.05, will monitor BMP.      Abnormal ekg , pt's inferior lead with t wave inversions, not seen in ekg from march 2019. has risk factor uncontrolled DM, pt. with no cp, dizziness reported, ct head to be followed, will do serial trop, echo ordered, cardio consulted appreciated, trops negative, likely cath tomorrow, patient's LDL is 20, will repeat Lipid panel again, will hold Simvastatin if that is the case.      - Hyponatremia, very likely pseudohyponatremia: with correction of glucose level is now normal.     DVT prophylaxis: Heparin sc      Likely d/c tomorrow if Cath is ok and Cardiology clears him.

## 2020-08-30 NOTE — PROGRESS NOTE ADULT - ASSESSMENT
49 y/o M with pmh DM, current smoker, presents to Ed with c/o dizziness, n/v. States Glucometer broke past 2 weeks he had the feeling his BG was running high. In ED glucose 710. Noted to have abnl EKG, new changes since 2019. Pt with normal echo and stress 2018, never followed up in office.    Abnormal EKG  - TTE results pending   - repeat EKG improved- SR, NSST-T abnl  - plan for [facility] tomorrow, NPO after MN    Diabetes- uncontrolled  - A1C 12.4   - tx as per hospital team   - endocrine following     Smoking  - discussed smoking cessation with pt.   - stated "i've cut down" no plans on quitting at this time.

## 2020-08-31 ENCOUNTER — TRANSCRIPTION ENCOUNTER (OUTPATIENT)
Age: 50
End: 2020-08-31

## 2020-08-31 VITALS
RESPIRATION RATE: 16 BRPM | OXYGEN SATURATION: 100 % | DIASTOLIC BLOOD PRESSURE: 77 MMHG | SYSTOLIC BLOOD PRESSURE: 113 MMHG | HEART RATE: 58 BPM

## 2020-08-31 LAB
GLUCOSE BLDC GLUCOMTR-MCNC: 228 MG/DL — HIGH (ref 70–99)
GLUCOSE BLDC GLUCOMTR-MCNC: 236 MG/DL — HIGH (ref 70–99)
GLUCOSE BLDC GLUCOMTR-MCNC: 292 MG/DL — HIGH (ref 70–99)
GLUCOSE BLDC GLUCOMTR-MCNC: 306 MG/DL — HIGH (ref 70–99)
GLUCOSE BLDC GLUCOMTR-MCNC: 308 MG/DL — HIGH (ref 70–99)
GLUCOSE BLDC GLUCOMTR-MCNC: 349 MG/DL — HIGH (ref 70–99)
GLUCOSE BLDC GLUCOMTR-MCNC: 367 MG/DL — HIGH (ref 70–99)
GLUCOSE BLDC GLUCOMTR-MCNC: 406 MG/DL — HIGH (ref 70–99)

## 2020-08-31 PROCEDURE — 82330 ASSAY OF CALCIUM: CPT

## 2020-08-31 PROCEDURE — 82803 BLOOD GASES ANY COMBINATION: CPT

## 2020-08-31 PROCEDURE — 80061 LIPID PANEL: CPT

## 2020-08-31 PROCEDURE — 80053 COMPREHEN METABOLIC PANEL: CPT

## 2020-08-31 PROCEDURE — 80307 DRUG TEST PRSMV CHEM ANLYZR: CPT

## 2020-08-31 PROCEDURE — 83036 HEMOGLOBIN GLYCOSYLATED A1C: CPT

## 2020-08-31 PROCEDURE — 85027 COMPLETE CBC AUTOMATED: CPT

## 2020-08-31 PROCEDURE — 93005 ELECTROCARDIOGRAM TRACING: CPT

## 2020-08-31 PROCEDURE — 85610 PROTHROMBIN TIME: CPT

## 2020-08-31 PROCEDURE — 93458 L HRT ARTERY/VENTRICLE ANGIO: CPT | Mod: 26

## 2020-08-31 PROCEDURE — 99152 MOD SED SAME PHYS/QHP 5/>YRS: CPT

## 2020-08-31 PROCEDURE — 80048 BASIC METABOLIC PNL TOTAL CA: CPT

## 2020-08-31 PROCEDURE — 36415 COLL VENOUS BLD VENIPUNCTURE: CPT

## 2020-08-31 PROCEDURE — 85018 HEMOGLOBIN: CPT

## 2020-08-31 PROCEDURE — 99239 HOSP IP/OBS DSCHRG MGMT >30: CPT

## 2020-08-31 PROCEDURE — 93880 EXTRACRANIAL BILAT STUDY: CPT

## 2020-08-31 PROCEDURE — U0003: CPT

## 2020-08-31 PROCEDURE — 83735 ASSAY OF MAGNESIUM: CPT

## 2020-08-31 PROCEDURE — 82009 KETONE BODYS QUAL: CPT

## 2020-08-31 PROCEDURE — 84295 ASSAY OF SERUM SODIUM: CPT

## 2020-08-31 PROCEDURE — 96360 HYDRATION IV INFUSION INIT: CPT

## 2020-08-31 PROCEDURE — 87086 URINE CULTURE/COLONY COUNT: CPT

## 2020-08-31 PROCEDURE — 84443 ASSAY THYROID STIM HORMONE: CPT

## 2020-08-31 PROCEDURE — 99285 EMERGENCY DEPT VISIT HI MDM: CPT | Mod: 25

## 2020-08-31 PROCEDURE — 96361 HYDRATE IV INFUSION ADD-ON: CPT

## 2020-08-31 PROCEDURE — 83605 ASSAY OF LACTIC ACID: CPT

## 2020-08-31 PROCEDURE — 70450 CT HEAD/BRAIN W/O DYE: CPT

## 2020-08-31 PROCEDURE — C1894: CPT

## 2020-08-31 PROCEDURE — 82962 GLUCOSE BLOOD TEST: CPT

## 2020-08-31 PROCEDURE — 84100 ASSAY OF PHOSPHORUS: CPT

## 2020-08-31 PROCEDURE — 99232 SBSQ HOSP IP/OBS MODERATE 35: CPT

## 2020-08-31 PROCEDURE — 84132 ASSAY OF SERUM POTASSIUM: CPT

## 2020-08-31 PROCEDURE — 71045 X-RAY EXAM CHEST 1 VIEW: CPT

## 2020-08-31 PROCEDURE — C1887: CPT

## 2020-08-31 PROCEDURE — 86769 SARS-COV-2 COVID-19 ANTIBODY: CPT

## 2020-08-31 PROCEDURE — 85014 HEMATOCRIT: CPT

## 2020-08-31 PROCEDURE — 93458 L HRT ARTERY/VENTRICLE ANGIO: CPT

## 2020-08-31 PROCEDURE — 82435 ASSAY OF BLOOD CHLORIDE: CPT

## 2020-08-31 PROCEDURE — 83930 ASSAY OF BLOOD OSMOLALITY: CPT

## 2020-08-31 PROCEDURE — 84484 ASSAY OF TROPONIN QUANT: CPT

## 2020-08-31 PROCEDURE — C1769: CPT

## 2020-08-31 PROCEDURE — 82947 ASSAY GLUCOSE BLOOD QUANT: CPT

## 2020-08-31 PROCEDURE — 81003 URINALYSIS AUTO W/O SCOPE: CPT

## 2020-08-31 PROCEDURE — 93306 TTE W/DOPPLER COMPLETE: CPT

## 2020-08-31 RX ORDER — ISOPROPYL ALCOHOL, BENZOCAINE .7; .06 ML/ML; ML/ML
1 SWAB TOPICAL
Qty: 100 | Refills: 1
Start: 2020-08-31 | End: 2020-10-19

## 2020-08-31 RX ORDER — ASPIRIN/CALCIUM CARB/MAGNESIUM 324 MG
1 TABLET ORAL
Qty: 0 | Refills: 0 | DISCHARGE
Start: 2020-08-31

## 2020-08-31 RX ORDER — METFORMIN HYDROCHLORIDE 850 MG/1
0 TABLET ORAL
Qty: 0 | Refills: 0 | DISCHARGE

## 2020-08-31 RX ORDER — INSULIN LISPRO 100/ML
3 VIAL (ML) SUBCUTANEOUS ONCE
Refills: 0 | Status: COMPLETED | OUTPATIENT
Start: 2020-08-31 | End: 2020-08-31

## 2020-08-31 RX ORDER — SIMVASTATIN 20 MG/1
1 TABLET, FILM COATED ORAL
Qty: 0 | Refills: 0 | DISCHARGE

## 2020-08-31 RX ORDER — INSULIN GLARGINE 100 [IU]/ML
30 INJECTION, SOLUTION SUBCUTANEOUS
Qty: 2 | Refills: 0
Start: 2020-08-31 | End: 2020-09-29

## 2020-08-31 RX ORDER — SODIUM CHLORIDE 9 MG/ML
500 INJECTION INTRAMUSCULAR; INTRAVENOUS; SUBCUTANEOUS ONCE
Refills: 0 | Status: COMPLETED | OUTPATIENT
Start: 2020-08-31 | End: 2020-08-31

## 2020-08-31 RX ORDER — INSULIN LISPRO 100/ML
10 VIAL (ML) SUBCUTANEOUS
Qty: 2 | Refills: 0
Start: 2020-08-31 | End: 2020-09-29

## 2020-08-31 RX ORDER — INSULIN GLARGINE 100 [IU]/ML
0 INJECTION, SOLUTION SUBCUTANEOUS
Qty: 0 | Refills: 0 | DISCHARGE

## 2020-08-31 RX ADMIN — Medication 12.5 MILLIGRAM(S): at 05:11

## 2020-08-31 RX ADMIN — Medication 8 UNIT(S): at 10:59

## 2020-08-31 RX ADMIN — Medication 81 MILLIGRAM(S): at 06:39

## 2020-08-31 RX ADMIN — Medication 4: at 10:58

## 2020-08-31 RX ADMIN — Medication 8: at 07:24

## 2020-08-31 RX ADMIN — Medication 3 UNIT(S): at 01:25

## 2020-08-31 RX ADMIN — HEPARIN SODIUM 5000 UNIT(S): 5000 INJECTION INTRAVENOUS; SUBCUTANEOUS at 05:11

## 2020-08-31 RX ADMIN — SODIUM CHLORIDE 500 MILLILITER(S): 9 INJECTION INTRAMUSCULAR; INTRAVENOUS; SUBCUTANEOUS at 09:21

## 2020-08-31 NOTE — PROGRESS NOTE ADULT - SUBJECTIVE AND OBJECTIVE BOX
Interventional Cardiology NP Precath Note    Pt presents for cardiac cath due to abnormal EKG   No chest pain or SOB         Mallampati 2  ASA 2  BRA 0.8  GFR 94     ALL: NKDA, NKFA. Denies shellfish/Contrast dye allergy.  PAST MEDICAL & SURGICAL HISTORY:  Blurry vision  Diabetes  No significant past surgical history      MEDS:   aspirin enteric coated 81 milliGRAM(s) Oral daily  dextrose 40% Gel 15 Gram(s) Oral once PRN  dextrose 5%. 1000 milliLiter(s) IV Continuous <Continuous>  glucagon  Injectable 1 milliGRAM(s) IntraMuscular once PRN  heparin   Injectable 5000 Unit(s) SubCutaneous every 8 hours  insulin glargine Injectable (LANTUS) 15 Unit(s) SubCutaneous at bedtime  insulin lispro (HumaLOG) corrective regimen sliding scale   SubCutaneous three times a day before meals  insulin lispro (HumaLOG) corrective regimen sliding scale   SubCutaneous at bedtime  insulin lispro Injectable (HumaLOG) 8 Unit(s) SubCutaneous three times a day with meals  metoprolol tartrate 12.5 milliGRAM(s) Oral two times a day    T(C): 36.9 (08-31-20 @ 07:01), Max: 36.9 (08-31-20 @ 07:01)  HR: 49 (08-31-20 @ 07:01) (49 - 73)  BP: 120/66 (08-31-20 @ 07:01) (120/66 - 125/69)  RR: 18 (08-31-20 @ 07:01) (18 - 18)  SpO2: 98% (08-31-20 @ 07:01) (98% - 100%)    Neuro O: A & O x 3, no focal neurologic deficits  HEENT: NCAT, EOMI, PERRLA  NECK: No JVD, No carotid bruits B/L, +2 Carotid pulses B/L  PULM:  CTA B/L No W/R/R  CARD: RRR, +S1, +S2, No M/R/G  ABD: ND, +BS, NT, no masses  EXT: Warm, pedal edema yes/no, pitting/non-pitting                                  12.8   3.79  )-----------( 178      ( 30 Aug 2020 06:18 )             38.5     08-30    136  |  101  |  12.0  ----------------------------<  353<H>  4.2   |  23.0  |  1.05    Ca    8.9      30 Aug 2020 06:18    TPro  6.9  /  Alb  3.7  /  TBili  0.3<L>  /  DBili  x   /  AST  17  /  ALT  18  /  AlkPhos  96  08-30    CARDIAC MARKERS ( 30 Aug 2020 06:18 )  x     / <0.01 ng/mL / x     / x     / x      CARDIAC MARKERS ( 29 Aug 2020 13:04 )  x     / <0.01 ng/mL / x     / x     / x
[place] [facility]                                                               Office: [address] [address]                                                              Telephone: [phone]. Fax:[phone]                                                                             PROGRESS NOTE  Reason for follow up: Abnormal EKG  Overnight: No new events.   Update: CM- SB, 40s. denies chest pain, shortness of breath, dizziness, syncope, near syncope. States "didn't sleep at all last night." Plan for [facility] tomorrow, evaluate for ischemia     	  Vitals:  T(C): 36.6 (08-30-20 @ 07:40), Max: 36.6 (08-29-20 @ 17:22)  HR: 63 (08-30-20 @ 07:40) (63 - 69)  BP: 118/69 (08-30-20 @ 07:40) (117/69 - 132/74)  RR: 18 (08-30-20 @ 07:40) (18 - 20)  SpO2: 100% (08-30-20 @ 07:40) (98% - 100%)      I&O's Summary    29 Aug 2020 07:01  -  30 Aug 2020 07:00  --------------------------------------------------------  IN: 540 mL / OUT: 0 mL / NET: 540 mL      Weight (kg): 120.2 (08-28 @ 20:10)      PHYSICAL EXAM:  Appearance: Comfortable. No acute distress  HEENT:  Atraumatic. Normocephalic.  Normal oral mucosa, PERRL  Neurologic: A & O x 3, no focal deficits. EOMI.  Cardiovascular: Normal S1 S2, No murmur, rubs/gallops. No JVD, No edema  Respiratory: Lungs clear to auscultation  Gastrointestinal:  Soft, Non-tender, + BS  Lower Extremities: No edema  Psychiatry: Patient is calm. No agitation.   Skin: No rashes/ ecchymoses/cyanosis/ulcers visualized on the face, hands or feet.      CURRENT MEDICATIONS:  metoprolol tartrate 12.5 milliGRAM(s) Oral two times a day  dextrose 50% Injectable  insulin glargine Injectable (LANTUS)  insulin lispro (HumaLOG) corrective regimen sliding scale  insulin lispro Injectable (HumaLOG)  aspirin enteric coated  dextrose 5%.  heparin   Injectable      DIAGNOSTIC TESTING:  [ ] Echocardiogram:   results pending   	    LABS:	 	  CARDIAC MARKERS ( 30 Aug 2020 06:18 )  x     / <0.01 ng/mL / x     / x     / x      p-BNP 30 Aug 2020 06:18: x    , CARDIAC MARKERS ( 29 Aug 2020 13:04 )  x     / <0.01 ng/mL / x     / x     / x      p-BNP 29 Aug 2020 13:04: x    , CARDIAC MARKERS ( 29 Aug 2020 07:57 )  x     / <0.01 ng/mL / x     / x     / x      p-BNP 29 Aug 2020 07:57: x    , CARDIAC MARKERS ( 28 Aug 2020 20:32 )  x     / <0.01 ng/mL / x     / x     / x      p-BNP 28 Aug 2020 20:32: x                            12.8   3.79  )-----------( 178      ( 30 Aug 2020 06:18 )             38.5     08-30    136  |  101  |  12.0  ----------------------------<  353<H>  4.2   |  23.0  |  1.05    Ca    8.9      30 Aug 2020 06:18  Phos  5.2     08-28  Mg     1.9     08-28    TPro  6.9  /  Alb  3.7  /  TBili  0.3<L>  /  DBili  x   /  AST  17  /  ALT  18  /  AlkPhos  96  08-30    Lipid Profile: Date: 08-30 @ 06:18  Total cholesterol 81; Direct LDL: 20; HDL: 36; Triglycerides:127    TSH: Thyroid Stimulating Hormone, Serum: 1.16 uIU/mL    TELEMETRY: SB 42 bpm, SR
YUMIKO GRIFFITH    [id number]    50y      Male    Patient is a 50y old  Male who presents with a chief complaint of hyperglycemia (30 Aug 2020 12:29)      INTERVAL HPI/OVERNIGHT EVENTS:    Patient is doing ok, he has no complains, he feels improvement of his symptoms, he denies chest pain, nausea, vomiting.     REVIEW OF SYSTEMS:    CONSTITUTIONAL: No fever, has fatigue  RESPIRATORY: No cough, No shortness of breath  CARDIOVASCULAR: No chest pain, palpitations  GASTROINTESTINAL: No abdominal, No nausea, vomiting  NEUROLOGICAL: No headaches,  loss of strength.  MISCELLANEOUS: No joint swelling or pain     Vital Signs Last 24 Hrs  T(C): 36.6 (30 Aug 2020 07:40), Max: 36.6 (29 Aug 2020 17:22)  T(F): 97.8 (30 Aug 2020 07:40), Max: 97.9 (29 Aug 2020 17:22)  HR: 63 (30 Aug 2020 07:40) (63 - 69)  BP: 118/69 (30 Aug 2020 07:40) (117/69 - 132/74)  RR: 18 (30 Aug 2020 07:40) (18 - 20)  SpO2: 100% (30 Aug 2020 07:40) (98% - 100%)    PHYSICAL EXAM:    GENERAL: Middle age male looking comfortable    HEENT: PERRL, +EOMI  NECK: soft, Supple, No JVD,   CHEST/LUNG: Clear to auscultate bilaterally; No wheezing  HEART: S1S2+, Regular rate and rhythm; No murmurs  ABDOMEN: Soft, Nontender, Nondistended; Bowel sounds present  EXTREMITIES:  1+ Peripheral Pulses, No edema  SKIN: No rashes or lesions  NEURO: AAOX3, no focal deficits, no motor r sensory loss  PSYCH: normal mood        LABS:                        12.8   3.79  )-----------( 178      ( 30 Aug 2020 06:18 )             38.5     08-30    136  |  101  |  12.0  ----------------------------<  353<H>  4.2   |  23.0  |  1.05    Ca    8.9      30 Aug 2020 06:18  Phos  5.2     08-28  Mg     1.9     08-28    TPro  6.9  /  Alb  3.7  /  TBili  0.3<L>  /  DBili  x   /  AST  17  /  ALT  18  /  AlkPhos  96  08-    PT/INR - ( 30 Aug 2020 06:18 )   PT: 11.3 sec;   INR: 0.97 ratio           Urinalysis Basic - ( 28 Aug 2020 21:39 )    Color: Yellow / Appearance: Clear / S.010 / pH: x  Gluc: x / Ketone: Trace  / Bili: Negative / Urobili: Negative mg/dL   Blood: x / Protein: Negative mg/dL / Nitrite: Negative   Leuk Esterase: Negative / RBC: x / WBC x   Sq Epi: x / Non Sq Epi: x / Bacteria: x          I&O's Summary    29 Aug 2020 07:01  -  30 Aug 2020 07:00  --------------------------------------------------------  IN: 540 mL / OUT: 0 mL / NET: 540 mL        MEDICATIONS  (STANDING):  aspirin enteric coated 81 milliGRAM(s) Oral daily  dextrose 5%. 1000 milliLiter(s) (50 mL/Hr) IV Continuous <Continuous>  dextrose 50% Injectable 12.5 Gram(s) IV Push once  dextrose 50% Injectable 25 Gram(s) IV Push once  dextrose 50% Injectable 25 Gram(s) IV Push once  heparin   Injectable 5000 Unit(s) SubCutaneous every 8 hours  insulin glargine Injectable (LANTUS) 30 Unit(s) SubCutaneous at bedtime  insulin lispro (HumaLOG) corrective regimen sliding scale   SubCutaneous three times a day before meals  insulin lispro (HumaLOG) corrective regimen sliding scale   SubCutaneous at bedtime  insulin lispro Injectable (HumaLOG) 8 Unit(s) SubCutaneous three times a day with meals  metoprolol tartrate 12.5 milliGRAM(s) Oral two times a day    MEDICATIONS  (PRN):  dextrose 40% Gel 15 Gram(s) Oral once PRN Blood Glucose LESS THAN 70 milliGRAM(s)/deciliter  glucagon  Injectable 1 milliGRAM(s) IntraMuscular once PRN Glucose LESS THAN 70 milligrams/deciliter
YUMIKO GRIFFITH    [id number]    50y      Male    Patient is a 50y old  Male who presents with a chief complaint of hyperglycemia (29 Aug 2020 08:30)      INTERVAL HPI/OVERNIGHT EVENTS:    Patient is doing ok, he has no complains, he feels improvement of his symptoms    REVIEW OF SYSTEMS:    CONSTITUTIONAL: No fever, has fatigue  RESPIRATORY: No cough, No shortness of breath  CARDIOVASCULAR: No chest pain, palpitations  GASTROINTESTINAL: No abdominal, No nausea, vomiting  NEUROLOGICAL: No headaches,  loss of strength.  MISCELLANEOUS: No joint swelling or pain       Vital Signs Last 24 Hrs  T(C): 36.7 (29 Aug 2020 11:17), Max: 37.3 (28 Aug 2020 20:10)  T(F): 98.1 (29 Aug 2020 11:17), Max: 99.1 (28 Aug 2020 20:10)  HR: 63 (29 Aug 2020 11:17) (63 - 100)  BP: 117/71 (29 Aug 2020 11:17) (94/59 - 130/69)  BP(mean): 80 (29 Aug 2020 05:59) (71 - 80)  RR: 18 (29 Aug 2020 11:17) (18 - 18)  SpO2: 99% (29 Aug 2020 11:17) (96% - 99%)    PHYSICAL EXAM:    GENERAL: Middle age male looking comfortable    HEENT: PERRL, +EOMI  NECK: soft, Supple, No JVD,   CHEST/LUNG: Clear to auscultate bilaterally; No wheezing  HEART: S1S2+, Regular rate and rhythm; No murmurs  ABDOMEN: Soft, Nontender, Nondistended; Bowel sounds present  EXTREMITIES:  1+ Peripheral Pulses, No edema  SKIN: No rashes or lesions  NEURO: AAOX3, no focal deficits, no motor r sensory loss  PSYCH: normal mood      LABS:                        14.4   6.77  )-----------( 215      ( 28 Aug 2020 20:32 )             41.1     08-29    138  |  103  |  14.0  ----------------------------<  201<H>  3.9   |  24.0  |  1.24    Ca    8.9      29 Aug 2020 07:57  Phos  5.2     08-28  Mg     1.9     08-28    TPro  8.2  /  Alb  4.6  /  TBili  0.5  /  DBili  x   /  AST  19  /  ALT  22  /  AlkPhos  114        Urinalysis Basic - ( 28 Aug 2020 21:39 )    Color: Yellow / Appearance: Clear / S.010 / pH: x  Gluc: x / Ketone: Trace  / Bili: Negative / Urobili: Negative mg/dL   Blood: x / Protein: Negative mg/dL / Nitrite: Negative   Leuk Esterase: Negative / RBC: x / WBC x   Sq Epi: x / Non Sq Epi: x / Bacteria: x          I&O's Summary      MEDICATIONS  (STANDING):  aspirin enteric coated 81 milliGRAM(s) Oral daily  dextrose 5%. 1000 milliLiter(s) (50 mL/Hr) IV Continuous <Continuous>  dextrose 50% Injectable 12.5 Gram(s) IV Push once  dextrose 50% Injectable 25 Gram(s) IV Push once  dextrose 50% Injectable 25 Gram(s) IV Push once  heparin   Injectable 5000 Unit(s) SubCutaneous every 8 hours  insulin glargine Injectable (LANTUS) 30 Unit(s) SubCutaneous at bedtime  insulin lispro (HumaLOG) corrective regimen sliding scale   SubCutaneous three times a day before meals  insulin lispro (HumaLOG) corrective regimen sliding scale   SubCutaneous at bedtime  metoprolol tartrate 12.5 milliGRAM(s) Oral two times a day  simvastatin 40 milliGRAM(s) Oral at bedtime  sodium chloride 0.9%. 1000 milliLiter(s) (150 mL/Hr) IV Continuous <Continuous>    MEDICATIONS  (PRN):  dextrose 40% Gel 15 Gram(s) Oral once PRN Blood Glucose LESS THAN 70 milliGRAM(s)/deciliter  glucagon  Injectable 1 milliGRAM(s) IntraMuscular once PRN Glucose LESS THAN 70 milligrams/deciliter
[place] [facility]                                                               Office: [address] [address]                                                           Telephone: [phone]. Fax:[phone]    51 y/o male with h/o DMsmoker came to the ER as his glucometer broke and for past 2 weeks he had the feeling his BG was running high as he vomited on and off , some liquid in vomitus , no blood, no abd. pain. no diarrhea. no fever. felt dizzy on the day of ER visit, has h/o blurry vision, no acute vision changes, no syncope or near syncope no cp, no sob, no focal motor weakness, no speech/ swallow trouble.Pt. felt dehydrated, upon arrival pt's BG was 710. ph 7.36. as per pt. he was using his insulin Basaglar, and metformin  Cardiology was consulted due to abnormal EKG with SB with T wave abnormality ,strong family history, smoker. He was referred    for cardiac cath                                                     s/p LHC revealing non obstructive CAD done via RRA     Patient feels well.  Denies chest pain, shortness of breath, dizziness or palpitations at this time      Right radial hemo band in place.  Procedure site CDI, no bleeding, no hematoma, able to move all digits with capillary refill < 3 seconds, fingers warm      Vital Signs Last 24 Hrs  T(C): 36.9 (31 Aug 2020 07:01), Max: 36.9 (31 Aug 2020 07:01)  T(F): 98.4 (31 Aug 2020 07:01), Max: 98.4 (31 Aug 2020 07:01)  HR: 49 (31 Aug 2020 07:01) (49 - 73)  BP: 120/66 (31 Aug 2020 07:01) (120/66 - 125/69)  RR: 18 (31 Aug 2020 07:01) (18 - 18)  SpO2: 98% (31 Aug 2020 07:01) (98% - 100%)    MEDICATIONS  (STANDING):  aspirin enteric coated 81 milliGRAM(s) Oral daily  heparin   Injectable 5000 Unit(s) SubCutaneous every 8 hours  insulin glargine Injectable (LANTUS) 15 Unit(s) SubCutaneous at bedtime  insulin lispro (HumaLOG) corrective regimen sliding scale   SubCutaneous three times a day before meals  insulin lispro (HumaLOG) corrective regimen sliding scale   SubCutaneous at bedtime  insulin lispro Injectable (HumaLOG) 8 Unit(s) SubCutaneous three times a day with meals  metoprolol tartrate 12.5 milliGRAM(s) Oral two times a day      : Cardiac Cath Lab - Adult (08.31.20   CORONARY VESSELS: The coronary circulation is right dominant.  LM:   --  LM: Normal. The vessel was normal sized, not calcified, and not  tortuous. Angiography showed no evidence of disease.  LAD:   -- LAD: Normal. The vessel was normal sized, not calcified, and not  tortuous. Angiography showed no evidence of disease.  CX:   --  Circumflex: Normal. The vessel was small sized (non-dominant).  Angiography showed no evidence of disease.  RI:   --  Ramus intermedius: Normal. The vessel was large sized, not  calcified, and not tortuous. Angiography showed no evidence of disease.  RCA:   --  RCA: Normal. The vessel was normal sized, not calcified, and not  tortuous. Angiography showed minor luminalirregularities with no flow  limiting lesions.  COMPLICATIONS: There were no complications.  DIAGNOSTIC IMPRESSIONS: The coronary anatomy is normal. Left ventricular function is normal. LVEF=60%            now s/p LHC revealing non obstructive CAD done via RRA  tolerated     Non- obstructive CAD  Continue ASA and metoprolol   Start Statin daily   -continue tele  -continue hospitalist service  -vital signs, labs, diet and activity per post procedure orders  - ambulate ad larissa post bedrest  - encourage PO fluids  -plan of care discussed with patient, family and MD   -post procedure and d/c instructions reviewed  -follow up with MD in 1-2 weeks  -Discussed therapeutic lifestyle changes to reduce risk factors such as following a cardiac diet, weight loss, maintaining a healthy weight, exercise, smoking cessation, medication compliance, and regular follow-up  with MD to know your numbers (BP, cholesterol, weight, and glucose)      DKA -  DM - HgAIC 12   continue medical manage ment as per primary team   Oral agents and insulin   endocrine consult    Smoking cessation   Reinforced smoking cessation   support given

## 2020-08-31 NOTE — DISCHARGE NOTE PROVIDER - NSDCMRMEDTOKEN_GEN_ALL_CORE_FT
alcohol swabs : Apply topically to affected area 4 times a day   aspirin 81 mg oral delayed release tablet: 1 tab(s) orally once a day  Basaglar KwikPen 100 units/mL subcutaneous solution: 30 unit(s) subcutaneous once a day (at bedtime)   glucometer (per patient&#x27;s insurance): Test blood sugars four times a day. Dispense #1 glucometer.  HumaLOG KwikPen 100 units/mL injectable solution: 10 unit(s) subcutaneous 3 times a day (with meals)   lancets: 1 application subcutaneously 4 times a day   test strips (per patient&#x27;s insurance): 1 application subcutaneously 4 times a day. ** Compatible with patient&#x27;s glucometer **

## 2020-08-31 NOTE — PROGRESS NOTE ADULT - ASSESSMENT
Pt presents for Cardiac cath  PRE-PROCEDURE ASSESSMENT  [facility] -Patient seen and examined  -Labs and EKG reviewed   -Pre-procedure teaching completed with patient and family  Informed consent obtained fQuestions answered  - Pt received Asprin prior to cardiac cath   Risks & benefits of procedure and sedation and risks and benefits for the alternative therapy have been explained to the patient in layman’s terms including but not limited to: allergic reaction, bleeding, infection, arrhythmia, respiratory compromise, renal and vascular compromise, limb damage, MI, CVA, emergent CABG/Vascular Surgery and death. Informed consent obtained and in chart.

## 2020-08-31 NOTE — DISCHARGE NOTE NURSING/CASE MANAGEMENT/SOCIAL WORK - NSDCPEWEB_GEN_ALL_CORE
[place] website --- [email or web address] website --- [email or web address]

## 2020-08-31 NOTE — DISCHARGE NOTE NURSING/CASE MANAGEMENT/SOCIAL WORK - PATIENT PORTAL LINK FT
You can access the FollowMyHealth Patient Portal offered by [facility] by registering at the following website: [email or web address]. By joining [facility]’s FollowMyHealth portal, you will also be able to view your health information using other applications (apps) compatible with our system.

## 2020-08-31 NOTE — DISCHARGE NOTE NURSING/CASE MANAGEMENT/SOCIAL WORK - NSDCPEEMAIL_GEN_ALL_CORE
[facility] for Tobacco Control email [email or web address]

## 2020-08-31 NOTE — DISCHARGE NOTE PROVIDER - NSDCCPCAREPLAN_GEN_ALL_CORE_FT
PRINCIPAL DISCHARGE DIAGNOSIS  Diagnosis: Hyperglycemia  Assessment and Plan of Treatment:       SECONDARY DISCHARGE DIAGNOSES  Diagnosis: DM (diabetes mellitus), type 2, uncontrolled  Assessment and Plan of Treatment:     Diagnosis: JUN (acute kidney injury)  Assessment and Plan of Treatment:

## 2020-08-31 NOTE — PROGRESS NOTE ADULT - ATTENDING COMMENTS
Patient was seen and examined by me  49 y/o male with h/o DM and current smoker (last cigarette on Thursday) came to the ER with uncontrolled DM2 w/ symptoms and atypical chest pain likely secondary to gastroparesis or GERD     Prior cardiac work up: Stress echo in 2018: 1. The patient's functional capacity was good. 11 METs. 88% MPHR 2. No cardiac symptoms. No diagnostic ECG changes. Engel treadmill score = 9 (Low risk). There were no stress-induced wall motion abnormalities. Normal diastology and pulmonary pressures post exercise. This is a negative stress echo test for ischemia.    Patient was evaluated by me post LHC which shows non obstructive disease    Physical Examination   BP: 119/79  HR: 59 RR: 14   General: NAD AAOX3   HEENT: EOMI PERRLA   Cardiac: + S1S2 bradycardic   Chest: CTA   Extremities: no edema cyanosis or clubbing; + palpable DP/PT pulses     Labs reviewed     A/P: 49 y/o male with h/o DM and current smoker (last cigarette on Thursday) came to the ER with uncontrolled DM2 w/ symptoms and atypical chest pain likely secondary to gastroparesis or GERD    1. Atypical chest pain likely secondary to GERD/ gastroparesis   - s/p LHC shows no evidence of disease   - recommend GI evaluation in the outpatient setting   - reassess need for BB in light of no obstructive disease; for antianginal recommend Patient was seen and examined by me  51 y/o male with h/o DM and current smoker (last cigarette on Thursday) came to the ER with uncontrolled DM2 w/ symptoms and atypical chest pain likely secondary to gastroparesis or GERD     Prior cardiac work up: Stress echo in 2018: 1. The patient's functional capacity was good. 11 METs. 88% MPHR 2. No cardiac symptoms. No diagnostic ECG changes. Engel treadmill score = 9 (Low risk). There were no stress-induced wall motion abnormalities. Normal diastology and pulmonary pressures post exercise. This is a negative stress echo test for ischemia.    Patient was evaluated by me post LHC which shows non obstructive disease    Physical Examination   BP: 119/79  HR: 59 RR: 14   General: NAD AAOX3   HEENT: EOMI PERRLA   Cardiac: + S1S2 bradycardic   Chest: CTA   Extremities: no edema cyanosis or clubbing; + palpable DP/PT pulses     Labs reviewed     A/P: 51 y/o male with h/o DM and current smoker (last cigarette on Thursday) came to the ER with uncontrolled DM2 w/ symptoms and atypical chest pain likely secondary to gastroparesis or GERD    1. Atypical chest pain likely secondary to GERD/ gastroparesis   - s/p LHC shows no evidence of disease   - recommend GI evaluation in the outpatient setting   - reassess need for BB in light of no obstructive disease; for antianginal recommend Norvasc   - continue with ASA and statin (Lipitor 10mg po q daily)   - no further cardiovascular testing required   - smoking cessation discussed with patient     2. DM 2   - start Lisinopril 2.5mg po q daily   - diabetic management as per Endocrinology   - discussed compliance with diabetic medications    3. Smoking cessation   - discussed at length with patient about smoking cessation     Thank You   Paz Carreon D.O,  Cardiology Attending   #[phone]

## 2020-08-31 NOTE — DISCHARGE NOTE PROVIDER - CARE PROVIDER_API CALL
Jared Hendrickson  ENDOCRINOLOGY/METAB/DIABETES  [address] [place] [address]  Phone: [phone]  Fax: [phone]  Follow Up Time:     PMD,   in 1 week, please call his office and get an appiontment  Phone: (   )    -  Fax: (   )    -  Follow Up Time:

## 2020-08-31 NOTE — ADVANCED PRACTICE NURSE CONSULT - ASSESSMENT
went to see pt after cath. pt is a+ox3 c/o 0 pain pt states he has diabetes for 12-15 years he take metformin and basaglar. he was testing his bg but his glucodse meter broke a few weeks ago. pt was educated about the result of a1c and the importance of using insulin@ this time. pt verbalized understanding. pt was educated about the importance of bg monitoring and was advised to check bg 3-4xdaily parameters provided. discussed healthy eating habits and assisted him to make healthy choices for meals and snacks the plate method was used to teach portions. pt was advised to combine carb w protein to prevent spikes in bg. pt verbalized understanding. pt states until now he does nt recall hypos. pt was educated about ss of hypoglycemia and treatment. pt verbalized understanding.  anew glucometer provided w a log book pt was advised to log his bg results. pt has a fu appointment w dr bates ON  23, 2020 @0900A

## 2020-08-31 NOTE — DISCHARGE NOTE PROVIDER - HOSPITAL COURSE
49 y/o male with h/o DM came to the ER as his glucometer broke and for past 2 weeks he had the feeling his BG was running high as he vomited on and off , some liquid in vomitus , no blood, no abd. pain. no diarrhea. no fever. felt dizzy on the day of ER visit, has h/o blurry vision, no acute vision changes, no syncopy or near syncopy, no cp, no sob, no focal motor weakness, no speech/ swallow trouble. pt. felt dehydrated, upon arrival pt's BG was 710. ph 7.36. as per pt. he was using his insulin Basaglar, and metformin admitted for uncontrolled DM and Acute renal failure due to dehydration, he was given IV fluids and was started on lantus and humalog scale, diabetic education done and was seen by Endocrine team, his dose of lantus adjusted and was  counselled about the importance of  diet and medication compliance, his Hb a1c is >12, his Sugar levels are got some what better.       as per endocrine ok to discharge on current dose and follow up with them in the office.     for his JUN he was given IV fluids, when he came in with creatinine of 2.5, BMP monitored and now creatinine is 1.05, he was counselled to drink plenty of fluids.     Patient was noted to have Abnormal ekg , pt's inferior lead with t wave inversions, not seen in ekg from march 2019, he denies any cp, dizziness reported, serial trop came negative, seen by cardiology and he under went cardiac cath showed The coronary anatomy is normal. Left ventricular function is normal. LVEF=60%, patient's LDL was noted to be 20, d/connie his Simvastatin.     he was noted to have pseudohyponatremia with correction of glucose level, sodium level improved.     Patient is doing ok, he is being discharged home in a stable condition, his all symptoms has been resolved.         Vital Signs Last 24 Hrs    T(C): 36.9 (31 Aug 2020 07:01), Max: 36.9 (31 Aug 2020 07:01)    T(F): 98.4 (31 Aug 2020 07:01), Max: 98.4 (31 Aug 2020 07:01)    HR: 64 (31 Aug 2020 11:30) (49 - 73)    BP: 127/82 (31 Aug 2020 11:30) (113/76 - 133/78)    RR: 18 (31 Aug 2020 11:30) (18 - 18)    SpO2: 100% (31 Aug 2020 11:30) (95% - 100%)        PHYSICAL EXAM:        GENERAL: Middle age male looking comfortable      HEENT: PERRL, +EOMI    NECK: soft, Supple, No JVD,     CHEST/LUNG: Clear to auscultate bilaterally; No wheezing    HEART: S1S2+, Regular rate and rhythm; No murmurs    ABDOMEN: Soft, Nontender, Nondistended; Bowel sounds present    EXTREMITIES:  1+ Peripheral Pulses, No edema    SKIN: No rashes or lesions    NEURO: AAOX3, no focal deficits, no motor r sensory loss    PSYCH: normal mood        Total time spent 36minutes

## 2020-08-31 NOTE — DISCHARGE NOTE PROVIDER - PROVIDER TOKENS
PROVIDER:[TOKEN:[[id number]]],FREE:[LAST:[PMD],PHONE:[(   )    -],FAX:[(   )    -],ADDRESS:[in 1 week, please call his office and get an appiontment]]

## 2020-08-31 NOTE — ADVANCED PRACTICE NURSE CONSULT - RECOMMEDATIONS
CONTINUE DIABETES SELF MANAGEMENT Eduction  PT to demonstrate correct uase of insulin pen  cc- pls task pt to diabetes wellness out pt  pls consider dc pt on basaglar 30 units qhs   and ademalog 10 units before meals  pls consider dc metformin @ this time  jessy needles for 4 xdaily  lancets and strips for glucometer countur next ez for 4 xdaily

## 2020-09-23 ENCOUNTER — APPOINTMENT (OUTPATIENT)
Dept: ENDOCRINOLOGY | Facility: CLINIC | Age: 50
End: 2020-09-23
Payer: MEDICAID

## 2020-09-23 VITALS
HEIGHT: 70 IN | BODY MASS INDEX: 34.93 KG/M2 | DIASTOLIC BLOOD PRESSURE: 70 MMHG | SYSTOLIC BLOOD PRESSURE: 110 MMHG | WEIGHT: 244 LBS

## 2020-09-23 DIAGNOSIS — Z83.3 FAMILY HISTORY OF DIABETES MELLITUS: ICD-10-CM

## 2020-09-23 LAB — GLUCOSE BLDC GLUCOMTR-MCNC: 472

## 2020-09-23 PROCEDURE — 82962 GLUCOSE BLOOD TEST: CPT

## 2020-09-23 PROCEDURE — 99204 OFFICE O/P NEW MOD 45 MIN: CPT | Mod: 25

## 2020-09-23 NOTE — ASSESSMENT
[FreeTextEntry1] : T2DM\par -Long discussion had with patient about severity of disease. He is ready to make changes and appears motivated.\par -For now, increase Basaglar to 38 units and increase Ademlog to 15 units pre meal\par -Increase FS frequency to before meals and at bedtime, 4 x a day-log sheets given. Pt advised to call office if he does not see improvements (no blood sugars in 400s) so we can make more aggressive changes before next apt.\par -Pt interested in CDE in the future but feels he knows what he has to do. Will first start by not drinking regular soda.\par -Increase exercise as tolerated, goal of 30 mins a day 5x a week\par -Make apts with ophtho, podiatry for yearly evaluation\par \par \par HLD\par -Will check updated fasting lipid panel after next visit, continue statin\par \par \par HTN\par -BP stable in office, continue aceI\par \par \par RTO 4 weeks

## 2020-09-23 NOTE — PHYSICAL EXAM

## 2020-09-23 NOTE — REVIEW OF SYSTEMS
[Fatigue] : fatigue [Recent Weight Gain (___ Lbs)] : recent weight gain: [unfilled] lbs [Visual Field Defect] : visual field defect [Blurred Vision] : blurred vision [SOB on Exertion] : shortness of breath on exertion [Nausea] : nausea [Heartburn] : heartburn [Polyuria] : polyuria [Nocturia] : nocturia [Polydipsia] : polydipsia [Dysphagia] : no dysphagia [Neck Pain] : no neck pain [Dysphonia] : no dysphonia [Chest Pain] : no chest pain [Palpitations] : no palpitations [Shortness Of Breath] : no shortness of breath [Constipation] : no constipation [Vomiting] : no vomiting [Diarrhea] : no diarrhea [Headaches] : no headaches [FreeTextEntry4] : +acid reflux [FreeTextEntry8] : 6-7x

## 2020-09-23 NOTE — HISTORY OF PRESENT ILLNESS
[FreeTextEntry1] : HFU 8/2020: 51 y/o male with h/o DM came to the ER as his glucometer broke and for past 2 \par weeks he had the feeling his BG was running high as he vomited on and off , \par some liquid in vomitus , no blood, no abd. pain. no diarrhea. no fever. felt \par dizzy on the day of ER visit, has h/o blurry vision, no acute vision changes, \par no syncopy or near syncopy, no cp, no sob, no focal motor weakness, no speech/ \par swallow trouble. pt. felt dehydrated, upon arrival pt's BG was 710. ph 7.36. as \par per pt. he was using his insulin Basaglar, and metformin admitted for \par uncontrolled DM and Acute renal failure due to dehydration. Also had a cardiac cath during hospital stay which showed cardiac anatomy is normal. \par \par T2DM\par Severity: Uncontrolled\par Onset: "thought he had the flu"\par Duration: approx 12-15 years ago\par Modifying Factors: on insulin for approx 2 years\par Associated Symptoms: does not feel well when he is hyperglycemic \par \par FH: Mom, Dad, Brother- T2DM\par \par HGA1C in hospital 8/2020- 12.4\par \par SMBG\par Checks BS fasting in AM and one time pre meal \par Meter present today\par \par Fasting [id number]\par Fasting pre meal- 340-HI \par Rare 200s, no hypoglycemia\par \par FS in office 472-currently fasting\par \par Current drug regimen\par Basaglar 30 units QHS\par Ademlog 10 units pre meal\par \par Eye exam: last visit 2019, denies DR \par Foot exam: does not see podiatry, denies numbness/tingling b/l feet \par Kidney disease: had JUN in hospital, recovered after hydration\par Heart disease: denies \par \par Weight: stable, BMI 35.01\par Diet: doesn’t have a great appetite, skips meals at time\par B: egg, cereal , white rolls \par L: fast food- cheeseburger, regular cola soda \par D: whatever sister makes- pizza, pasta, veggies, meat, rice\par S: does not snack \par Exercise: a lot of walking at work, a lot of physical activity at work -lifts weights in AM \par Smoking: A pack of cigarettes every few days, last cigarette Saturday\par \par HLD\par -Atorvastatin 40 mg daily\par \par HTN\par -BP in office 110/70\par -Lisinopril 2.5 mg daily

## 2020-10-08 ENCOUNTER — EMERGENCY (EMERGENCY)
Facility: HOSPITAL | Age: 50
LOS: 1 days | Discharge: HOME | End: 2020-10-08
Attending: EMERGENCY MEDICINE
Payer: COMMERCIAL

## 2020-10-08 VITALS
HEART RATE: 68 BPM | RESPIRATION RATE: 16 BRPM | DIASTOLIC BLOOD PRESSURE: 65 MMHG | TEMPERATURE: 99 F | OXYGEN SATURATION: 99 % | SYSTOLIC BLOOD PRESSURE: 108 MMHG

## 2020-10-08 VITALS
DIASTOLIC BLOOD PRESSURE: 76 MMHG | HEART RATE: 66 BPM | RESPIRATION RATE: 18 BRPM | SYSTOLIC BLOOD PRESSURE: 138 MMHG | HEIGHT: 70 IN | TEMPERATURE: 98 F | WEIGHT: 240.08 LBS | OXYGEN SATURATION: 95 %

## 2020-10-08 LAB
ACETONE SERPL-MCNC: NEGATIVE — SIGNIFICANT CHANGE UP
ALBUMIN SERPL ELPH-MCNC: 4.4 G/DL — SIGNIFICANT CHANGE UP (ref 3.3–5.2)
ALP SERPL-CCNC: 142 U/L — HIGH (ref 40–120)
ALT FLD-CCNC: 25 U/L — SIGNIFICANT CHANGE UP
ANION GAP SERPL CALC-SCNC: 10 MMOL/L — SIGNIFICANT CHANGE UP (ref 5–17)
ANION GAP SERPL CALC-SCNC: 14 MMOL/L — SIGNIFICANT CHANGE UP (ref 5–17)
APPEARANCE UR: CLEAR — SIGNIFICANT CHANGE UP
APTT BLD: 27.8 SEC — SIGNIFICANT CHANGE UP (ref 27.5–35.5)
AST SERPL-CCNC: 17 U/L — SIGNIFICANT CHANGE UP
B-OH-BUTYR SERPL-SCNC: 0.3 MMOL/L — SIGNIFICANT CHANGE UP
BACTERIA # UR AUTO: NEGATIVE — SIGNIFICANT CHANGE UP
BASE EXCESS BLDV CALC-SCNC: 1 MMOL/L — SIGNIFICANT CHANGE UP (ref -2–2)
BASOPHILS # BLD AUTO: 0.02 K/UL — SIGNIFICANT CHANGE UP (ref 0–0.2)
BASOPHILS NFR BLD AUTO: 0.5 % — SIGNIFICANT CHANGE UP (ref 0–2)
BILIRUB SERPL-MCNC: 0.4 MG/DL — SIGNIFICANT CHANGE UP (ref 0.4–2)
BILIRUB UR-MCNC: NEGATIVE — SIGNIFICANT CHANGE UP
BUN SERPL-MCNC: 21 MG/DL — HIGH (ref 8–20)
BUN SERPL-MCNC: 26 MG/DL — HIGH (ref 8–20)
CA-I SERPL-SCNC: 1.19 MMOL/L — SIGNIFICANT CHANGE UP (ref 1.15–1.33)
CALCIUM SERPL-MCNC: 10.2 MG/DL — SIGNIFICANT CHANGE UP (ref 8.6–10.2)
CALCIUM SERPL-MCNC: 8.8 MG/DL — SIGNIFICANT CHANGE UP (ref 8.6–10.2)
CHLORIDE BLDV-SCNC: 96 MMOL/L — LOW (ref 98–107)
CHLORIDE SERPL-SCNC: 92 MMOL/L — LOW (ref 98–107)
CHLORIDE SERPL-SCNC: 99 MMOL/L — SIGNIFICANT CHANGE UP (ref 98–107)
CO2 SERPL-SCNC: 22 MMOL/L — SIGNIFICANT CHANGE UP (ref 22–29)
CO2 SERPL-SCNC: 23 MMOL/L — SIGNIFICANT CHANGE UP (ref 22–29)
COLOR SPEC: YELLOW — SIGNIFICANT CHANGE UP
CREAT SERPL-MCNC: 1.17 MG/DL — SIGNIFICANT CHANGE UP (ref 0.5–1.3)
CREAT SERPL-MCNC: 1.27 MG/DL — SIGNIFICANT CHANGE UP (ref 0.5–1.3)
DIFF PNL FLD: NEGATIVE — SIGNIFICANT CHANGE UP
EOSINOPHIL # BLD AUTO: 0.05 K/UL — SIGNIFICANT CHANGE UP (ref 0–0.5)
EOSINOPHIL NFR BLD AUTO: 1.2 % — SIGNIFICANT CHANGE UP (ref 0–6)
EPI CELLS # UR: NEGATIVE — SIGNIFICANT CHANGE UP
GAS PNL BLDV: 133 MMOL/L — LOW (ref 135–145)
GAS PNL BLDV: SIGNIFICANT CHANGE UP
GAS PNL BLDV: SIGNIFICANT CHANGE UP
GLUCOSE BLDV-MCNC: 627 MG/DL — CRITICAL HIGH (ref 70–99)
GLUCOSE SERPL-MCNC: 443 MG/DL — HIGH (ref 70–99)
GLUCOSE SERPL-MCNC: 624 MG/DL — CRITICAL HIGH (ref 70–99)
GLUCOSE UR QL: 1000 MG/DL
HCO3 BLDV-SCNC: 25 MMOL/L — SIGNIFICANT CHANGE UP (ref 21–29)
HCT VFR BLD CALC: 45.1 % — SIGNIFICANT CHANGE UP (ref 39–50)
HGB BLD-MCNC: 14.9 G/DL — SIGNIFICANT CHANGE UP (ref 13–17)
IMM GRANULOCYTES NFR BLD AUTO: 0.2 % — SIGNIFICANT CHANGE UP (ref 0–1.5)
INR BLD: 0.95 RATIO — SIGNIFICANT CHANGE UP (ref 0.88–1.16)
KETONES UR-MCNC: ABNORMAL
LACTATE BLDV-MCNC: 1.3 MMOL/L — SIGNIFICANT CHANGE UP (ref 0.5–2)
LACTATE BLDV-MCNC: 1.3 MMOL/L — SIGNIFICANT CHANGE UP (ref 0.5–2)
LEUKOCYTE ESTERASE UR-ACNC: NEGATIVE — SIGNIFICANT CHANGE UP
LYMPHOCYTES # BLD AUTO: 1.03 K/UL — SIGNIFICANT CHANGE UP (ref 1–3.3)
LYMPHOCYTES # BLD AUTO: 24.3 % — SIGNIFICANT CHANGE UP (ref 13–44)
MAGNESIUM SERPL-MCNC: 2.3 MG/DL — SIGNIFICANT CHANGE UP (ref 1.6–2.6)
MCHC RBC-ENTMCNC: 29.5 PG — SIGNIFICANT CHANGE UP (ref 27–34)
MCHC RBC-ENTMCNC: 33 GM/DL — SIGNIFICANT CHANGE UP (ref 32–36)
MCV RBC AUTO: 89.3 FL — SIGNIFICANT CHANGE UP (ref 80–100)
MONOCYTES # BLD AUTO: 0.33 K/UL — SIGNIFICANT CHANGE UP (ref 0–0.9)
MONOCYTES NFR BLD AUTO: 7.8 % — SIGNIFICANT CHANGE UP (ref 2–14)
NEUTROPHILS # BLD AUTO: 2.8 K/UL — SIGNIFICANT CHANGE UP (ref 1.8–7.4)
NEUTROPHILS NFR BLD AUTO: 66 % — SIGNIFICANT CHANGE UP (ref 43–77)
NITRITE UR-MCNC: NEGATIVE — SIGNIFICANT CHANGE UP
PCO2 BLDV: 40 MMHG — SIGNIFICANT CHANGE UP (ref 35–50)
PH BLDV: 7.42 — SIGNIFICANT CHANGE UP (ref 7.32–7.43)
PH UR: 7 — SIGNIFICANT CHANGE UP (ref 5–8)
PHOSPHATE SERPL-MCNC: 4.3 MG/DL — SIGNIFICANT CHANGE UP (ref 2.4–4.7)
PLATELET # BLD AUTO: 223 K/UL — SIGNIFICANT CHANGE UP (ref 150–400)
PO2 BLDV: 137 MMHG — HIGH (ref 25–45)
POTASSIUM BLDV-SCNC: 5.5 MMOL/L — HIGH (ref 3.4–4.5)
POTASSIUM SERPL-MCNC: 5.1 MMOL/L — SIGNIFICANT CHANGE UP (ref 3.5–5.3)
POTASSIUM SERPL-MCNC: 5.5 MMOL/L — HIGH (ref 3.5–5.3)
POTASSIUM SERPL-SCNC: 5.1 MMOL/L — SIGNIFICANT CHANGE UP (ref 3.5–5.3)
POTASSIUM SERPL-SCNC: 5.5 MMOL/L — HIGH (ref 3.5–5.3)
PROT SERPL-MCNC: 8.6 G/DL — SIGNIFICANT CHANGE UP (ref 6.6–8.7)
PROT UR-MCNC: 30 MG/DL
PROTHROM AB SERPL-ACNC: 11 SEC — SIGNIFICANT CHANGE UP (ref 10.6–13.6)
RBC # BLD: 5.05 M/UL — SIGNIFICANT CHANGE UP (ref 4.2–5.8)
RBC # FLD: 12.2 % — SIGNIFICANT CHANGE UP (ref 10.3–14.5)
RBC CASTS # UR COMP ASSIST: SIGNIFICANT CHANGE UP /HPF (ref 0–4)
SAO2 % BLDV: 99 % — SIGNIFICANT CHANGE UP
SARS-COV-2 RNA SPEC QL NAA+PROBE: SIGNIFICANT CHANGE UP
SODIUM SERPL-SCNC: 128 MMOL/L — LOW (ref 135–145)
SODIUM SERPL-SCNC: 132 MMOL/L — LOW (ref 135–145)
SP GR SPEC: 1.01 — SIGNIFICANT CHANGE UP (ref 1.01–1.02)
TROPONIN T SERPL-MCNC: <0.01 NG/ML — SIGNIFICANT CHANGE UP (ref 0–0.06)
UROBILINOGEN FLD QL: NEGATIVE MG/DL — SIGNIFICANT CHANGE UP
WBC # BLD: 4.24 K/UL — SIGNIFICANT CHANGE UP (ref 3.8–10.5)
WBC # FLD AUTO: 4.24 K/UL — SIGNIFICANT CHANGE UP (ref 3.8–10.5)
WBC UR QL: SIGNIFICANT CHANGE UP

## 2020-10-08 PROCEDURE — 85025 COMPLETE CBC W/AUTO DIFF WBC: CPT

## 2020-10-08 PROCEDURE — 83735 ASSAY OF MAGNESIUM: CPT

## 2020-10-08 PROCEDURE — 99285 EMERGENCY DEPT VISIT HI MDM: CPT

## 2020-10-08 PROCEDURE — 36415 COLL VENOUS BLD VENIPUNCTURE: CPT

## 2020-10-08 PROCEDURE — 83690 ASSAY OF LIPASE: CPT

## 2020-10-08 PROCEDURE — 99284 EMERGENCY DEPT VISIT MOD MDM: CPT | Mod: 25

## 2020-10-08 PROCEDURE — 81001 URINALYSIS AUTO W/SCOPE: CPT

## 2020-10-08 PROCEDURE — 85610 PROTHROMBIN TIME: CPT

## 2020-10-08 PROCEDURE — 96361 HYDRATE IV INFUSION ADD-ON: CPT

## 2020-10-08 PROCEDURE — 76705 ECHO EXAM OF ABDOMEN: CPT

## 2020-10-08 PROCEDURE — 85018 HEMOGLOBIN: CPT

## 2020-10-08 PROCEDURE — 93010 ELECTROCARDIOGRAM REPORT: CPT

## 2020-10-08 PROCEDURE — 84295 ASSAY OF SERUM SODIUM: CPT

## 2020-10-08 PROCEDURE — 82962 GLUCOSE BLOOD TEST: CPT

## 2020-10-08 PROCEDURE — 83880 ASSAY OF NATRIURETIC PEPTIDE: CPT

## 2020-10-08 PROCEDURE — 82435 ASSAY OF BLOOD CHLORIDE: CPT

## 2020-10-08 PROCEDURE — 96374 THER/PROPH/DIAG INJ IV PUSH: CPT

## 2020-10-08 PROCEDURE — 82010 KETONE BODYS QUAN: CPT

## 2020-10-08 PROCEDURE — 84484 ASSAY OF TROPONIN QUANT: CPT

## 2020-10-08 PROCEDURE — 87635 SARS-COV-2 COVID-19 AMP PRB: CPT

## 2020-10-08 PROCEDURE — 85014 HEMATOCRIT: CPT

## 2020-10-08 PROCEDURE — 71045 X-RAY EXAM CHEST 1 VIEW: CPT | Mod: 26

## 2020-10-08 PROCEDURE — 84100 ASSAY OF PHOSPHORUS: CPT

## 2020-10-08 PROCEDURE — 82330 ASSAY OF CALCIUM: CPT

## 2020-10-08 PROCEDURE — 80048 BASIC METABOLIC PNL TOTAL CA: CPT

## 2020-10-08 PROCEDURE — 87086 URINE CULTURE/COLONY COUNT: CPT

## 2020-10-08 PROCEDURE — 82947 ASSAY GLUCOSE BLOOD QUANT: CPT

## 2020-10-08 PROCEDURE — 76705 ECHO EXAM OF ABDOMEN: CPT | Mod: 26

## 2020-10-08 PROCEDURE — 85730 THROMBOPLASTIN TIME PARTIAL: CPT

## 2020-10-08 PROCEDURE — 93005 ELECTROCARDIOGRAM TRACING: CPT

## 2020-10-08 PROCEDURE — 84132 ASSAY OF SERUM POTASSIUM: CPT

## 2020-10-08 PROCEDURE — 71045 X-RAY EXAM CHEST 1 VIEW: CPT

## 2020-10-08 PROCEDURE — 83605 ASSAY OF LACTIC ACID: CPT

## 2020-10-08 PROCEDURE — 80053 COMPREHEN METABOLIC PANEL: CPT

## 2020-10-08 PROCEDURE — 82803 BLOOD GASES ANY COMBINATION: CPT

## 2020-10-08 PROCEDURE — 82009 KETONE BODYS QUAL: CPT

## 2020-10-08 RX ORDER — ONDANSETRON 8 MG/1
4 TABLET, FILM COATED ORAL ONCE
Refills: 0 | Status: COMPLETED | OUTPATIENT
Start: 2020-10-08 | End: 2020-10-08

## 2020-10-08 RX ORDER — INSULIN HUMAN 100 [IU]/ML
10 INJECTION, SOLUTION SUBCUTANEOUS ONCE
Refills: 0 | Status: DISCONTINUED | OUTPATIENT
Start: 2020-10-08 | End: 2020-10-08

## 2020-10-08 RX ORDER — INSULIN HUMAN 100 [IU]/ML
10 INJECTION, SOLUTION SUBCUTANEOUS ONCE
Refills: 0 | Status: COMPLETED | OUTPATIENT
Start: 2020-10-08 | End: 2020-10-08

## 2020-10-08 RX ORDER — SODIUM CHLORIDE 9 MG/ML
1000 INJECTION INTRAMUSCULAR; INTRAVENOUS; SUBCUTANEOUS ONCE
Refills: 0 | Status: COMPLETED | OUTPATIENT
Start: 2020-10-08 | End: 2020-10-08

## 2020-10-08 RX ORDER — INSULIN LISPRO 100/ML
10 VIAL (ML) SUBCUTANEOUS ONCE
Refills: 0 | Status: COMPLETED | OUTPATIENT
Start: 2020-10-08 | End: 2020-10-08

## 2020-10-08 RX ADMIN — SODIUM CHLORIDE 1000 MILLILITER(S): 9 INJECTION INTRAMUSCULAR; INTRAVENOUS; SUBCUTANEOUS at 10:41

## 2020-10-08 RX ADMIN — SODIUM CHLORIDE 1000 MILLILITER(S): 9 INJECTION INTRAMUSCULAR; INTRAVENOUS; SUBCUTANEOUS at 09:45

## 2020-10-08 RX ADMIN — INSULIN HUMAN 10 UNIT(S): 100 INJECTION, SOLUTION SUBCUTANEOUS at 09:24

## 2020-10-08 RX ADMIN — SODIUM CHLORIDE 1000 MILLILITER(S): 9 INJECTION INTRAMUSCULAR; INTRAVENOUS; SUBCUTANEOUS at 08:36

## 2020-10-08 RX ADMIN — Medication 10 UNIT(S): at 10:30

## 2020-10-08 RX ADMIN — ONDANSETRON 4 MILLIGRAM(S): 8 TABLET, FILM COATED ORAL at 08:36

## 2020-10-08 RX ADMIN — SODIUM CHLORIDE 1000 MILLILITER(S): 9 INJECTION INTRAMUSCULAR; INTRAVENOUS; SUBCUTANEOUS at 09:30

## 2020-10-08 NOTE — ED ADULT TRIAGE NOTE - CHIEF COMPLAINT QUOTE
"I think my sugars are very high" pt c/o dizziness, increase thirst and urination and abd pain.  FS completed in triage read "HI"

## 2020-10-08 NOTE — ED PROVIDER NOTE - EYES NEGATIVE STATEMENT, MLM
Ama calling with update.  Swelling to right groin is now the size of a tangerine. This does represent lessening of the swelling. They are treating with warm compresses. The incision looks good no sign of infection.    Call if you have any questions or concerns.     no discharge, no irritation, no pain, no redness, and no visual changes.

## 2020-10-08 NOTE — ED PROVIDER NOTE - ATTENDING CONTRIBUTION TO CARE
HPI: 49yo male with PMH IDDM presenting with nausea, vomiting, abdominal pain, cough, and shortness of breath x 4 days. patient states that over last 4 days his sugars haven't been well controlled. he hasn't been able to eat food without vomiting so hasn't been taking insulin lispro but has been taking lantus. No fevers/chills. +Sick contacts.     PE:  Gen: NAD  Head: NCAT  HEENT: Oral mucosa dry, normal conjunctiva  CV: RRR no murmurs, normal perfusion  Resp: CTA b/l, no wheezing, rales, rhonchi, no respiratory distress  GI: Abd Soft NTND  Neuro: No focal neuro deficits  MSK: FROM all 4 extremities, no deformity  Skin: No rash, no bruising  Psych: Normal affect    MDM: 49yo male with nausea, vomiting, malaise, hyperglycemia, not in DKA on labs, will give fluids, insulin, infectious workup, reassess. Li Morris DO     I performed a history and physical exam of the patient and discussed their management with the advanced care provider. I reviewed the advanced care provider's note and agree with the documented findings and plan of care. My medical decision making and objective findings are found above.

## 2020-10-08 NOTE — ED ADULT NURSE NOTE - OBJECTIVE STATEMENT
51yo male c/o high BS. pt states felt sob and nauseous all week, began to vomit today, BS has been very high all week, today presented to ED. pt states nephew is sick. "feel like im in dka and need an insulin gtt" pt denies fever, chills, diarrhea, cp, headache, numbness/tingling, urinary sx

## 2020-10-08 NOTE — ED PROVIDER NOTE - PATIENT PORTAL LINK FT
You can access the FollowMyHealth Patient Portal offered by [facility] by registering at the following website: [email or web address]. By joining [facility]’s FollowMyHealth portal, you will also be able to view your health information using other applications (apps) compatible with our system.

## 2020-10-08 NOTE — ED PROVIDER NOTE - CARE PROVIDER_API CALL
Lilliam Rivera  GASTROENTEROLOGY  [address], [address]  [place] [address]  Phone: [phone]  Fax: [phone]  Follow Up Time: 1-3 Days

## 2020-10-08 NOTE — ED PROVIDER NOTE - NSFOLLOWUPINSTRUCTIONS_ED_ALL_ED_FT
FOLLOW UP WITH PRIMARY CARE PROVIDER AUGUSTINE 1-3 DAYS AND ENDOCRINOLOGIST WITHIN 1 WEEK    FOLLOW UP WITH GASTROENTEROLOGIST REGARDING INCIDENTAL FINDING OF LIVER MASS     CONTINUE CHECKING BLOOD SUGAR FREQUENTLY AND USING INSULIN AS PRESCRIBED     DRINK PLENTY OF FLUID AND EAT A HEALTHY DIET LOW IN CARBOHYDRATES     RETURN TO ER SOONER IF YOU DEVELOP ANY WORSENING SYMPTOMS, CHEST PAIN, SHORTNESS OF BREATH, CHANGES IN VISION, SEVERE HEADACHE, WEAKNESS, OR UNABLE TO TOLERATE FOOD OR WATER.

## 2020-10-08 NOTE — ED PROVIDER NOTE - CLINICAL SUMMARY MEDICAL DECISION MAKING FREE TEXT BOX
49 y/o male with sig PMH IDDM presenting with hyperglycemia, n/v, adnominal pain x 5 days. PE unremarkable. POCT BS >530. Will rule out DKA, vs viral illness vs COVID. Will obtain labs, cxray, covid, and rx IVF and insulin pending K+.

## 2020-10-08 NOTE — ED PROVIDER NOTE - OBJECTIVE STATEMENT
49 y/o male with sig PMHX of IDDM presenting with persistent elevated blood sugars x 5 days associated with dizziness, COLLAZO, abdominal pain, increased urination, thirst, and nausea. His BS have been ranging from 300 to >500 and he has been taking 38 units of Basaglar HS and 14 units Lispro prior to meals. He had two episodes of non biliary emesis last night and this morning. He notes over the past week he had dizziness, COLLAZO when ambulating as well as a cough and  loss of taste. He also notes his nephew who he resides with is sick with a cough. He was admitted approx 1 month ago for similar sx and had cardiac cath. Denies any fever, cp, ha, persistent dizziness, weakness.

## 2020-10-08 NOTE — ED PROVIDER NOTE - PROGRESS NOTE DETAILS
EA: Patient is well appearing and verbalizes improvement of sx. Cxay and US of gallbladder are unremarkable. BMP has improved with insulin and 2nd IVF bolus. UA negative for bacteria. Will PO challenge and continue to trend BS, if pt able to tolerate, will DC with close PCP and endocrinology follow up. EA: Patient is well appearing and verbalizes improvement of sx. Cxay unremarkable and US of gallbladder shows no stones or cholecystitis but possible hemangioma of liver which will require outpatient follow up. BMP has improved with insulin and 2nd IVF bolus. UA negative for bacteria. Will PO challenge and continue to trend BS, if pt able to tolerate, will DC with close PCP and endocrinology follow up. EA: Patient BS continues to trend down and he is able to tolerate PO intake. Discussed with patient, findings, plan of care, follow up, and return precautions. Patient verbalizes understanding and agrees with plan of care. Patient informed of incidental finding of liver mass, likely hemangioma, and instructed to f/u with PMD. Due to sunrise being on downtime patient results unable to be printed. Patient informed to follow up with medical records to obtain copy of results or to access patient portal for results. Li Morris DO

## 2020-10-10 LAB
CULTURE RESULTS: SIGNIFICANT CHANGE UP
SPECIMEN SOURCE: SIGNIFICANT CHANGE UP

## 2020-10-21 ENCOUNTER — APPOINTMENT (OUTPATIENT)
Dept: ENDOCRINOLOGY | Facility: CLINIC | Age: 50
End: 2020-10-21
Payer: MEDICAID

## 2020-10-21 ENCOUNTER — TRANSCRIPTION ENCOUNTER (OUTPATIENT)
Age: 50
End: 2020-10-21

## 2020-10-21 VITALS
DIASTOLIC BLOOD PRESSURE: 70 MMHG | WEIGHT: 245 LBS | SYSTOLIC BLOOD PRESSURE: 110 MMHG | HEIGHT: 70 IN | BODY MASS INDEX: 35.07 KG/M2 | TEMPERATURE: 97.2 F

## 2020-10-21 PROCEDURE — 99214 OFFICE O/P EST MOD 30 MIN: CPT

## 2020-10-21 NOTE — ASSESSMENT
[FreeTextEntry1] : Will call clinical coordination to organize pt for an earlier apt with GI considering he was told he has a mass on his liver but wont be seen until 11/16\par \par T2DM\par -Long discussion had with patient about severity of disease. He is ready to make changes and appears motivated. He stopped drinking soda but still eats poorly.\par -For now, increase Basaglar to 45 units and increase Ademlog to 18 units pre meal\par -Increase FS frequency to before meals and at bedtime, 4 x a day-log sheets given. Pt advised to call office if he does not see improvements (no blood sugars in 400s) so we can make more aggressive changes before next apt. He should call me whenever he needs anything, especially if its blood sugar related. \par -Pt interested in CDE in the future but feels he knows what he has to do. \par -Increase exercise as tolerated, goal of 30 mins a day 5x a week\par -Make apts with ophtho, podiatry for yearly evaluation\par \par \par HLD\par -Will check updated fasting lipid panel after next visit, continue statin\par \par \par HTN\par -BP stable in office, continue aceI\par \par \par RTO 2 weeks

## 2020-10-21 NOTE — REVIEW OF SYSTEMS
[Fatigue] : fatigue [Recent Weight Gain (___ Lbs)] : recent weight gain: [unfilled] lbs [Recent Weight Loss (___ Lbs)] : no recent weight loss [Visual Field Defect] : visual field defect [Blurred Vision] : blurred vision [Dysphagia] : no dysphagia [Neck Pain] : no neck pain [Dysphonia] : no dysphonia [Chest Pain] : no chest pain [Palpitations] : no palpitations [SOB on Exertion] : shortness of breath on exertion [Nausea] : nausea [Constipation] : constipation [Heartburn] : heartburn [Polyuria] : polyuria [Nocturia] : nocturia [Headaches] : no headaches [Polydipsia] : polydipsia [FreeTextEntry4] : +acid reflux

## 2020-10-21 NOTE — HISTORY OF PRESENT ILLNESS
[FreeTextEntry1] : Pt returned to the hospital again 2 weeks ago for hyperglycemia. Had a CT scan and has a mass on his liver. Pending GI apt on 11/16/2020. \par \par T2DM\par Severity: Uncontrolled\par Onset: "thought he had the flu"\par Duration: approx 12-15 years ago\par Modifying Factors: on insulin for approx 2 years\par Associated Symptoms: does not feel well when he is hyperglycemic \par \par FH: Mom, Dad, Brother- T2DM\par \par HGA1C in hospital 8/2020- 12.4\par \par SMBG\par Checks BS fasting in AM (only once)\par \par Fasting FS-400s\par \par Current drug regimen\par Basaglar 38 units QHS\par Ademlog 14 units pre meal\par \par Eye exam: last visit 2019, denies DR \par Foot exam: does not see podiatry, denies numbness/tingling b/l feet \par Kidney disease: had JUN in hospital, recovered after hydration\par Heart disease: denies \par \par Weight: stable, BMI 35.01\par Diet: doesn’t have a great appetite, skips meals at time\par B: egg, cereal , white rolls \par L: fast food- cheeseburger, stopped drinking regular soda\par D: whatever sister makes- pizza, pasta, veggies, meat, rice\par S: does not snack \par Exercise: a lot of walking at work, a lot of physical activity at work -lifts weights in AM \par Smoking: A pack of cigarettes every few days, last cigarette Saturday\par \par HLD\par -Atorvastatin 40 mg daily\par \par HTN\par -BP in office 110/70\par -Lisinopril 2.5 mg daily

## 2020-10-30 ENCOUNTER — APPOINTMENT (OUTPATIENT)
Dept: HEPATOLOGY | Facility: CLINIC | Age: 50
End: 2020-10-30
Payer: MEDICAID

## 2020-10-30 VITALS
HEART RATE: 65 BPM | WEIGHT: 250 LBS | TEMPERATURE: 98 F | DIASTOLIC BLOOD PRESSURE: 70 MMHG | HEIGHT: 70 IN | BODY MASS INDEX: 35.79 KG/M2 | SYSTOLIC BLOOD PRESSURE: 125 MMHG

## 2020-10-30 DIAGNOSIS — R74.8 ABNORMAL LEVELS OF OTHER SERUM ENZYMES: ICD-10-CM

## 2020-10-30 PROCEDURE — 99203 OFFICE O/P NEW LOW 30 MIN: CPT | Mod: 25

## 2020-10-30 PROCEDURE — 99072 ADDL SUPL MATRL&STAF TM PHE: CPT

## 2020-10-30 NOTE — CONSULT LETTER
[Dear  ___] : Dear  [unfilled], [Consult Letter:] : I had the pleasure of evaluating your patient, [unfilled]. [( Thank you for referring [unfilled] for consultation for _____ )] : Thank you for referring [unfilled] for consultation for [unfilled] [Please see my note below.] : Please see my note below. [Consult Closing:] : Thank you very much for allowing me to participate in the care of this patient.  If you have any questions, please do not hesitate to contact me. [Sincerely,] : Sincerely, [FreeTextEntry2] : Claudia Morfin [FreeTextEntry3] : Dr. Addison Arora MD\par  of Medicine\par [facility]\par [facility] / [facility]\par

## 2020-10-30 NOTE — PHYSICAL EXAM
[Scleral Icterus] : No Scleral Icterus [Spider Angioma] : No spider angioma(s) were observed [Ascites Fluid Wave] : no ascites fluid wave [Ascites Tense] : ascites is not tense [Non-Tender] : non-tender [Asterixis] : no asterixis observed [Jaundice] : No jaundice [Depression] : no depression [General Appearance - Alert] : alert [General Appearance - In No Acute Distress] : in no acute distress [Outer Ear] : the ears and nose were normal in appearance [Oropharynx] : the oropharynx was normal [Neck Appearance] : the appearance of the neck was normal [Neck Cervical Mass (___cm)] : no neck mass was observed [Jugular Venous Distention Increased] : there was no jugular-venous distention [Thyroid Diffuse Enlargement] : the thyroid was not enlarged [Thyroid Nodule] : there were no palpable thyroid nodules [Auscultation Breath Sounds / Voice Sounds] : lungs were clear to auscultation bilaterally [Heart Rate And Rhythm] : heart rate was normal and rhythm regular [Heart Sounds] : normal S1 and S2 [Heart Sounds Gallop] : no gallops [Murmurs] : no murmurs [Heart Sounds Pericardial Friction Rub] : no pericardial rub [Bowel Sounds] : normal bowel sounds [Abdomen Soft] : soft [Abdomen Tenderness] : non-tender [Abdomen Mass (___ Cm)] : no abdominal mass palpated [Skin Color & Pigmentation] : normal skin color and pigmentation [Skin Turgor] : normal skin turgor [] : no rash [Oriented To Time, Place, And Person] : oriented to person, place, and time [Impaired Insight] : insight and judgment were intact [Affect] : the affect was normal

## 2020-10-30 NOTE — HISTORY OF PRESENT ILLNESS
[Tattoo] : tattoo(s) [IV Drug Use] : no IV drug use [Body Piercing] : no body piercing [Transfusion before 1992] : no transfusion before 1992 [Transplant before 1992] : no transplant before 1992 [Incarceration] : no incarceration [Alcohol Abuse] : no alcohol abuse [Cocaine Use] : no cocaine use [de-identified] : 49 y/o M w/ hx of IDDM, HLD, HTN, obesity, chronic smoker here for initial liver clinic appt regarding liver lesion that was recently seen on a ultrasound in September 2020 as a work-up for n/v. That was the first time he was told he had anything on his liver.\par \par Drinks a beer on the weekend. No weight loss or weight gain.\par Recently started seeing endocrinology in September 2020.\par No family history of liver problems.\par Never had a colonoscopy. Says eyes were yellow once about a year ago.\par He has been hospitalized about 4 times over the past few years for diabetes complications. \par Works for [employer] for [employer]. Works in [place].

## 2020-11-02 ENCOUNTER — TRANSCRIPTION ENCOUNTER (OUTPATIENT)
Age: 50
End: 2020-11-02

## 2020-11-02 DIAGNOSIS — K76.9 LIVER DISEASE, UNSPECIFIED: ICD-10-CM

## 2020-11-02 LAB
ALBUMIN SERPL ELPH-MCNC: 4.5 G/DL
ALP BLD-CCNC: 93 U/L
ALT SERPL-CCNC: 38 U/L
ANION GAP SERPL CALC-SCNC: 11 MMOL/L
AST SERPL-CCNC: 17 U/L
BASOPHILS # BLD AUTO: 0.03 K/UL
BASOPHILS NFR BLD AUTO: 0.6 %
BILIRUB SERPL-MCNC: 0.3 MG/DL
BUN SERPL-MCNC: 9 MG/DL
CALCIUM SERPL-MCNC: 9.7 MG/DL
CHLORIDE SERPL-SCNC: 99 MMOL/L
CO2 SERPL-SCNC: 27 MMOL/L
CREAT SERPL-MCNC: 0.95 MG/DL
DEPRECATED KAPPA LC FREE/LAMBDA SER: 2.24 RATIO
EOSINOPHIL # BLD AUTO: 0.06 K/UL
EOSINOPHIL NFR BLD AUTO: 1.2 %
ESTIMATED AVERAGE GLUCOSE: >398 MG/DL
FERRITIN SERPL-MCNC: 226 NG/ML
GGT SERPL-CCNC: 23 U/L
GLUCOSE SERPL-MCNC: 218 MG/DL
HBA1C MFR BLD HPLC: >15.5 %
HBV CORE IGG+IGM SER QL: NONREACTIVE
HBV SURFACE AB SER QL: NONREACTIVE
HBV SURFACE AG SER QL: NONREACTIVE
HCT VFR BLD CALC: 43.4 %
HCV AB SER QL: NONREACTIVE
HCV S/CO RATIO: 0.09 S/CO
HEPATITIS A IGG ANTIBODY: NONREACTIVE
HGB BLD-MCNC: 13.8 G/DL
IGA SER QL IEP: 474 MG/DL
IGG SER QL IEP: 1578 MG/DL
IGM SER QL IEP: 200 MG/DL
IMM GRANULOCYTES NFR BLD AUTO: 0.2 %
INR PPP: 0.89 RATIO
IRON SATN MFR SERPL: 24 %
IRON SERPL-MCNC: 73 UG/DL
KAPPA LC CSF-MCNC: 1.71 MG/DL
KAPPA LC SERPL-MCNC: 3.83 MG/DL
LYMPHOCYTES # BLD AUTO: 1.54 K/UL
LYMPHOCYTES NFR BLD AUTO: 31.7 %
MAN DIFF?: NORMAL
MCHC RBC-ENTMCNC: 29.7 PG
MCHC RBC-ENTMCNC: 31.8 GM/DL
MCV RBC AUTO: 93.5 FL
MONOCYTES # BLD AUTO: 0.35 K/UL
MONOCYTES NFR BLD AUTO: 7.2 %
NEUTROPHILS # BLD AUTO: 2.87 K/UL
NEUTROPHILS NFR BLD AUTO: 59.1 %
PLATELET # BLD AUTO: 188 K/UL
POTASSIUM SERPL-SCNC: 4.4 MMOL/L
PROT SERPL-MCNC: 8 G/DL
PT BLD: 10.7 SEC
RBC # BLD: 4.64 M/UL
RBC # FLD: 12.9 %
SARS-COV-2 IGG SERPL IA-ACNC: 0.08 INDEX
SARS-COV-2 IGG SERPL QL IA: NEGATIVE
SODIUM SERPL-SCNC: 137 MMOL/L
TIBC SERPL-MCNC: 302 UG/DL
UIBC SERPL-MCNC: 229 UG/DL
WBC # FLD AUTO: 4.86 K/UL

## 2020-11-04 ENCOUNTER — APPOINTMENT (OUTPATIENT)
Dept: ENDOCRINOLOGY | Facility: CLINIC | Age: 50
End: 2020-11-04
Payer: MEDICAID

## 2020-11-04 VITALS
WEIGHT: 260 LBS | HEIGHT: 70 IN | SYSTOLIC BLOOD PRESSURE: 110 MMHG | BODY MASS INDEX: 37.22 KG/M2 | TEMPERATURE: 97.3 F | DIASTOLIC BLOOD PRESSURE: 80 MMHG

## 2020-11-04 PROCEDURE — 99072 ADDL SUPL MATRL&STAF TM PHE: CPT

## 2020-11-04 PROCEDURE — 99214 OFFICE O/P EST MOD 30 MIN: CPT

## 2020-11-04 NOTE — ASSESSMENT
[FreeTextEntry1] : T2DM\par -UPON INVESTIGATION, WE DISCOVERED PT IS MISSING HIS BASAL INSULIN 5-6X A WEEK. He will begin to take it 100% of the time in the AM . Same doses. By him taking meds 100%  of the time, we will be able to make appropriate dose adjustments.\par -Increase FS frequency to before meals and at bedtime, 4 x a day-log sheets given. Pt advised to call office if he does not see improvements (no blood sugars in 400s) so we can make more aggressive changes before next apt. He should call me whenever he needs anything, especially if its blood sugar related. \par -Pt interested in CDE in the future but feels he knows what he has to do. \par -Increase exercise as tolerated, goal of 30 mins a day 5x a week\par -Make apts with ophtho, podiatry for yearly evaluation\par \par \par HLD\par -Continue statin, fasting lipid panel needed\par \par \par HTN\par -BP stable in office, continue aceI\par \par \par RTO 4 weeks, labs before next visit

## 2020-11-04 NOTE — HISTORY OF PRESENT ILLNESS
[FreeTextEntry1] : Having GI work up for liver mass\par \par T2DM\par Severity: Uncontrolled\par Onset: "thought he had the flu"\par Duration: approx 12-15 years ago\par Modifying Factors: on insulin for approx 2 years\par Associated Symptoms: does not feel well when he is hyperglycemic \par \par FH: Mom, Dad, Brother- T2DM\par \par SMBG\par Checks BS a few times a day\par On average 200-300 fasting\par On average throughout the day 200-400\par \par HGA1C >15.5\par \par Current drug regimen\par Basaglar 44 units QHS- falls asleep approx 6/7 days a week without taking!!! \par Ademlog 14 units pre meal\par \par Eye exam: last visit 2019, denies  \par Foot exam: does not see podiatry, denies numbness/tingling b/l feet \par Kidney disease: had JUN in hospital, recovered after hydration\par Heart disease: denies \par \par Weight: gained approx 10 lbs\par Diet: doesn’t have a great appetite, skips meals at time\par B: egg, cereal , white rolls \par L: fast food- cheeseburger, stopped drinking regular soda\par D: whatever sister makes- pizza, pasta, veggies, meat, rice\par S: does not snack \par Exercise: a lot of walking at work, a lot of physical activity at work -lifts weights in AM \par Smoking: A pack of cigarettes every few days, last cigarette Saturday\par \par HLD\par -Atorvastatin 40 mg daily\par -No updated lipid panel\par \par HTN\par -BP in office 110/80\par -Lisinopril 2.5 mg daily

## 2020-11-16 ENCOUNTER — APPOINTMENT (OUTPATIENT)
Dept: GASTROENTEROLOGY | Facility: CLINIC | Age: 50
End: 2020-11-16

## 2020-11-30 ENCOUNTER — LABORATORY RESULT (OUTPATIENT)
Age: 50
End: 2020-11-30

## 2020-11-30 ENCOUNTER — APPOINTMENT (OUTPATIENT)
Dept: ENDOCRINOLOGY | Facility: CLINIC | Age: 50
End: 2020-11-30
Payer: MEDICAID

## 2020-11-30 PROCEDURE — 99072 ADDL SUPL MATRL&STAF TM PHE: CPT

## 2020-11-30 PROCEDURE — 99214 OFFICE O/P EST MOD 30 MIN: CPT

## 2020-11-30 NOTE — ASSESSMENT
[FreeTextEntry1] : T2DM\par -Improvement noted with patient taking insulin as prescribed. Continue current dose of insulin, with 100% compliance. \par -Restart taking FS before meals and at bedtime, 4 x a day-log sheets given. Pt advised to call office if he does not see improvements (no blood sugars in 400s) so we can make more aggressive changes before next apt. He should call me whenever he needs anything, especially if its blood sugar related. \par -Pt interested in CDE in the future but feels he knows what he has to do. \par -Increase exercise as tolerated, goal of 30 mins a day 5x a week\par -Make apts with ophtho, podiatry for yearly evaluation\par \par \par HLD\par -Continue statin, fasting lipid panel needed\par \par \par HTN\par -BP stable in office, continue aceI\par \par Pt will have fasting labs done today\par RTO 4 weeks

## 2020-11-30 NOTE — REVIEW OF SYSTEMS
[Fatigue] : fatigue [Recent Weight Gain (___ Lbs)] : recent weight gain: [unfilled] lbs [Recent Weight Loss (___ Lbs)] : no recent weight loss [Visual Field Defect] : visual field defect [Blurred Vision] : blurred vision [Dysphagia] : no dysphagia [Neck Pain] : no neck pain [Dysphonia] : no dysphonia [Chest Pain] : no chest pain [Shortness Of Breath] : no shortness of breath [Nausea] : no nausea [Constipation] : no constipation [Vomiting] : no vomiting [Diarrhea] : no diarrhea [Polyuria] : no polyuria [Polydipsia] : no polydipsia [FreeTextEntry3] : thinks his eye sight is improving due to improving blood sugars [FreeTextEntry8] : improving  [de-identified] : improving

## 2020-11-30 NOTE — HISTORY OF PRESENT ILLNESS
[FreeTextEntry1] : Having GI work up for liver mass\par \par T2DM\par Severity: Uncontrolled\par Onset: "thought he had the flu"\par Duration: approx 12-15 years ago\par Modifying Factors: on insulin for approx 2 years\par Associated Symptoms: does not feel well when he is hyperglycemic \par \par FH: Mom, Dad, Brother- T2DM\par \par SMBG\par FS in office-128!!\par \par Has not been checking blood sugrs for approx one week because he was afraid of the numbers but they appear to be improving!!!\par HGA1C >15.5\par \par Current drug regimen\par Basaglar 50 units QHS- now taking appropriately \par Ademlog 15 units pre meal\par \par Eye exam: last visit 2019, denies  \par Foot exam: does not see podiatry, denies numbness/tingling b/l feet \par Kidney disease: had JUN in hospital, recovered after hydration\par Heart disease: denies \par \par Weight: gained approx 10 lbs\par Diet: doesn’t have a great appetite, skips meals at time\par B: egg, cereal , white rolls \par L: fast food- cheeseburger, stopped drinking regular soda\par D: whatever sister makes- pizza, pasta, veggies, meat, rice\par S: does not snack \par Exercise: a lot of walking at work, a lot of physical activity at work -lifts weights in AM \par Smoking: A pack of cigarettes every few days\par \par HLD\par -Atorvastatin 40 mg daily\par -No updated lipid panel\par \par HTN\par -BP in office 120/80\par -Lisinopril 2.5 mg daily

## 2020-12-01 ENCOUNTER — NON-APPOINTMENT (OUTPATIENT)
Age: 50
End: 2020-12-01

## 2020-12-28 ENCOUNTER — APPOINTMENT (OUTPATIENT)
Dept: ENDOCRINOLOGY | Facility: CLINIC | Age: 50
End: 2020-12-28
Payer: MEDICAID

## 2020-12-28 VITALS
WEIGHT: 260 LBS | DIASTOLIC BLOOD PRESSURE: 80 MMHG | SYSTOLIC BLOOD PRESSURE: 110 MMHG | TEMPERATURE: 98 F | HEIGHT: 70 IN | BODY MASS INDEX: 37.22 KG/M2

## 2020-12-28 PROCEDURE — 99072 ADDL SUPL MATRL&STAF TM PHE: CPT

## 2020-12-28 PROCEDURE — 99214 OFFICE O/P EST MOD 30 MIN: CPT

## 2020-12-28 NOTE — REVIEW OF SYSTEMS
[Decreased Appetite] : decreased appetite [Heartburn] : heartburn [Fatigue] : no fatigue [Recent Weight Gain (___ Lbs)] : no recent weight gain [Recent Weight Loss (___ Lbs)] : no recent weight loss [Visual Field Defect] : no visual field defect [Blurred Vision] : no blurred vision [Dysphagia] : no dysphagia [Neck Pain] : no neck pain [Dysphonia] : no dysphonia [Chest Pain] : no chest pain [Shortness Of Breath] : no shortness of breath [Constipation] : no constipation [Diarrhea] : no diarrhea [Polyuria] : no polyuria [Polydipsia] : no polydipsia

## 2020-12-28 NOTE — HISTORY OF PRESENT ILLNESS
[FreeTextEntry1] : Having GI work up for liver mass- next apt 1/2021\par \par T2DM\par Severity: Uncontrolled\par Onset: "thought he had the flu"\par Duration: approx 12-15 years ago\par Modifying Factors: on insulin for approx 2 years\par Associated Symptoms: does not feel well when he is hyperglycemic \par \par FH: Mom, Dad, Brother- T2DM\par \par SMBG\par Checking BS 2-3 a day however did not bring logs or meter today\par On average 180-230s\par \par \par Current drug regimen\par Basaglar 50 units QHS- now taking appropriately \par Ademlog 15 units pre meal\par \par Eye exam: last visit 2019, denies DR \par Foot exam: does not see podiatry, denies numbness/tingling b/l feet \par Kidney disease: had JUN in hospital, recovered after hydration\par Heart disease: denies \par \par Weight: now stable\par Diet: doesn’t have a great appetite, skips meals at time\par B: egg, cereal , white rolls \par L: fast food- cheeseburger, stopped drinking regular soda\par D: whatever sister makes- pizza, pasta, veggies, meat, rice\par S: does not snack \par Exercise: a lot of walking at work, a lot of physical activity at work -lifts weights in AM \par Smoking: A pack of cigarettes every few days\par \par HLD\par -Atorvastatin 40 mg daily\par -Triglyceride 105, Total cholesterol 135, HDL 56, LDL 58\par \par HTN\par -BP in office 110/80\par -Lisinopril 2.5 mg daily\par \par Vit D Def\par -Prescribed 50,000 IU weekly supplement, only took first one yesterday

## 2020-12-28 NOTE — ASSESSMENT
[FreeTextEntry1] : T2DM\par -Improvement noted with patient taking insulin as prescribed on recall however no data present today, increase Ademlog to 18 units as it appears he is trending BS higher than goal. \par -Restart taking FS before meals and at bedtime, 4 x a day-log sheets given. Pt advised to call office if he does not see improvements (no blood sugars in 400s) so we can make more aggressive changes before next apt. He should call me whenever he needs anything, especially if its blood sugar related. \par -Pt interested in CDE in the future but feels he knows what he has to do. \par -Increase exercise as tolerated, goal of 30 mins a day 5x a week\par -Make apts with ophtho, podiatry for yearly evaluation\par \par \par HLD\par -Continue statin, fasting lipid panel at goal\par \par HTN\par -BP stable in office, continue aceI\par \par Vit D Def\par -Continue weekly 50,000 IU supplement\par \par RTO 6 weeks, must bring logs or meter to next visit

## 2021-01-25 ENCOUNTER — APPOINTMENT (OUTPATIENT)
Dept: HEPATOLOGY | Facility: CLINIC | Age: 51
End: 2021-01-25

## 2021-02-03 ENCOUNTER — APPOINTMENT (OUTPATIENT)
Dept: ENDOCRINOLOGY | Facility: CLINIC | Age: 51
End: 2021-02-03
Payer: MEDICAID

## 2021-02-03 VITALS
DIASTOLIC BLOOD PRESSURE: 75 MMHG | BODY MASS INDEX: 40.37 KG/M2 | TEMPERATURE: 95.9 F | WEIGHT: 282 LBS | SYSTOLIC BLOOD PRESSURE: 112 MMHG | HEIGHT: 70 IN

## 2021-02-03 PROCEDURE — 99214 OFFICE O/P EST MOD 30 MIN: CPT

## 2021-02-03 PROCEDURE — 99072 ADDL SUPL MATRL&STAF TM PHE: CPT

## 2021-02-03 NOTE — REVIEW OF SYSTEMS
[Recent Weight Gain (___ Lbs)] : recent weight gain: [unfilled] lbs [Fatigue] : no fatigue [Visual Field Defect] : no visual field defect [Blurred Vision] : no blurred vision [Chest Pain] : no chest pain [Shortness Of Breath] : no shortness of breath [Constipation] : no constipation [Diarrhea] : no diarrhea [Polyuria] : no polyuria [Polydipsia] : no polydipsia

## 2021-02-03 NOTE — ASSESSMENT
[FreeTextEntry1] : T2DM\par -Improvement noted with patient taking insulin as prescribed on recall however no data present today.Pt needs to lose weight. Will prescribe GLP1. No changes to insulin regimen at this time. \par -Begin Ozempic 0.25 mg weekly for 2 weeks then increase to 0.5 mg weekly.\par -Pt interested in CDE in the future but feels he knows what he has to do. \par -Increase exercise as tolerated, goal of 30 mins a day 5x a week\par -Make apts with ophtho, podiatry for yearly evaluation\par \par \par HLD\par -Continue statin, fasting lipid panel at goal\par \par HTN\par -BP stable in office, continue aceI\par \par Vit D Def\par -Continue weekly 50,000 IU supplement\par \par Fasting labs needed before next visit\par RTO 4 weeks, must bring logs or meter to next visit

## 2021-02-03 NOTE — HISTORY OF PRESENT ILLNESS
[FreeTextEntry1] : Pt biggest complaint is weight gain\par \par T2DM\par Severity: Uncontrolled\par Onset: "thought he had the flu"\par Duration: approx 12-15 years ago\par Modifying Factors: on insulin for approx 2 years\par Associated Symptoms: does not feel well when he is hyperglycemic \par \par FH: Mom, Dad, Brother- T2DM\par \par SMBG\par Checking BS 2-3 a day however did not bring logs or meter today\par On average 100s and 200s\par \par Current drug regimen\par Basaglar 50 units once daily- now taking appropriately \par Ademlog 15-20 units pre meal\par \par Eye exam: last visit 2019, denies DR \par Foot exam: does not see podiatry, denies numbness/tingling b/l feet \par Kidney disease: had JUN in hospital, recovered after hydration\par Heart disease: denies \par \par Weight: gaining weight\par Diet: doesn’t have a great appetite, skips meals at time\par B: egg, cereal , white rolls \par L: fast food- cheeseburger, stopped drinking regular soda\par D: whatever sister makes- pizza, pasta, veggies, meat, rice\par S: does not snack \par Exercise: a lot of walking at work, a lot of physical activity at work -lifts weights in AM \par Smoking: A pack of cigarettes every few days\par \par HLD\par -Atorvastatin 40 mg daily\par -Triglyceride 105, Total cholesterol 135, HDL 56, LDL 58\par \par HTN\par -BP in office 112/75\par -Lisinopril 2.5 mg daily\par \par Vit D Def\par -Currently taking 50,000 IU weekly supplement

## 2021-02-24 ENCOUNTER — APPOINTMENT (OUTPATIENT)
Dept: HEPATOLOGY | Facility: CLINIC | Age: 51
End: 2021-02-24

## 2021-03-10 ENCOUNTER — APPOINTMENT (OUTPATIENT)
Dept: ENDOCRINOLOGY | Facility: CLINIC | Age: 51
End: 2021-03-10

## 2021-03-28 NOTE — ED PROVIDER NOTE - CARE PLAN
20 y.O male with left ankle twisting injury with ecchymosis and swollen on lateral malleolus , unable to get xray at urgent care    xray tib fib and ankle  R.O sp / st vs fracture
Principal Discharge DX:	Hyperglycemia  Secondary Diagnosis:	Hemangioma of liver

## 2021-05-03 RX ORDER — PEN NEEDLE, DIABETIC 29 G X1/2"
31G X 5 MM NEEDLE, DISPOSABLE MISCELLANEOUS
Qty: 100 | Refills: 0 | Status: DISCONTINUED | COMMUNITY
Start: 2020-07-20 | End: 2021-05-03

## 2021-05-04 ENCOUNTER — APPOINTMENT (OUTPATIENT)
Dept: ENDOCRINOLOGY | Facility: CLINIC | Age: 51
End: 2021-05-04
Payer: MEDICAID

## 2021-05-04 VITALS
WEIGHT: 274 LBS | HEIGHT: 70 IN | SYSTOLIC BLOOD PRESSURE: 128 MMHG | DIASTOLIC BLOOD PRESSURE: 86 MMHG | BODY MASS INDEX: 39.22 KG/M2

## 2021-05-04 PROCEDURE — 99072 ADDL SUPL MATRL&STAF TM PHE: CPT

## 2021-05-04 PROCEDURE — 99214 OFFICE O/P EST MOD 30 MIN: CPT

## 2021-05-04 RX ORDER — METFORMIN HYDROCHLORIDE 1000 MG/1
1000 TABLET, COATED ORAL
Qty: 60 | Refills: 0 | Status: DISCONTINUED | COMMUNITY
Start: 2020-07-20 | End: 2021-05-04

## 2021-05-04 RX ORDER — LISINOPRIL 2.5 MG/1
2.5 TABLET ORAL
Qty: 90 | Refills: 1 | Status: ACTIVE | COMMUNITY
Start: 1900-01-01 | End: 1900-01-01

## 2021-05-04 RX ORDER — PNEUMOCOCCAL 23-VAL P-SAC VAC 25MCG/0.5
25 VIAL (ML) INJECTION
Qty: 1 | Refills: 0 | Status: DISCONTINUED | COMMUNITY
Start: 2020-09-21 | End: 2021-05-04

## 2021-05-04 NOTE — REVIEW OF SYSTEMS
[Recent Weight Loss (___ Lbs)] : recent weight loss: [unfilled] lbs [Nausea] : nausea [Diarrhea] : diarrhea [Fatigue] : no fatigue [Recent Weight Gain (___ Lbs)] : no recent weight gain [Visual Field Defect] : no visual field defect [Blurred Vision] : no blurred vision [Chest Pain] : no chest pain [Shortness Of Breath] : no shortness of breath [Polyuria] : no polyuria [Polydipsia] : no polydipsia

## 2021-05-04 NOTE — HISTORY OF PRESENT ILLNESS
[FreeTextEntry1] : Pt biggest complaint is weight gain but has lost 10 lbs since starting Ozempic\par \par T2DM\par Severity: Uncontrolled\par Onset: "thought he had the flu"\par Duration: approx 12-15 years ago\par Modifying Factors: on insulin for approx 2 years\par Associated Symptoms: does not feel well when he is hyperglycemic \par \par FH: Mom, Dad, Brother- T2DM\par \par SMBG\par Checking BS 1x a day\par On average 180-200 (all different times throughout the day)\par Denies hypoglycemic events\par \par Current drug regimen\par Basaglar 50 units once daily- now taking appropriately \par Ademlog 20 units pre meal\par Ozempic 0.5 mg weekly -SUNDAY\par \par Eye exam: last visit 2021- denies DR\par Foot exam: does not see podiatry, denies numbness/tingling b/l feet \par Kidney disease: had JUN in hospital, recovered after hydration\par Heart disease: denies \par \par Weight: has lost 8 lbs since 2/2021\par Diet: doesn’t have a great appetite, skips meals at time\par B: egg, cereal , white rolls \par L: fast food- cheeseburger, stopped drinking regular soda\par D: whatever sister makes- pizza, pasta, veggies, meat, rice\par S: does not snack \par Exercise: a lot of walking at work, a lot of physical activity at work -lifts weights in AM \par Smoking: A pack of cigarettes every few days\par \par HLD\par -Atorvastatin 40 mg daily\par -Need updated lipid panel\par \par HTN\par -BP in office 128/86\par -Lisinopril 2.5 mg daily\par \par Vit D Def\par -Currently taking 50,000 IU weekly supplement\par -Need updated levels

## 2021-05-04 NOTE — ASSESSMENT
[FreeTextEntry1] : T2DM\par -Improvement noted with patient taking insulin as prescribed on recall however no data present today.Pt needs to continue to check BS 2-3 x a day and do fasting labs.\par -Continue current medication regimen, appears he is doing well with Ozempic\par -Pt interested in CDE in the future but feels he knows what he has to do. \par -Increase exercise as tolerated, goal of 30 mins a day 5x a week\par -Make apts with ophtho, podiatry for yearly evaluation\par \par \par HLD\par -Continue statin, fasting lipid panel at goal\par \par HTN\par -BP stable in office, continue aceI\par \par Vit D Def\par -Continue weekly 50,000 IU supplement\par \par Fasting labs needed before next visit\par RTO 6 weeks, must bring logs or meter to next visit

## 2021-07-27 NOTE — DISCHARGE NOTE ADULT - FUNCTIONAL SCREEN CURRENT LEVEL: DRESSING, MLM
Pre-Op call completed, unable to reach patient.    Instructions for procedure given via voicemail.    Medications list in chart reviewed.  Instructed patient to hold all medications am of surgery except the following medications if REGULARLY take in AM: baclofen, effexor, and topamax .      Patient instructed to arrive for surgery at 1100 on 07/29/21 at [facility].    Instructed patient to call back with any questions or concerns - call back number [phone] given.    Patient instructed to bring in current list of medications (name of medication, dose and frequency of daily use) day of surgery.         Pre-op Teaching Completed:    OR - Procedure, OR - Time and time of arrival, Location of Registration, NPO, Medications to take Day of Surgery, Skin Prep, Equipment to bring day of surgery, Discharge Policy: Ride/Responsibile Adult and Instructions left via voicemail      Patient instructed to notify surgeon if patient has or develops any fever, cold or flu like symptoms, other signs of general illness, or any exposure to COVID19.  Patient also notified of current hospital visitor restrictions and hospital entrance screening.    PATIENT AWARE/NOTIFIED OF COVID TESTING TO BE COMPLETED PRIOR TO SURGERY.  PATIENT INSTRUCTED TO SELF QUARANTINE FROM TIME OF TESTING UNTIL SURGERY, IF PATIENT IS UNABLE TO QUARANTINE PATIENT MUST CONTINUE TO WEAR A MASK, PRACTICE SOCIAL DISTANCING AND PERFORM GOOD HAND HYGIENE.  Patient also notified if test not completed, surgery will need to be rescheduled.    
(0) independent

## 2021-12-04 ENCOUNTER — EMERGENCY (EMERGENCY)
Facility: HOSPITAL | Age: 51
LOS: 1 days | Discharge: HOME | End: 2021-12-04
Attending: EMERGENCY MEDICINE
Payer: COMMERCIAL

## 2021-12-04 VITALS
OXYGEN SATURATION: 99 % | HEART RATE: 80 BPM | RESPIRATION RATE: 22 BRPM | TEMPERATURE: 98 F | HEIGHT: 70 IN | SYSTOLIC BLOOD PRESSURE: 108 MMHG | DIASTOLIC BLOOD PRESSURE: 74 MMHG

## 2021-12-04 VITALS
HEIGHT: 70 IN | TEMPERATURE: 98 F | RESPIRATION RATE: 24 BRPM | HEART RATE: 99 BPM | DIASTOLIC BLOOD PRESSURE: 72 MMHG | WEIGHT: 270.07 LBS | OXYGEN SATURATION: 97 % | SYSTOLIC BLOOD PRESSURE: 111 MMHG

## 2021-12-04 LAB
ACETONE SERPL-MCNC: NEGATIVE — SIGNIFICANT CHANGE UP
ALBUMIN SERPL ELPH-MCNC: 4.2 G/DL — SIGNIFICANT CHANGE UP (ref 3.3–5.2)
ALBUMIN SERPL ELPH-MCNC: 4.3 G/DL — SIGNIFICANT CHANGE UP (ref 3.3–5.2)
ALBUMIN SERPL ELPH-MCNC: 4.5 G/DL — SIGNIFICANT CHANGE UP (ref 3.3–5.2)
ALP SERPL-CCNC: 127 U/L — HIGH (ref 40–120)
ALP SERPL-CCNC: 130 U/L — HIGH (ref 40–120)
ALP SERPL-CCNC: 143 U/L — HIGH (ref 40–120)
ALT FLD-CCNC: 23 U/L — SIGNIFICANT CHANGE UP
ALT FLD-CCNC: 23 U/L — SIGNIFICANT CHANGE UP
ALT FLD-CCNC: 25 U/L — SIGNIFICANT CHANGE UP
ANION GAP SERPL CALC-SCNC: 14 MMOL/L — SIGNIFICANT CHANGE UP (ref 5–17)
ANION GAP SERPL CALC-SCNC: 16 MMOL/L — SIGNIFICANT CHANGE UP (ref 5–17)
ANION GAP SERPL CALC-SCNC: 18 MMOL/L — HIGH (ref 5–17)
APTT BLD: 26.9 SEC — LOW (ref 27.5–35.5)
AST SERPL-CCNC: 14 U/L — SIGNIFICANT CHANGE UP
AST SERPL-CCNC: 16 U/L — SIGNIFICANT CHANGE UP
AST SERPL-CCNC: 24 U/L — SIGNIFICANT CHANGE UP
BASE EXCESS BLDV CALC-SCNC: -3.3 MMOL/L — LOW (ref -2–3)
BASOPHILS # BLD AUTO: 0.02 K/UL — SIGNIFICANT CHANGE UP (ref 0–0.2)
BASOPHILS # BLD AUTO: 0.03 K/UL — SIGNIFICANT CHANGE UP (ref 0–0.2)
BASOPHILS NFR BLD AUTO: 0.5 % — SIGNIFICANT CHANGE UP (ref 0–2)
BASOPHILS NFR BLD AUTO: 0.5 % — SIGNIFICANT CHANGE UP (ref 0–2)
BILIRUB SERPL-MCNC: 0.4 MG/DL — SIGNIFICANT CHANGE UP (ref 0.4–2)
BILIRUB SERPL-MCNC: 0.5 MG/DL — SIGNIFICANT CHANGE UP (ref 0.4–2)
BILIRUB SERPL-MCNC: 0.5 MG/DL — SIGNIFICANT CHANGE UP (ref 0.4–2)
BUN SERPL-MCNC: 18.9 MG/DL — SIGNIFICANT CHANGE UP (ref 8–20)
BUN SERPL-MCNC: 21.5 MG/DL — HIGH (ref 8–20)
BUN SERPL-MCNC: 23.5 MG/DL — HIGH (ref 8–20)
CA-I SERPL-SCNC: 1.17 MMOL/L — SIGNIFICANT CHANGE UP (ref 1.15–1.33)
CALCIUM SERPL-MCNC: 9.3 MG/DL — SIGNIFICANT CHANGE UP (ref 8.6–10.2)
CALCIUM SERPL-MCNC: 9.4 MG/DL — SIGNIFICANT CHANGE UP (ref 8.6–10.2)
CALCIUM SERPL-MCNC: 9.9 MG/DL — SIGNIFICANT CHANGE UP (ref 8.6–10.2)
CHLORIDE BLDV-SCNC: 98 MMOL/L — SIGNIFICANT CHANGE UP (ref 98–107)
CHLORIDE SERPL-SCNC: 95 MMOL/L — LOW (ref 98–107)
CHLORIDE SERPL-SCNC: 95 MMOL/L — LOW (ref 98–107)
CHLORIDE SERPL-SCNC: 97 MMOL/L — LOW (ref 98–107)
CO2 SERPL-SCNC: 18 MMOL/L — LOW (ref 22–29)
CO2 SERPL-SCNC: 20 MMOL/L — LOW (ref 22–29)
CO2 SERPL-SCNC: 22 MMOL/L — SIGNIFICANT CHANGE UP (ref 22–29)
CREAT SERPL-MCNC: 1.19 MG/DL — SIGNIFICANT CHANGE UP (ref 0.5–1.3)
CREAT SERPL-MCNC: 1.29 MG/DL — SIGNIFICANT CHANGE UP (ref 0.5–1.3)
CREAT SERPL-MCNC: 1.43 MG/DL — HIGH (ref 0.5–1.3)
EOSINOPHIL # BLD AUTO: 0.04 K/UL — SIGNIFICANT CHANGE UP (ref 0–0.5)
EOSINOPHIL # BLD AUTO: 0.05 K/UL — SIGNIFICANT CHANGE UP (ref 0–0.5)
EOSINOPHIL NFR BLD AUTO: 0.9 % — SIGNIFICANT CHANGE UP (ref 0–6)
EOSINOPHIL NFR BLD AUTO: 1 % — SIGNIFICANT CHANGE UP (ref 0–6)
GAS PNL BLDV: 130 MMOL/L — LOW (ref 136–145)
GAS PNL BLDV: SIGNIFICANT CHANGE UP
GLUCOSE BLDV-MCNC: 639 MG/DL — CRITICAL HIGH (ref 70–99)
GLUCOSE SERPL-MCNC: 404 MG/DL — HIGH (ref 70–99)
GLUCOSE SERPL-MCNC: 558 MG/DL — CRITICAL HIGH (ref 70–99)
GLUCOSE SERPL-MCNC: 608 MG/DL — CRITICAL HIGH (ref 70–99)
HADV DNA SPEC QL NAA+PROBE: DETECTED
HCO3 BLDV-SCNC: 20 MMOL/L — LOW (ref 22–29)
HCT VFR BLD CALC: 43.3 % — SIGNIFICANT CHANGE UP (ref 39–50)
HCT VFR BLD CALC: 46.3 % — SIGNIFICANT CHANGE UP (ref 39–50)
HCT VFR BLDA CALC: 49 % — SIGNIFICANT CHANGE UP (ref 39–51)
HGB BLD CALC-MCNC: 16.3 G/DL — SIGNIFICANT CHANGE UP (ref 12.6–17.4)
HGB BLD-MCNC: 14.7 G/DL — SIGNIFICANT CHANGE UP (ref 13–17)
HGB BLD-MCNC: 16.2 G/DL — SIGNIFICANT CHANGE UP (ref 13–17)
IMM GRANULOCYTES NFR BLD AUTO: 0 % — SIGNIFICANT CHANGE UP (ref 0–1.5)
IMM GRANULOCYTES NFR BLD AUTO: 0.2 % — SIGNIFICANT CHANGE UP (ref 0–1.5)
INR BLD: 0.94 RATIO — SIGNIFICANT CHANGE UP (ref 0.88–1.16)
LACTATE BLDV-MCNC: 2.2 MMOL/L — HIGH (ref 0.5–2)
LIDOCAIN IGE QN: 221 U/L — HIGH (ref 22–51)
LYMPHOCYTES # BLD AUTO: 1.11 K/UL — SIGNIFICANT CHANGE UP (ref 1–3.3)
LYMPHOCYTES # BLD AUTO: 1.44 K/UL — SIGNIFICANT CHANGE UP (ref 1–3.3)
LYMPHOCYTES # BLD AUTO: 25.4 % — SIGNIFICANT CHANGE UP (ref 13–44)
LYMPHOCYTES # BLD AUTO: 27 % — SIGNIFICANT CHANGE UP (ref 13–44)
MCHC RBC-ENTMCNC: 29.6 PG — SIGNIFICANT CHANGE UP (ref 27–34)
MCHC RBC-ENTMCNC: 29.8 PG — SIGNIFICANT CHANGE UP (ref 27–34)
MCHC RBC-ENTMCNC: 33.9 GM/DL — SIGNIFICANT CHANGE UP (ref 32–36)
MCHC RBC-ENTMCNC: 35 GM/DL — SIGNIFICANT CHANGE UP (ref 32–36)
MCV RBC AUTO: 85.3 FL — SIGNIFICANT CHANGE UP (ref 80–100)
MCV RBC AUTO: 87.3 FL — SIGNIFICANT CHANGE UP (ref 80–100)
MONOCYTES # BLD AUTO: 0.32 K/UL — SIGNIFICANT CHANGE UP (ref 0–0.9)
MONOCYTES # BLD AUTO: 0.36 K/UL — SIGNIFICANT CHANGE UP (ref 0–0.9)
MONOCYTES NFR BLD AUTO: 6.3 % — SIGNIFICANT CHANGE UP (ref 2–14)
MONOCYTES NFR BLD AUTO: 7.8 % — SIGNIFICANT CHANGE UP (ref 2–14)
NEUTROPHILS # BLD AUTO: 2.62 K/UL — SIGNIFICANT CHANGE UP (ref 1.8–7.4)
NEUTROPHILS # BLD AUTO: 3.79 K/UL — SIGNIFICANT CHANGE UP (ref 1.8–7.4)
NEUTROPHILS NFR BLD AUTO: 63.7 % — SIGNIFICANT CHANGE UP (ref 43–77)
NEUTROPHILS NFR BLD AUTO: 66.7 % — SIGNIFICANT CHANGE UP (ref 43–77)
PCO2 BLDV: 28 MMHG — LOW (ref 42–55)
PH BLDV: 7.47 — HIGH (ref 7.32–7.43)
PLATELET # BLD AUTO: 220 K/UL — SIGNIFICANT CHANGE UP (ref 150–400)
PLATELET # BLD AUTO: 236 K/UL — SIGNIFICANT CHANGE UP (ref 150–400)
PO2 BLDV: 114 MMHG — HIGH (ref 25–45)
POTASSIUM BLDV-SCNC: 4.8 MMOL/L — SIGNIFICANT CHANGE UP (ref 3.5–5.1)
POTASSIUM SERPL-MCNC: 4.8 MMOL/L — SIGNIFICANT CHANGE UP (ref 3.5–5.3)
POTASSIUM SERPL-MCNC: 5 MMOL/L — SIGNIFICANT CHANGE UP (ref 3.5–5.3)
POTASSIUM SERPL-MCNC: 5.4 MMOL/L — HIGH (ref 3.5–5.3)
POTASSIUM SERPL-SCNC: 4.8 MMOL/L — SIGNIFICANT CHANGE UP (ref 3.5–5.3)
POTASSIUM SERPL-SCNC: 5 MMOL/L — SIGNIFICANT CHANGE UP (ref 3.5–5.3)
POTASSIUM SERPL-SCNC: 5.4 MMOL/L — HIGH (ref 3.5–5.3)
PROT SERPL-MCNC: 8 G/DL — SIGNIFICANT CHANGE UP (ref 6.6–8.7)
PROT SERPL-MCNC: 8.6 G/DL — SIGNIFICANT CHANGE UP (ref 6.6–8.7)
PROT SERPL-MCNC: 8.7 G/DL — SIGNIFICANT CHANGE UP (ref 6.6–8.7)
PROTHROM AB SERPL-ACNC: 10.9 SEC — SIGNIFICANT CHANGE UP (ref 10.6–13.6)
RAPID RVP RESULT: DETECTED
RBC # BLD: 4.96 M/UL — SIGNIFICANT CHANGE UP (ref 4.2–5.8)
RBC # BLD: 5.43 M/UL — SIGNIFICANT CHANGE UP (ref 4.2–5.8)
RBC # FLD: 12.1 % — SIGNIFICANT CHANGE UP (ref 10.3–14.5)
RBC # FLD: 12.2 % — SIGNIFICANT CHANGE UP (ref 10.3–14.5)
SAO2 % BLDV: 99.3 % — SIGNIFICANT CHANGE UP
SARS-COV-2 RNA SPEC QL NAA+PROBE: SIGNIFICANT CHANGE UP
SODIUM SERPL-SCNC: 131 MMOL/L — LOW (ref 135–145)
SODIUM SERPL-SCNC: 131 MMOL/L — LOW (ref 135–145)
SODIUM SERPL-SCNC: 133 MMOL/L — LOW (ref 135–145)
TROPONIN T SERPL-MCNC: <0.01 NG/ML — SIGNIFICANT CHANGE UP (ref 0–0.06)
WBC # BLD: 4.11 K/UL — SIGNIFICANT CHANGE UP (ref 3.8–10.5)
WBC # BLD: 5.68 K/UL — SIGNIFICANT CHANGE UP (ref 3.8–10.5)
WBC # FLD AUTO: 4.11 K/UL — SIGNIFICANT CHANGE UP (ref 3.8–10.5)
WBC # FLD AUTO: 5.68 K/UL — SIGNIFICANT CHANGE UP (ref 3.8–10.5)

## 2021-12-04 PROCEDURE — 85018 HEMOGLOBIN: CPT

## 2021-12-04 PROCEDURE — 83605 ASSAY OF LACTIC ACID: CPT

## 2021-12-04 PROCEDURE — 0225U NFCT DS DNA&RNA 21 SARSCOV2: CPT

## 2021-12-04 PROCEDURE — 93010 ELECTROCARDIOGRAM REPORT: CPT

## 2021-12-04 PROCEDURE — 99284 EMERGENCY DEPT VISIT MOD MDM: CPT

## 2021-12-04 PROCEDURE — 96375 TX/PRO/DX INJ NEW DRUG ADDON: CPT

## 2021-12-04 PROCEDURE — 93005 ELECTROCARDIOGRAM TRACING: CPT

## 2021-12-04 PROCEDURE — 82330 ASSAY OF CALCIUM: CPT

## 2021-12-04 PROCEDURE — 74177 CT ABD & PELVIS W/CONTRAST: CPT | Mod: ME

## 2021-12-04 PROCEDURE — 85025 COMPLETE CBC W/AUTO DIFF WBC: CPT

## 2021-12-04 PROCEDURE — 82803 BLOOD GASES ANY COMBINATION: CPT

## 2021-12-04 PROCEDURE — G1004: CPT

## 2021-12-04 PROCEDURE — 84132 ASSAY OF SERUM POTASSIUM: CPT

## 2021-12-04 PROCEDURE — 96374 THER/PROPH/DIAG INJ IV PUSH: CPT | Mod: XU

## 2021-12-04 PROCEDURE — 36415 COLL VENOUS BLD VENIPUNCTURE: CPT

## 2021-12-04 PROCEDURE — 85014 HEMATOCRIT: CPT

## 2021-12-04 PROCEDURE — 84484 ASSAY OF TROPONIN QUANT: CPT

## 2021-12-04 PROCEDURE — 99284 EMERGENCY DEPT VISIT MOD MDM: CPT | Mod: 25

## 2021-12-04 PROCEDURE — 82947 ASSAY GLUCOSE BLOOD QUANT: CPT

## 2021-12-04 PROCEDURE — 99285 EMERGENCY DEPT VISIT HI MDM: CPT

## 2021-12-04 PROCEDURE — 84295 ASSAY OF SERUM SODIUM: CPT

## 2021-12-04 PROCEDURE — 82962 GLUCOSE BLOOD TEST: CPT

## 2021-12-04 PROCEDURE — 80053 COMPREHEN METABOLIC PANEL: CPT

## 2021-12-04 PROCEDURE — 82435 ASSAY OF BLOOD CHLORIDE: CPT

## 2021-12-04 PROCEDURE — 83690 ASSAY OF LIPASE: CPT

## 2021-12-04 PROCEDURE — 74177 CT ABD & PELVIS W/CONTRAST: CPT | Mod: 26,ME

## 2021-12-04 RX ORDER — SODIUM CHLORIDE 9 MG/ML
1000 INJECTION INTRAMUSCULAR; INTRAVENOUS; SUBCUTANEOUS ONCE
Refills: 0 | Status: COMPLETED | OUTPATIENT
Start: 2021-12-04 | End: 2021-12-04

## 2021-12-04 RX ORDER — PANTOPRAZOLE SODIUM 20 MG/1
80 TABLET, DELAYED RELEASE ORAL ONCE
Refills: 0 | Status: DISCONTINUED | OUTPATIENT
Start: 2021-12-04 | End: 2021-12-04

## 2021-12-04 RX ORDER — INSULIN HUMAN 100 [IU]/ML
10 INJECTION, SOLUTION SUBCUTANEOUS ONCE
Refills: 0 | Status: COMPLETED | OUTPATIENT
Start: 2021-12-04 | End: 2021-12-04

## 2021-12-04 RX ORDER — PANTOPRAZOLE SODIUM 20 MG/1
1 TABLET, DELAYED RELEASE ORAL
Qty: 30 | Refills: 0
Start: 2021-12-04 | End: 2022-01-02

## 2021-12-04 RX ORDER — SODIUM CHLORIDE 9 MG/ML
1000 INJECTION, SOLUTION INTRAVENOUS ONCE
Refills: 0 | Status: COMPLETED | OUTPATIENT
Start: 2021-12-04 | End: 2021-12-04

## 2021-12-04 RX ORDER — ONDANSETRON 8 MG/1
4 TABLET, FILM COATED ORAL ONCE
Refills: 0 | Status: COMPLETED | OUTPATIENT
Start: 2021-12-04 | End: 2021-12-04

## 2021-12-04 RX ORDER — INSULIN LISPRO 100/ML
10 VIAL (ML) SUBCUTANEOUS ONCE
Refills: 0 | Status: COMPLETED | OUTPATIENT
Start: 2021-12-04 | End: 2021-12-04

## 2021-12-04 RX ORDER — SODIUM CHLORIDE 9 MG/ML
3 INJECTION INTRAMUSCULAR; INTRAVENOUS; SUBCUTANEOUS ONCE
Refills: 0 | Status: COMPLETED | OUTPATIENT
Start: 2021-12-04 | End: 2021-12-04

## 2021-12-04 RX ORDER — PANTOPRAZOLE SODIUM 20 MG/1
40 TABLET, DELAYED RELEASE ORAL ONCE
Refills: 0 | Status: COMPLETED | OUTPATIENT
Start: 2021-12-04 | End: 2021-12-04

## 2021-12-04 RX ADMIN — SODIUM CHLORIDE 3 MILLILITER(S): 9 INJECTION INTRAMUSCULAR; INTRAVENOUS; SUBCUTANEOUS at 20:07

## 2021-12-04 RX ADMIN — SODIUM CHLORIDE 1000 MILLILITER(S): 9 INJECTION INTRAMUSCULAR; INTRAVENOUS; SUBCUTANEOUS at 20:07

## 2021-12-04 RX ADMIN — INSULIN HUMAN 10 UNIT(S): 100 INJECTION, SOLUTION SUBCUTANEOUS at 22:23

## 2021-12-04 RX ADMIN — Medication 10 UNIT(S): at 09:01

## 2021-12-04 RX ADMIN — SODIUM CHLORIDE 1000 MILLILITER(S): 9 INJECTION, SOLUTION INTRAVENOUS at 08:17

## 2021-12-04 RX ADMIN — SODIUM CHLORIDE 1000 MILLILITER(S): 9 INJECTION INTRAMUSCULAR; INTRAVENOUS; SUBCUTANEOUS at 22:22

## 2021-12-04 RX ADMIN — SODIUM CHLORIDE 1000 MILLILITER(S): 9 INJECTION INTRAMUSCULAR; INTRAVENOUS; SUBCUTANEOUS at 21:26

## 2021-12-04 RX ADMIN — ONDANSETRON 4 MILLIGRAM(S): 8 TABLET, FILM COATED ORAL at 08:17

## 2021-12-04 RX ADMIN — INSULIN HUMAN 10 UNIT(S): 100 INJECTION, SOLUTION SUBCUTANEOUS at 20:06

## 2021-12-04 RX ADMIN — ONDANSETRON 4 MILLIGRAM(S): 8 TABLET, FILM COATED ORAL at 20:06

## 2021-12-04 RX ADMIN — PANTOPRAZOLE SODIUM 40 MILLIGRAM(S): 20 TABLET, DELAYED RELEASE ORAL at 09:01

## 2021-12-04 RX ADMIN — SODIUM CHLORIDE 1000 MILLILITER(S): 9 INJECTION, SOLUTION INTRAVENOUS at 09:02

## 2021-12-04 NOTE — ED ADULT TRIAGE NOTE - CHIEF COMPLAINT QUOTE
pt presents with elevated blood sugar. pt recently seen and d/c'd from ED for same sx. FS reading too high for glucometer at this time. pt reports lethargy, increased thirst, N/V.

## 2021-12-04 NOTE — ED PROVIDER NOTE - OBJECTIVE STATEMENT
50 y/o male with PMHx of HLD, HTN, IDDM c/o hyperglycemia. Pt was seen here earlier today for similar complaint. Pt states after he left here he went home, took his insulin and feels nauseas, constipated and SOB. Pt states he has been in DKA before. Pt vaccinated against covid.

## 2021-12-04 NOTE — ED ADULT TRIAGE NOTE - CHIEF COMPLAINT QUOTE
Pt with hx of DKA states 2 days no insulin and glucose is high. Pt with abd pain, N/V and tachypnea.

## 2021-12-04 NOTE — ED PROVIDER NOTE - OBJECTIVE STATEMENT
50 y/o M hx of DM on 50U insulin basal, 12U before meals, prior cardiac cath no stents, presents w/ hyperglycemia for the past several days. states fingers tick has been > 500 at home and has ran out of his insulin for 2 days. states insulin is ready at pharmacy but has not been able to pick it up. endorses abdominal discomfort. endorses nausea w/ 2 episodes of non-bilious, non-bloody emesis. denies fever/chills. denies chest pain/sob. denies dysuria. Denies changes in bowel movements. denies trauma.

## 2021-12-04 NOTE — ED PROVIDER NOTE - NSFOLLOWUPINSTRUCTIONS_ED_ALL_ED_FT
Pancreatitis    WHAT YOU NEED TO KNOW:    Pancreatitis is inflammation of your pancreas. The pancreas is an organ that makes insulin. It also makes enzymes (digestive juices) that help your body digest food. Pancreatitis may be an acute (short-term) problem that happens only once. It may become a chronic (long-term) problem that comes and goes over time.    Pancreas         DISCHARGE INSTRUCTIONS:    Call your doctor if:   •You have severe pain in your abdomen and you are vomiting.      •You have a fever.       •You continue to lose weight without trying.      •Your skin or the whites of your eyes turn yellow.      •You have questions or concerns about your condition or care.      Medicines: You may need any of the following:   •Prescription pain medicine may be given. Ask your healthcare provider how to take this medicine safely. Some prescription pain medicines contain acetaminophen. Do not take other medicines that contain acetaminophen without talking to your healthcare provider. Too much acetaminophen may cause liver damage. Prescription pain medicine may cause constipation. Ask your healthcare provider how to prevent or treat constipation.       •Antibiotics may be given to treat a bacterial infection.      •Take your medicine as directed. Contact your healthcare provider if you think your medicine is not helping or if you have side effects. Tell him or her if you are allergic to any medicine. Keep a list of the medicines, vitamins, and herbs you take. Include the amounts, and when and why you take them. Bring the list or the pill bottles to follow-up visits. Carry your medicine list with you in case of an emergency.      Self-care:   •Rest when you feel it is needed. Slowly start to do more each day. Return to your usual activities as directed.      •Do not drink any alcohol. If you need help to stop drinking, contact the following organization:   ?Alcoholics Anonymous  Web Address: http://www.aa.org          •Ask your healthcare provider or dietitian about the best foods to eat. You may need to eat foods that are low in fat if you have chronic pancreatitis.      •Do not smoke. Nicotine and other chemicals in cigarettes and cigars can cause damage. Ask your healthcare provider for information if you currently smoke and need help to quit. E-cigarettes or smokeless tobacco still contain nicotine. Talk to your healthcare provider before you use these products.       Follow up with your doctor as directed: Write down your questions so you remember to ask them during your visits.       Managing Diabetes During Sick Days    WHAT YOU NEED TO KNOW:    Sick day management is a plan you develop with your diabetes care team to control your blood sugar level when you are sick. Your blood sugar level can rise because of stress from illness, surgery, or injury. Your plan will help prevent high blood sugar levels and other serious health conditions.    DISCHARGE INSTRUCTIONS:    Have someone call your local emergency number (911 in the [place]) if:   •You have trouble breathing.      •You cannot be woken.      •You are drowsy or confused.      •You are breathing faster than usual.      Return to the emergency department if:   •You cannot keep food and liquids down at all for a few hours.      •You are drowsy or confused.      •You are breathing faster than usual.      •Your heartbeat is faster than usual, or your heart is pounding.      •You are weak or dizzy.      Call your doctor or diabetes care team if:   •You have leg cramps.      •Your mouth or eyes are dry.      •You are vomiting or have diarrhea.      •You have a fever.      •Your ketone level is higher than healthcare providers have told you it should be.      •Your blood sugar level is higher than healthcare providers have told you it should be.      •You have questions or concerns about your condition or care.      Medicines:   •Insulin or diabetes medicine help to keep your blood sugar under control. Your healthcare provider will tell you if you need to make changes to how you use your diabetes medicine or insulin.      •Take your medicine as directed. Contact your healthcare provider if you think your medicine is not helping or if you have side effects. Tell him of her if you are allergic to any medicine. Keep a list of the medicines, vitamins, and herbs you take. Include the amounts, and when and why you take them. Bring the list or the pill bottles to follow-up visits. Carry your medicine list with you in case of an emergency.      What to do during sick days:   •Continue to take your medicines as directed. Your healthcare provider will tell you if you need to make any changes. If you normally do not use insulin, you may need to use it while you are sick. If you already use insulin, you may need to increase the amount you take. Talk to your provider before you take any over-the-counter medicines.      •Check your blood sugar level more often than usual. You may need to check a drop of blood in a glucose test machine. If you have type 2 diabetes, check at least 4 times each day. If you have type 1 diabetes, check every 4 hours. Your care team provider may recommend a continuous glucose monitor (CGM). A CGM is a device that is worn at all times. The CGM checks your blood sugar every 5 minutes. It sends results to an electronic device such as a smart phone.   How to check your blood sugar       Continuous Glucose Monitoring            •Check your urine or blood for ketones if you use insulin, and as directed. Ask your provider which type of ketone testing is best for you. Ketone urine test kits are sold in pharmacies and some stores. You can also buy a meter to check the amount of ketones in your blood. Ask when and how often to check ketones.      •Drink liquids as directed. You may need to drink about 8 ounces (1 cup) of liquid each hour. Drink liquids that do not contain sugar or caffeine. Ask your provider which liquids are best for you. He or she may tell you that water is the best liquid to drink.      •Follow your usual meal plan as closely as possible. If you cannot follow your meal plan, eat other foods that are easy for your body to digest. If you are eating less food than normal or cannot eat any foods, drink liquids that contain calories.  Plate Method           •Tell others who help you while you are sick about your sick day plan. Put your plan in a place that is easy to find. Your sick day plan may change over time based on your needs.      What to drink and eat while you are sick: Prepare for sick days by having a small amount of non-diet drinks and foods at home. Have about 50 grams of carbohydrates every 3 to 4 hours for upset stomach, vomiting, or diarrhea. Each of the liquids and foods listed below has about 10 to 15 grams of carbohydrate.   •Liquids: ?? to ½ cup of fruit juice      ?½ cup of regular soda      ?1 cup of milk      ?1 double-stick popsicle      ?1 cup of a sports drink      ?½ cup of cream soup      •Foods: ?½ cup of regular gelatin      ? ¼ cup of regular pudding      ?½ cup of mashed potatoes, macaroni, or noodles      ?½ cup of regular ice cream or ¼ cup of sherbet      ?1 slice of dry toast, 6 saltine crackers, or 3 mono crackers        Follow up with your doctor or diabetes care team as directed: Write down your questions so you remember to ask them during your visits.

## 2021-12-04 NOTE — ED PROVIDER NOTE - NSFOLLOWUPINSTRUCTIONS_ED_ALL_ED_FT
Continue your insulin regimen as instructed  Check your blood sugar before each meal and at bedtime  Follow up with your doctor tomorrow  Return sooner for any problem      Hyperglycemia    Hyperglycemia occurs when the glucose (sugar) level in your blood is too high. Symptoms include increased urination, increased thirst, a dry mouth, or changes in appetite. If started on a medication, take exactly as prescribed by your health care professional. Maintain a healthy lifestyle and follow up with your primary care physician.    SEEK IMMEDIATE MEDICAL CARE IF YOU HAVE ANY OF THE FOLLOWING SYMPTOMS: shortness of breath, change in mental status, nausea or vomiting, fruity smell to your breath, or any signs of dehydration.

## 2021-12-04 NOTE — ED PROVIDER NOTE - PHYSICAL EXAMINATION
General: Well appearing male in no acute distress  HEENT: Normocephalic, atraumatic. Moist mucous membranes. Oropharynx clear. No lymphadenopathy.  Eyes: No scleral icterus. EOMI. FREDDY.  Neck:. Soft and supple. Full ROM without pain. No midline tenderness  Cardiac: Regular rate and regular rhythm. No murmurs, rubs, gallops. Peripheral pulses 2+ and symmetric. No LE edema.  Resp: Lungs CTAB. Speaking in full sentences. No wheezes, rales or rhonchi.  Abd: Soft, non-tender, non-distended. No guarding or rebound. No scars, masses, or lesions.  Back: Spine midline and non-tender. No CVA tenderness.    Skin: No rashes, abrasions, or lacerations.  Neuro: AO x 3. Moves all extremities symmetrically. Motor strength and sensation grossly intact.

## 2021-12-04 NOTE — ED PROVIDER NOTE - PATIENT PORTAL LINK FT
You can access the FollowMyHealth Patient Portal offered by [facility] by registering at the following website: [email or web address]. By joining [facility]’s FollowMyHealth portal, you will also be able to view your health information using other applications (apps) compatible with our system.

## 2021-12-04 NOTE — ED PROVIDER NOTE - CLINICAL SUMMARY MEDICAL DECISION MAKING FREE TEXT BOX
52 y/o M hx of DM on 50U insulin basal, 12U before meals, prior cardiac cath no stents, presents w/ hyperglycemia for the past several days.   finger stick > 500 today and last several days at home. HR in the 90s. has not taken his insulin for last several days, likely hyperglycemia due to medication non-compliance, states he has medications ready at pharmacy but has not picked up.   appears comfortable, low suspicion for DKA but will r/o. treat for hyperglycemia w/ fluid boluses   vbg w/ lytes, acetone, cmp  ekg, trop - r/o atypical ischemia, low suspicion   zofran

## 2021-12-04 NOTE — ED ADULT NURSE NOTE - COVID-19 RESULT
I relayed the message to mother  She said the child had PT in the past 3-4 times  I told her the letter could not be written by our Dr the Orthopedic would have to determine the plan of care  Mother will relay the info to her     USED isocket for call NEGATIVE

## 2021-12-04 NOTE — ED PROVIDER NOTE - ATTENDING CONTRIBUTION TO CARE
I personally saw the patient with the resident, and completed the key components of the history and physical exam. I then discussed the management plan with the resident.    52 y/o M with PMH DM presents for hypergylcemia for several days, feeling generally weak, abdominal pain, nausea, increased urination. He also describes "acid reflux", worse with lying down, that is worse than usual - does not usually take anything for GERD. He ran out of his Basiglar of which he usually takes 50 U qhs 2 days ago, but has been taking his short acting insulin as well as his blood pressure and cholesterol medication.    AP - NAD, well appearing, RRR, lungs CTA B/L, abd soft, mild epigastric TTP without guarding, no LE edema, 2+ symmetrical pulses.    Will check DKA work up, fluids, glucose control and reassess.

## 2021-12-04 NOTE — ED PROVIDER NOTE - PATIENT PORTAL LINK FT
You can access the FollowMyHealth Patient Portal offered by [facility] by registering at the following website: [email or web address]. By joining [facility]’s FollowMyHealth portal, you will also be able to view your health information using other applications (apps) compatible with our system.

## 2021-12-04 NOTE — ED ADULT NURSE NOTE - PAIN RATING/NUMBER SCALE (0-10): REST
Spine surgery folder, Chlorhexidine gluconate liquid soap and instructions given and reviewed. Consent reviewed and signed.  Pre-op MSSA/MRSA swab performed and patient will be notified of results in 1-2 business days. Patient verbalizes understanding and will call with any further questions or concerns. Patient will need re-enforcement teaching at time of surgery.    4

## 2021-12-04 NOTE — ED PROVIDER NOTE - CARE PLAN
Principal Discharge DX:	Hyperglycemia without ketosis  Secondary Diagnosis:	Uncontrolled diabetes mellitus   1

## 2021-12-04 NOTE — ED ADULT NURSE NOTE - OBJECTIVE STATEMENT
Patient presents to ER C/O abdominal pain, nausea and vomiting, onset of symptoms 3 days ago, patient with HX of DM with previous admissions due to DKA, states ran out of insulin 2 days ago, BG at home in the 500's, resp even/labored, denies fever

## 2021-12-04 NOTE — ED ADULT NURSE NOTE - OBJECTIVE STATEMENT
A&Ox4, RR even and unlabored. Skin is warm and dry. PT C/O of hyperglycemia, N/V, increased thirst and urinary frequency.  Pt was seen here earlier today fo same complaint and was discharged home. returned back to Ed with BS greater [id number] of glucometer. PT recently ran out of insulin for a few days.

## 2021-12-04 NOTE — ED PROVIDER NOTE - NSICDXPASTMEDICALHX_GEN_ALL_CORE_FT
PAST MEDICAL HISTORY:  Blurry vision     Diabetes       HLD (hyperlipidemia)     HTN (hypertension)

## 2021-12-04 NOTE — ED PROVIDER NOTE - PROGRESS NOTE DETAILS
Citarrella: Patient feels better, tolerating PO, would like to go home. Strict return precautions given regarding pancreatitis and hyperglycemia.

## 2021-12-05 VITALS
SYSTOLIC BLOOD PRESSURE: 118 MMHG | RESPIRATION RATE: 20 BRPM | OXYGEN SATURATION: 97 % | DIASTOLIC BLOOD PRESSURE: 70 MMHG | TEMPERATURE: 98 F | HEART RATE: 64 BPM

## 2021-12-05 LAB
ANION GAP SERPL CALC-SCNC: 11 MMOL/L — SIGNIFICANT CHANGE UP (ref 5–17)
BUN SERPL-MCNC: 15.2 MG/DL — SIGNIFICANT CHANGE UP (ref 8–20)
CALCIUM SERPL-MCNC: 8.2 MG/DL — LOW (ref 8.6–10.2)
CHLORIDE SERPL-SCNC: 105 MMOL/L — SIGNIFICANT CHANGE UP (ref 98–107)
CO2 SERPL-SCNC: 22 MMOL/L — SIGNIFICANT CHANGE UP (ref 22–29)
CREAT SERPL-MCNC: 1.13 MG/DL — SIGNIFICANT CHANGE UP (ref 0.5–1.3)
GLUCOSE SERPL-MCNC: 274 MG/DL — HIGH (ref 70–99)
LACTATE BLDV-MCNC: 1.3 MMOL/L — SIGNIFICANT CHANGE UP (ref 0.5–2)
POTASSIUM SERPL-MCNC: 3.9 MMOL/L — SIGNIFICANT CHANGE UP (ref 3.5–5.3)
POTASSIUM SERPL-SCNC: 3.9 MMOL/L — SIGNIFICANT CHANGE UP (ref 3.5–5.3)
SODIUM SERPL-SCNC: 138 MMOL/L — SIGNIFICANT CHANGE UP (ref 135–145)

## 2021-12-05 PROCEDURE — 85610 PROTHROMBIN TIME: CPT

## 2021-12-05 PROCEDURE — 96374 THER/PROPH/DIAG INJ IV PUSH: CPT

## 2021-12-05 PROCEDURE — 36415 COLL VENOUS BLD VENIPUNCTURE: CPT

## 2021-12-05 PROCEDURE — 85025 COMPLETE CBC W/AUTO DIFF WBC: CPT

## 2021-12-05 PROCEDURE — 96361 HYDRATE IV INFUSION ADD-ON: CPT

## 2021-12-05 PROCEDURE — 82009 KETONE BODYS QUAL: CPT

## 2021-12-05 PROCEDURE — 83605 ASSAY OF LACTIC ACID: CPT

## 2021-12-05 PROCEDURE — 99284 EMERGENCY DEPT VISIT MOD MDM: CPT | Mod: 25

## 2021-12-05 PROCEDURE — 96375 TX/PRO/DX INJ NEW DRUG ADDON: CPT

## 2021-12-05 PROCEDURE — 82962 GLUCOSE BLOOD TEST: CPT

## 2021-12-05 PROCEDURE — 80053 COMPREHEN METABOLIC PANEL: CPT

## 2021-12-05 PROCEDURE — 85730 THROMBOPLASTIN TIME PARTIAL: CPT

## 2021-12-05 PROCEDURE — 96376 TX/PRO/DX INJ SAME DRUG ADON: CPT

## 2021-12-05 PROCEDURE — 80048 BASIC METABOLIC PNL TOTAL CA: CPT

## 2021-12-06 ENCOUNTER — APPOINTMENT (OUTPATIENT)
Dept: ENDOCRINOLOGY | Facility: CLINIC | Age: 51
End: 2021-12-06

## 2021-12-08 PROBLEM — I10 ESSENTIAL (PRIMARY) HYPERTENSION: Chronic | Status: ACTIVE | Noted: 2021-12-05

## 2021-12-08 PROBLEM — E78.5 HYPERLIPIDEMIA, UNSPECIFIED: Chronic | Status: ACTIVE | Noted: 2021-12-05

## 2022-02-13 NOTE — CONSULT NOTE ADULT - CONSULT REASON
Mild DKA
Uncontrolled DM
chest pain
PAST SURGICAL HISTORY:  Undescended testes repaired a few months ago

## 2022-03-08 ENCOUNTER — APPOINTMENT (OUTPATIENT)
Dept: ENDOCRINOLOGY | Facility: CLINIC | Age: 52
End: 2022-03-08

## 2022-03-14 NOTE — PROGRESS NOTE ADULT - PROVIDER SPECIALTY LIST ADULT
Family Medicine [facility] Otolaryngology  Dr. Magdaleno Wilkerson. Dakota Boggs. Ms.Ed        Patient Name:  Keyla Peralta  :  1974     CHIEF C/O:    Chief Complaint   Patient presents with    Follow-up     yearly hearing       HISTORY OBTAINED FROM:  patient    HISTORY OF PRESENT ILLNESS:       Bull Frances is a 50y.o. year old female, here today for follow up of patient seen and examined for known history of unilateral right-sided severe hearing loss with known history of left-sided sensorineural hearing loss. Patient denies any new changes in hearing loss, no current complaints of headaches or vision changes, no new complaints of tinnitus or vertigo. No complaints of fever chills or sore throat. Full audiogram was reviewed today with the patient today no significant changes from comparison from the year prior. Past Medical History:   Diagnosis Date    Brain tumor ([address]     19years old-not cancerous     Cerebral artery occlusion with cerebral infarction ([address]     Fibromyalgia     GERD (gastroesophageal reflux disease)     Hernia of abdominal wall     Hyperlipidemia      Past Surgical History:   Procedure Laterality Date    BRAIN SURGERY             Current Outpatient Medications:     hydrOXYzine (VISTARIL) 25 MG capsule, TAKE ONE CAPSULE BY MOUTH AT BEDTIME, Disp: , Rfl:     FLUoxetine (PROZAC) 10 MG capsule, Take 10 mg by mouth daily, Disp: , Rfl:     aspirin 81 MG tablet, Take 81 mg by mouth daily, Disp: , Rfl:     atorvastatin (LIPITOR) 40 MG tablet, TAKE ONE TABLET BY MOUTH EVERY DAY, Disp: , Rfl: 5    diazepam (VALIUM) 5 MG tablet, Take 5 mg by mouth every 6 hours as needed for Anxiety.  (Patient not taking: Reported on 3/14/2022), Disp: , Rfl:     meclizine (ANTIVERT) 25 MG tablet, Take 25 mg by mouth 3 times daily as needed (vertigo) (Patient not taking: Reported on 3/14/2022), Disp: , Rfl:   Bactrim [sulfamethoxazole-trimethoprim]  Social History     Tobacco Use    Smoking status: Never Smoker    Smokeless tobacco: Never Used   Substance Use Topics    Alcohol use: No    Drug use: Not Currently     History reviewed. No pertinent family history. Review of Systems   Constitutional: Negative for activity change, fatigue and fever. HENT: Positive for hearing loss. Negative for congestion, ear discharge, ear pain, nosebleeds, postnasal drip, rhinorrhea, sinus pressure, sinus pain, sore throat, tinnitus, trouble swallowing and voice change. Respiratory: Negative for cough, choking, wheezing and stridor. Cardiovascular: Negative for chest pain. Gastrointestinal: Negative. Genitourinary: Negative. Skin: Negative for color change and rash. Neurological: Negative for speech difficulty, light-headedness, numbness and headaches. Hematological: Negative for adenopathy. Psychiatric/Behavioral: Negative for behavioral problems. BP (!) 137/91   Pulse 92   Ht 5' 5\" (1.651 m)   Wt 217 lb (98.4 kg)   BMI 36.11 kg/m²   Physical Exam  Constitutional:       Appearance: Normal appearance. She is normal weight. HENT:      Head: Normocephalic and atraumatic. Right Ear: Tympanic membrane, ear canal and external ear normal. No drainage. There is impacted cerumen. Left Ear: Tympanic membrane, ear canal and external ear normal. No drainage. There is impacted cerumen. Nose: Nose normal. No nasal deformity or septal deviation. Mouth/Throat:      Mouth: Mucous membranes are moist.   Eyes:      General: Lids are normal. Vision grossly intact. Extraocular Movements: Extraocular movements intact. Conjunctiva/sclera: Conjunctivae normal.      Pupils: Pupils are equal, round, and reactive to light. Cardiovascular:      Rate and Rhythm: Normal rate. Pulmonary:      Effort: Pulmonary effort is normal.   Musculoskeletal:         General: Normal range of motion. Cervical back: Normal range of motion. Lymphadenopathy:      Cervical: No cervical adenopathy.    Skin:     Capillary Refill: Capillary refill takes less than 2 seconds. Neurological:      Mental Status: She is alert. Psychiatric:         Mood and Affect: Mood normal.               Procedure: Cerumen Removal    The patient was verbally consented. A microscope and speculum were used to visualize the external auditory canal. Cerumen was gently removed using alligator forceps, suction. Tympanic membranes are intact following the procedure was used to remove significantly impacted wax from both ears. Patient tolerated the procedure well. IMPRESSION/PLAN:  Patient with profound total loss of hearing of the right ear, with mild sloping to severe borderline profound left-sided sensorineural loss. Continue hearing aid to left ear as needed, cerumen impaction removed today in office. Follow-up in 1 year or sooner with any increase in changes of left ear discussed osseointegrated implant, patient will defer at this time. Dr. Inna Paniagua D.O. Ms. Dana Harris.  Otolaryngology Facial Plastic Surgery  :  [facility] Otolaryngology/Facial Plastic Surgery Residency  Associate Clinical Professor:  Maggie Jett, [address] [place] [address]  1974      I have discussed the case, including pertinent history and exam findings with the resident. I have seen and examined the patient and the key elements of the encounter have been performed by me. I agree with the assessment, plan and orders as documented by the resident. Patient here for follow up of medical problems. Remainder of medical problems as per resident note.       Sandra Flores,   3/29/22

## 2022-04-12 ENCOUNTER — APPOINTMENT (OUTPATIENT)
Dept: ENDOCRINOLOGY | Facility: CLINIC | Age: 52
End: 2022-04-12
Payer: MEDICAID

## 2022-04-12 ENCOUNTER — EMERGENCY (EMERGENCY)
Facility: HOSPITAL | Age: 52
LOS: 1 days | Discharge: HOME | End: 2022-04-12
Attending: EMERGENCY MEDICINE
Payer: COMMERCIAL

## 2022-04-12 VITALS
DIASTOLIC BLOOD PRESSURE: 70 MMHG | RESPIRATION RATE: 18 BRPM | TEMPERATURE: 98 F | HEART RATE: 72 BPM | OXYGEN SATURATION: 100 % | SYSTOLIC BLOOD PRESSURE: 118 MMHG

## 2022-04-12 VITALS — HEIGHT: 70 IN | WEIGHT: 236 LBS | BODY MASS INDEX: 33.79 KG/M2

## 2022-04-12 VITALS
DIASTOLIC BLOOD PRESSURE: 77 MMHG | TEMPERATURE: 99 F | OXYGEN SATURATION: 99 % | SYSTOLIC BLOOD PRESSURE: 113 MMHG | HEIGHT: 70 IN | RESPIRATION RATE: 18 BRPM | HEART RATE: 81 BPM

## 2022-04-12 VITALS — OXYGEN SATURATION: 96 % | DIASTOLIC BLOOD PRESSURE: 70 MMHG | SYSTOLIC BLOOD PRESSURE: 108 MMHG | HEART RATE: 90 BPM

## 2022-04-12 LAB
ACETONE SERPL-MCNC: NEGATIVE — SIGNIFICANT CHANGE UP
ALBUMIN SERPL ELPH-MCNC: 3.9 G/DL — SIGNIFICANT CHANGE UP (ref 3.3–5.2)
ALP SERPL-CCNC: 145 U/L — HIGH (ref 40–120)
ALT FLD-CCNC: 11 U/L — SIGNIFICANT CHANGE UP
ANION GAP SERPL CALC-SCNC: 14 MMOL/L — SIGNIFICANT CHANGE UP (ref 5–17)
APPEARANCE UR: CLEAR — SIGNIFICANT CHANGE UP
AST SERPL-CCNC: 12 U/L — SIGNIFICANT CHANGE UP
BASE EXCESS BLDV CALC-SCNC: -2.6 MMOL/L — LOW (ref -2–3)
BASOPHILS # BLD AUTO: 0.02 K/UL — SIGNIFICANT CHANGE UP (ref 0–0.2)
BASOPHILS NFR BLD AUTO: 0.4 % — SIGNIFICANT CHANGE UP (ref 0–2)
BILIRUB SERPL-MCNC: 0.2 MG/DL — LOW (ref 0.4–2)
BILIRUB UR-MCNC: NEGATIVE — SIGNIFICANT CHANGE UP
BUN SERPL-MCNC: 17.3 MG/DL — SIGNIFICANT CHANGE UP (ref 8–20)
CA-I SERPL-SCNC: 1.21 MMOL/L — SIGNIFICANT CHANGE UP (ref 1.15–1.33)
CALCIUM SERPL-MCNC: 9.2 MG/DL — SIGNIFICANT CHANGE UP (ref 8.6–10.2)
CHLORIDE BLDV-SCNC: 100 MMOL/L — SIGNIFICANT CHANGE UP (ref 98–107)
CHLORIDE SERPL-SCNC: 96 MMOL/L — LOW (ref 98–107)
CO2 SERPL-SCNC: 21 MMOL/L — LOW (ref 22–29)
COLOR SPEC: YELLOW — SIGNIFICANT CHANGE UP
CREAT SERPL-MCNC: 0.96 MG/DL — SIGNIFICANT CHANGE UP (ref 0.5–1.3)
DIFF PNL FLD: NEGATIVE — SIGNIFICANT CHANGE UP
EGFR: 95 ML/MIN/1.73M2 — SIGNIFICANT CHANGE UP
EOSINOPHIL # BLD AUTO: 0.06 K/UL — SIGNIFICANT CHANGE UP (ref 0–0.5)
EOSINOPHIL NFR BLD AUTO: 1.2 % — SIGNIFICANT CHANGE UP (ref 0–6)
FLUAV AG NPH QL: SIGNIFICANT CHANGE UP
FLUBV AG NPH QL: SIGNIFICANT CHANGE UP
GAS PNL BLDV: 130 MMOL/L — LOW (ref 136–145)
GAS PNL BLDV: SIGNIFICANT CHANGE UP
GAS PNL BLDV: SIGNIFICANT CHANGE UP
GLUCOSE BLDC GLUCOMTR-MCNC: 522
GLUCOSE BLDC GLUCOMTR-MCNC: 575
GLUCOSE BLDV-MCNC: 572 MG/DL — CRITICAL HIGH (ref 70–99)
GLUCOSE SERPL-MCNC: 534 MG/DL — CRITICAL HIGH (ref 70–99)
GLUCOSE UR QL: 1000 MG/DL
HCO3 BLDV-SCNC: 22 MMOL/L — SIGNIFICANT CHANGE UP (ref 22–29)
HCT VFR BLD CALC: 39.7 % — SIGNIFICANT CHANGE UP (ref 39–50)
HGB BLD-MCNC: 13.4 G/DL — SIGNIFICANT CHANGE UP (ref 13–17)
IMM GRANULOCYTES NFR BLD AUTO: 0.2 % — SIGNIFICANT CHANGE UP (ref 0–1.5)
KETONES UR-MCNC: ABNORMAL
LACTATE BLDV-MCNC: 1.1 MMOL/L — SIGNIFICANT CHANGE UP (ref 0.5–2)
LEUKOCYTE ESTERASE UR-ACNC: NEGATIVE — SIGNIFICANT CHANGE UP
LYMPHOCYTES # BLD AUTO: 1.1 K/UL — SIGNIFICANT CHANGE UP (ref 1–3.3)
LYMPHOCYTES # BLD AUTO: 21.4 % — SIGNIFICANT CHANGE UP (ref 13–44)
MAGNESIUM SERPL-MCNC: 2.1 MG/DL — SIGNIFICANT CHANGE UP (ref 1.6–2.6)
MCHC RBC-ENTMCNC: 29.8 PG — SIGNIFICANT CHANGE UP (ref 27–34)
MCHC RBC-ENTMCNC: 33.8 GM/DL — SIGNIFICANT CHANGE UP (ref 32–36)
MCV RBC AUTO: 88.2 FL — SIGNIFICANT CHANGE UP (ref 80–100)
MONOCYTES # BLD AUTO: 0.42 K/UL — SIGNIFICANT CHANGE UP (ref 0–0.9)
MONOCYTES NFR BLD AUTO: 8.2 % — SIGNIFICANT CHANGE UP (ref 2–14)
NEUTROPHILS # BLD AUTO: 3.54 K/UL — SIGNIFICANT CHANGE UP (ref 1.8–7.4)
NEUTROPHILS NFR BLD AUTO: 68.6 % — SIGNIFICANT CHANGE UP (ref 43–77)
NITRITE UR-MCNC: NEGATIVE — SIGNIFICANT CHANGE UP
PCO2 BLDV: 38 MMHG — LOW (ref 42–55)
PH BLDV: 7.38 — SIGNIFICANT CHANGE UP (ref 7.32–7.43)
PH UR: 6 — SIGNIFICANT CHANGE UP (ref 5–8)
PHOSPHATE SERPL-MCNC: 3.2 MG/DL — SIGNIFICANT CHANGE UP (ref 2.4–4.7)
PLATELET # BLD AUTO: 186 K/UL — SIGNIFICANT CHANGE UP (ref 150–400)
PO2 BLDV: 74 MMHG — HIGH (ref 25–45)
POTASSIUM BLDV-SCNC: 4.3 MMOL/L — SIGNIFICANT CHANGE UP (ref 3.5–5.1)
POTASSIUM SERPL-MCNC: 4.2 MMOL/L — SIGNIFICANT CHANGE UP (ref 3.5–5.3)
POTASSIUM SERPL-SCNC: 4.2 MMOL/L — SIGNIFICANT CHANGE UP (ref 3.5–5.3)
PROT SERPL-MCNC: 7 G/DL — SIGNIFICANT CHANGE UP (ref 6.6–8.7)
PROT UR-MCNC: NEGATIVE — SIGNIFICANT CHANGE UP
RBC # BLD: 4.5 M/UL — SIGNIFICANT CHANGE UP (ref 4.2–5.8)
RBC # FLD: 12.7 % — SIGNIFICANT CHANGE UP (ref 10.3–14.5)
RSV RNA NPH QL NAA+NON-PROBE: SIGNIFICANT CHANGE UP
SAO2 % BLDV: 95 % — SIGNIFICANT CHANGE UP
SARS-COV-2 RNA SPEC QL NAA+PROBE: SIGNIFICANT CHANGE UP
SODIUM SERPL-SCNC: 131 MMOL/L — LOW (ref 135–145)
SP GR SPEC: 1.01 — SIGNIFICANT CHANGE UP (ref 1.01–1.02)
UROBILINOGEN FLD QL: NEGATIVE MG/DL — SIGNIFICANT CHANGE UP
WBC # BLD: 5.15 K/UL — SIGNIFICANT CHANGE UP (ref 3.8–10.5)
WBC # FLD AUTO: 5.15 K/UL — SIGNIFICANT CHANGE UP (ref 3.8–10.5)

## 2022-04-12 PROCEDURE — 85018 HEMOGLOBIN: CPT

## 2022-04-12 PROCEDURE — 96374 THER/PROPH/DIAG INJ IV PUSH: CPT

## 2022-04-12 PROCEDURE — 36415 COLL VENOUS BLD VENIPUNCTURE: CPT

## 2022-04-12 PROCEDURE — 82330 ASSAY OF CALCIUM: CPT

## 2022-04-12 PROCEDURE — 87637 SARSCOV2&INF A&B&RSV AMP PRB: CPT

## 2022-04-12 PROCEDURE — 99285 EMERGENCY DEPT VISIT HI MDM: CPT

## 2022-04-12 PROCEDURE — 82947 ASSAY GLUCOSE BLOOD QUANT: CPT

## 2022-04-12 PROCEDURE — 83735 ASSAY OF MAGNESIUM: CPT

## 2022-04-12 PROCEDURE — 85025 COMPLETE CBC W/AUTO DIFF WBC: CPT

## 2022-04-12 PROCEDURE — 85014 HEMATOCRIT: CPT

## 2022-04-12 PROCEDURE — 84295 ASSAY OF SERUM SODIUM: CPT

## 2022-04-12 PROCEDURE — 82435 ASSAY OF BLOOD CHLORIDE: CPT

## 2022-04-12 PROCEDURE — 93010 ELECTROCARDIOGRAM REPORT: CPT

## 2022-04-12 PROCEDURE — 71045 X-RAY EXAM CHEST 1 VIEW: CPT

## 2022-04-12 PROCEDURE — 81003 URINALYSIS AUTO W/O SCOPE: CPT

## 2022-04-12 PROCEDURE — 82803 BLOOD GASES ANY COMBINATION: CPT

## 2022-04-12 PROCEDURE — 93005 ELECTROCARDIOGRAM TRACING: CPT

## 2022-04-12 PROCEDURE — 82962 GLUCOSE BLOOD TEST: CPT

## 2022-04-12 PROCEDURE — 83605 ASSAY OF LACTIC ACID: CPT

## 2022-04-12 PROCEDURE — 99285 EMERGENCY DEPT VISIT HI MDM: CPT | Mod: 25

## 2022-04-12 PROCEDURE — 84100 ASSAY OF PHOSPHORUS: CPT

## 2022-04-12 PROCEDURE — 84132 ASSAY OF SERUM POTASSIUM: CPT

## 2022-04-12 PROCEDURE — 96361 HYDRATE IV INFUSION ADD-ON: CPT

## 2022-04-12 PROCEDURE — 71045 X-RAY EXAM CHEST 1 VIEW: CPT | Mod: 26

## 2022-04-12 PROCEDURE — 82009 KETONE BODYS QUAL: CPT

## 2022-04-12 PROCEDURE — 80053 COMPREHEN METABOLIC PANEL: CPT

## 2022-04-12 PROCEDURE — 99214 OFFICE O/P EST MOD 30 MIN: CPT | Mod: 25

## 2022-04-12 RX ORDER — METOCLOPRAMIDE HCL 10 MG
10 TABLET ORAL ONCE
Refills: 0 | Status: COMPLETED | OUTPATIENT
Start: 2022-04-12 | End: 2022-04-12

## 2022-04-12 RX ORDER — ATORVASTATIN CALCIUM 40 MG/1
40 TABLET, FILM COATED ORAL
Qty: 90 | Refills: 1 | Status: ACTIVE | COMMUNITY
Start: 1900-01-01 | End: 1900-01-01

## 2022-04-12 RX ORDER — SODIUM CHLORIDE 9 MG/ML
2000 INJECTION INTRAMUSCULAR; INTRAVENOUS; SUBCUTANEOUS ONCE
Refills: 0 | Status: COMPLETED | OUTPATIENT
Start: 2022-04-12 | End: 2022-04-12

## 2022-04-12 RX ORDER — HUMAN INSULIN 100 [IU]/ML
10 INJECTION, SUSPENSION SUBCUTANEOUS ONCE
Refills: 0 | Status: COMPLETED | OUTPATIENT
Start: 2022-04-12 | End: 2022-04-12

## 2022-04-12 RX ADMIN — HUMAN INSULIN 10 UNIT(S): 100 INJECTION, SUSPENSION SUBCUTANEOUS at 15:59

## 2022-04-12 RX ADMIN — Medication 10 MILLIGRAM(S): at 14:35

## 2022-04-12 RX ADMIN — SODIUM CHLORIDE 2000 MILLILITER(S): 9 INJECTION INTRAMUSCULAR; INTRAVENOUS; SUBCUTANEOUS at 14:35

## 2022-04-12 RX ADMIN — SODIUM CHLORIDE 2000 MILLILITER(S): 9 INJECTION INTRAMUSCULAR; INTRAVENOUS; SUBCUTANEOUS at 15:59

## 2022-04-12 NOTE — ED PROVIDER NOTE - OBJECTIVE STATEMENT
50 yo male hx of IDDM, HTN, HLD presents after being found to have elevated blood glucose at his Endocrinologist office earlier today. He states that his reading at the Endocrinologist's office was 530, prompting them to send the patient to the ED for further management. Patient states that he has been taking his insulin as prescribed-he takes 50U Basalgar daily and states that he took this today am. Patient takes 12U Admelog before meals. Patient states that he has been feeling unwell for several months-has experienced occasional abdominal pain, nausea and vomiting but is not endorsing any new symptoms today besides increased polydipsia. Denies fever/chills, cough, sore throat, sob, chest pain, urinary complaints, headache, focal weakness/numbness/tingling in extremities, dizziness.

## 2022-04-12 NOTE — ED PROVIDER NOTE - NSFOLLOWUPINSTRUCTIONS_ED_ALL_ED_FT
Increase your dose of Basal Insulin (Basalgar) to 5 units. Increase your dose of Admelog (pre-meal insulin) by 2 units. Followup with your Endocrinologist.     Hyperglycemia    Hyperglycemia occurs when the glucose (sugar) level in your blood is too high. Symptoms include increased urination, increased thirst, a dry mouth, or changes in appetite. If started on a medication, take exactly as prescribed by your health care professional. Maintain a healthy lifestyle and follow up with your primary care physician.    SEEK IMMEDIATE MEDICAL CARE IF YOU HAVE ANY OF THE FOLLOWING SYMPTOMS: shortness of breath, change in mental status, nausea or vomiting, fruity smell to your breath, or any signs of dehydration.

## 2022-04-12 NOTE — ED PROVIDER NOTE - CLINICAL SUMMARY MEDICAL DECISION MAKING FREE TEXT BOX
50 yo male presents w BG in the 500s. Will workup for DKA. Obtain appropriate labs and other related studies. Monitor and reassess.

## 2022-04-12 NOTE — ED PROVIDER NOTE - ATTENDING CONTRIBUTION TO CARE
I, Gurdeep Youngblood, performed a face to face bedside interview with this patient regarding history of present illness, review of symptoms and relevant past medical, social and family history.  I completed an independent physical examination. I have communicated the patient’s plan of care and disposition with the resident.  51 year old male with PMH IDDM presents with hyperglycemia. Pt denies any Sx and states that on routine finger stick he was found angélica hyperglycemic  Gen: NAD, well appearing  CV: RRR  Pul: CTA b/l  Abd: Soft, non-distended, non-tender  Neuro: no focal deficits  Pt improved, no sign of DKA, spoke with endo and adjusted insulin regimen, no sign of infection or MI, stable for dc

## 2022-04-12 NOTE — ED PROVIDER NOTE - PHYSICAL EXAMINATION
Gen: Well appearing in NAD  Head: NC/AT  Neck: trachea midline  Resp:  No distress. ;ungs cta b/l  Cardiac: Heart rrr. No murmur. No LE edema.   Abdomen: Soft, nontender, nondistended.   Ext: no deformities  Neuro:  A&O appears non focal  Skin:  Warm and dry as visualized  Psych:  Normal affect and mood

## 2022-04-12 NOTE — ED PROVIDER NOTE - PROGRESS NOTE DETAILS
Mitchel: Spoke w patient's Endocrinologist who states that we can increase the patient's Basalgar by 5 units, premeal insulin by three units. Will plan to dc patient w endocrinology followup. Patient understood and is comfortable with the plan.

## 2022-04-12 NOTE — ED PROVIDER NOTE - ADDITIONAL NOTES AND INSTRUCTIONS:
Please excuse Miles Toscano from work through 4/14/22. He was seen in the ER at [facility] on 4/12/22 and treated for a medical condition.

## 2022-04-12 NOTE — ED ADULT NURSE NOTE - OBJECTIVE STATEMENT
Pt came in for high blood sugar>500 he has been taking his insulin, he denied chest pain breaths are even and unlabored skin is warm and dry

## 2022-04-12 NOTE — ED PROVIDER NOTE - NSICDXPASTMEDICALHX_GEN_ALL_CORE_FT
PAST MEDICAL HISTORY:  Blurry vision     Diabetes     HLD (hyperlipidemia)     HTN (hypertension)

## 2022-04-12 NOTE — ED PROVIDER NOTE - CARE PROVIDER_API CALL
Elliot Madrid (MD)  EndocrinologyMetabDiabetes; Internal Medicine  [address] [place] [address]  Phone: [phone]  Fax: [phone]  Follow Up Time: Urgent

## 2022-04-12 NOTE — ASSESSMENT
[FreeTextEntry1] : Pt was sent to ER from visit , likely dehydrated. Symptomatic hyperglycemia. Pt needs labs, fluid. Will RTO in 3 weeks but i will call pt tomorrow to check in.

## 2022-07-25 NOTE — H&P ADULT - PROBLEM SELECTOR PROBLEM 3
----- Message from Ashley Chen sent at 7/25/2022  1:57 PM CDT -----  Type:  Test Results    Who Called: pt  Name of Test (Lab/Mammo/Etc):pap  Date of Test: 7/18  Ordering Provider:   Where the test was performed: [facility]  Would the patient rather a call back or a response via [facility]? call  Best Call Back Number: [phone]  Additional Information:          Type 2 diabetes mellitus with hyperglycemia, without long-term current use of insulin

## 2022-08-26 ENCOUNTER — RESULT CHARGE (OUTPATIENT)
Age: 52
End: 2022-08-26

## 2022-08-26 ENCOUNTER — APPOINTMENT (OUTPATIENT)
Dept: ENDOCRINOLOGY | Facility: CLINIC | Age: 52
End: 2022-08-26

## 2022-08-26 VITALS
HEART RATE: 86 BPM | SYSTOLIC BLOOD PRESSURE: 126 MMHG | HEIGHT: 70 IN | DIASTOLIC BLOOD PRESSURE: 80 MMHG | BODY MASS INDEX: 30.64 KG/M2 | WEIGHT: 214 LBS

## 2022-08-26 DIAGNOSIS — F32.A DEPRESSION, UNSPECIFIED: ICD-10-CM

## 2022-08-26 DIAGNOSIS — Z86.39 PERSONAL HISTORY OF OTHER ENDOCRINE, NUTRITIONAL AND METABOLIC DISEASE: ICD-10-CM

## 2022-08-26 LAB — GLUCOSE BLDC GLUCOMTR-MCNC: 236

## 2022-08-26 PROCEDURE — 99214 OFFICE O/P EST MOD 30 MIN: CPT | Mod: 25

## 2022-08-26 PROCEDURE — 82962 GLUCOSE BLOOD TEST: CPT

## 2022-08-26 RX ORDER — SEMAGLUTIDE 1.34 MG/ML
2 INJECTION, SOLUTION SUBCUTANEOUS
Qty: 4 | Refills: 1 | Status: DISCONTINUED | COMMUNITY
Start: 2021-02-03 | End: 2022-08-26

## 2022-08-26 RX ORDER — SERTRALINE HYDROCHLORIDE 50 MG/1
50 TABLET, FILM COATED ORAL DAILY
Qty: 90 | Refills: 0 | Status: ACTIVE | COMMUNITY
Start: 2022-08-26

## 2022-08-26 NOTE — HISTORY OF PRESENT ILLNESS
[FreeTextEntry1] : Pt was sent to hospital at last visit for BS in 500s, they hydrated him and released him quickly.\par \par Now following with PCP at Dr. Salcedo [facility]. \par \par Pt cannot work/ is not employable at this time due to his poorly controlled diabetes. It would not be safe for him to work, especially with machinery as his hyperglycemia is extremely dangerous. \par \par T2DM\par Severity: Uncontrolled\par Onset: "thought he had the flu"\par Duration: approx 12-15 years ago\par Modifying Factors: on insulin for approx 2 years\par Associated Symptoms: does not feel well when he is hyperglycemic \par \par FH: Mom, Dad, Brother- T2DM\par \par SMBG\par Checking BS every other day \par On average 300s and 500s\par Denies hypoglycemic events\par FS in office 236- walked here from [place]\par Now using true metrix \par \par HGA1C FROM PCP- greater than 14 \par \par Current drug regimen\par Basaglar 50 units once daily- now taking appropriately \par Ademlog 12-14 units pre meal, often forgets but gives at least 1x a day\par \par Ozempic 0.5 mg weekly -has not had this for 2 months\par \par Eye exam: last visit 2021- denies DR- complains of blurry vision at this time\par Foot exam: does not see podiatry, denies numbness/tingling b/l feet \par Kidney disease: had JUN in hospital, recovered after hydration\par Heart disease: denies \par \par Weight: has lost approx 60 lbs since 2021\par Diet: doesn’t have a great appetite, skips meals at time\par B: egg, cereal , white rolls \par L: fast food- cheeseburger, stopped drinking regular soda\par D: whatever sister makes- pizza, pasta, veggies, meat, rice\par S: does not snack \par Exercise: no energy at this time - feels very weak\par Smoking: A pack of cigarettes every few days\par \par HLD\par -Atorvastatin 40 mg daily\par -Need updated lipid panel\par \par HTN\par -BP in office 126/80\par -Lisinopril 2.5 mg daily\par \par Vit D Def\par -Currently taking 50,000 IU weekly supplement\par -Need updated levels

## 2022-08-26 NOTE — ASSESSMENT
[FreeTextEntry1] : IGOR PRO PLACED TODAY IN OFFICE- PT TO RETURN IN 2 WEEKS\par SN: [id number]\par EX: 12-\par IOF: [id number]\par \par Will faciliate apt with . Ulysses- pt has yellowing of eyes\par \par T2DM\par -Pt has poorly controlled diabetes. For the next two weeks while wearing igor pro he must\par 1. Begin to check BS 4x a day- AC/HS-logs given\par 2. Increase Basgalar to 55 units daily\par 3. Increase Admelog to 18 units pre meal- must take before each meal-pt didn’t know he could take his pen out of the refridgerator\par -Pt interested in CDE in the future but feels he knows what he has to do and its an access issue due to affordability and transportation\par -Increase exercise as tolerated, goal of 30 mins a day 5x a week\par -Make apts with ophtho, podiatry for yearly evaluation\par \par \par HLD\par -Continue statin, need updated lipid panel\par \par HTN\par -BP stable in office, continue aceI\par \par Vit D Def\par -Continue weekly 50,000 IU supplement\par \par Will obtain last labs from PCP \par RTO 2 weeks\par

## 2022-09-01 ENCOUNTER — APPOINTMENT (OUTPATIENT)
Dept: ENDOCRINOLOGY | Facility: CLINIC | Age: 52
End: 2022-09-01

## 2022-09-08 LAB
HBA1C MFR BLD HPLC: 14
LDLC SERPL DIRECT ASSAY-MCNC: 81
MICROALBUMIN/CREAT 24H UR-RTO: 39.4

## 2022-09-09 ENCOUNTER — APPOINTMENT (OUTPATIENT)
Dept: ENDOCRINOLOGY | Facility: CLINIC | Age: 52
End: 2022-09-09

## 2022-09-09 VITALS — DIASTOLIC BLOOD PRESSURE: 60 MMHG | SYSTOLIC BLOOD PRESSURE: 96 MMHG

## 2022-09-09 VITALS
SYSTOLIC BLOOD PRESSURE: 104 MMHG | WEIGHT: 207 LBS | BODY MASS INDEX: 29.63 KG/M2 | DIASTOLIC BLOOD PRESSURE: 64 MMHG | HEIGHT: 70 IN | HEART RATE: 88 BPM | OXYGEN SATURATION: 97 %

## 2022-09-09 VITALS — DIASTOLIC BLOOD PRESSURE: 80 MMHG | SYSTOLIC BLOOD PRESSURE: 120 MMHG

## 2022-09-09 LAB — GLUCOSE BLDC GLUCOMTR-MCNC: 306

## 2022-09-09 PROCEDURE — 82962 GLUCOSE BLOOD TEST: CPT

## 2022-09-09 PROCEDURE — 99214 OFFICE O/P EST MOD 30 MIN: CPT | Mod: 25

## 2022-09-09 NOTE — HISTORY OF PRESENT ILLNESS
[FreeTextEntry1] : Now following with PCP at Dr. Salcedo [facility]. \par \par Pt cannot work/ is not employable at this time due to his poorly controlled diabetes. It would not be safe for him to work, especially with machinery as his hyperglycemia is extremely dangerous. \par \par Interval history: presents very dizzy - BP in office 96/60 with proper cuff size. Pt did just walk here, over 30 min walk. Pt now resting and drinking water.\par \par T2DM\par Severity: Uncontrolled\par Onset: "thought he had the flu"\par Duration: approx 12-15 years ago\par Modifying Factors: on insulin for approx 2 years\par Associated Symptoms: does not feel well when he is hyperglycemic \par \par FH: Mom, Dad, Brother- T2DM\par \par SMBG\par Checking BS twice day\par Fasting in AM- 300s\par Around 2/3 pm- high 300s\par \par Abelino PRO download\par Average glucose 251\par GMI 9.3%\par Glucose variability 30.0%\par \par Denies hypoglycemic events\par FS in office 306- walked here from [place]\par Now using true metrix \par \par HGA1C FROM PCP- greater than 14 \par \par Current drug regimen\par Basaglar 50 units once daily- now taking appropriately \par Ademlog 15 units pre meal- eats about 2 meals a day\par \par Tried Ozempic but was not compliant\par \par Eye exam: last visit 2021- denies DR- complains of blurry vision at this time\par Foot exam: does not see podiatry, denies numbness/tingling b/l feet \par Kidney disease: had JUN in hospital, recovered after hydration\par Heart disease: denies \par \par Weight: has lost approx 70 lbs since 2021\par Diet: doesn’t have a great appetite, skips meals at time\par B: egg, cereal , white rolls \par L: fast food- cheeseburger, stopped drinking regular soda\par D: whatever sister makes- pizza, pasta, veggies, meat, rice\par S: does not snack \par Exercise: no energy at this time - feels very weak\par Smoking: A pack of cigarettes every few days\par \par HLD\par -Atorvastatin 40 mg daily\par -Need updated lipid panel\par \par HTN\par -BP in office 104/64\par -Lisinopril 2.5 mg daily\par \par Vit D Def\par -Currently taking 50,000 IU weekly supplement\par -Need updated levels

## 2022-09-09 NOTE — ASSESSMENT
[FreeTextEntry1] : After 2 bottles of water, BP now 120/80, pt daughter picking him up and he will not be walking home.\par \par T2DM\par -Pt has poorly controlled diabetes. Abelino pro is showing high numbers often. \par 1. Increase frequency BS 4x a day- AC/HS-logs given\par 2. Increase Basgalar to 60 units daily\par 3. Continue Ademlog 15 units pre meal \par -Pt interested in CDE in the future but feels he knows what he has to do and its an access issue due to affordability and transportation\par -Increase exercise as tolerated, goal of 30 mins a day 5x a week\par -Make apts with ophtho, podiatry for yearly evaluation\par \par \par HLD\par -Continue statin, need updated lipid panel\par \par HTN\par -BP stable in office, continue aceI\par \par Vit D Def\par -Continue weekly 50,000 IU supplement\par \par RTO 2 weeks\par

## 2022-09-09 NOTE — REVIEW OF SYSTEMS
[Fatigue] : fatigue [Recent Weight Loss (___ Lbs)] : recent weight loss: [unfilled] lbs [Dizziness] : dizziness [Depression] : depression [Recent Weight Gain (___ Lbs)] : no recent weight gain [Visual Field Defect] : no visual field defect [Blurred Vision] : no blurred vision [Dysphagia] : no dysphagia [Neck Pain] : no neck pain [Dysphonia] : no dysphonia [Chest Pain] : no chest pain [Shortness Of Breath] : no shortness of breath [Constipation] : no constipation [Diarrhea] : no diarrhea [Polyuria] : no polyuria

## 2022-09-19 ENCOUNTER — NON-APPOINTMENT (OUTPATIENT)
Age: 52
End: 2022-09-19

## 2022-09-19 ENCOUNTER — APPOINTMENT (OUTPATIENT)
Dept: CARDIOLOGY | Facility: CLINIC | Age: 52
End: 2022-09-19

## 2022-09-19 VITALS — DIASTOLIC BLOOD PRESSURE: 62 MMHG | SYSTOLIC BLOOD PRESSURE: 92 MMHG | HEART RATE: 84 BPM

## 2022-09-19 VITALS
BODY MASS INDEX: 29.2 KG/M2 | TEMPERATURE: 98.5 F | WEIGHT: 204 LBS | HEIGHT: 70 IN | HEART RATE: 80 BPM | OXYGEN SATURATION: 95 % | DIASTOLIC BLOOD PRESSURE: 68 MMHG | SYSTOLIC BLOOD PRESSURE: 104 MMHG

## 2022-09-19 VITALS — SYSTOLIC BLOOD PRESSURE: 100 MMHG | HEART RATE: 86 BPM | DIASTOLIC BLOOD PRESSURE: 74 MMHG

## 2022-09-19 VITALS — HEART RATE: 79 BPM | SYSTOLIC BLOOD PRESSURE: 98 MMHG | DIASTOLIC BLOOD PRESSURE: 70 MMHG

## 2022-09-19 DIAGNOSIS — Z78.9 OTHER SPECIFIED HEALTH STATUS: ICD-10-CM

## 2022-09-19 DIAGNOSIS — F17.200 NICOTINE DEPENDENCE, UNSPECIFIED, UNCOMPLICATED: ICD-10-CM

## 2022-09-19 DIAGNOSIS — Z82.49 FAMILY HISTORY OF ISCHEMIC HEART DISEASE AND OTHER DISEASES OF THE CIRCULATORY SYSTEM: ICD-10-CM

## 2022-09-19 DIAGNOSIS — R06.02 SHORTNESS OF BREATH: ICD-10-CM

## 2022-09-19 DIAGNOSIS — Z72.3 LACK OF PHYSICAL EXERCISE: ICD-10-CM

## 2022-09-19 PROCEDURE — 93000 ELECTROCARDIOGRAM COMPLETE: CPT

## 2022-09-19 PROCEDURE — 99214 OFFICE O/P EST MOD 30 MIN: CPT | Mod: 25

## 2022-09-19 RX ORDER — ISOPROPYL ALCOHOL 70 ML/100ML
70 SWAB TOPICAL
Qty: 100 | Refills: 0 | Status: ACTIVE | COMMUNITY
Start: 2020-08-31

## 2022-09-19 NOTE — HISTORY OF PRESENT ILLNESS
[FreeTextEntry1] : Patient is a 52-year-old -American male with a past medical history of type 2 diabetes mellitus, hypertension, hyperlipidemia chronic smoker, still smoking, had a left heart cardiac catheterization in 2020 which showed normal coronary arteries.  Patient now presents for cardiac evaluation because of persistent dizziness for the last 4 to 5 years.  Patient has had several episodes of near syncope and dizziness.  Past summer he had 1 episode of syncope when he was a helping his cousin washer caught in the hot weather, he bent over to clean the wheels, he felt dizzy then he was trying to sit, fell on the ground.  Patient denies any preceding palpitations.  Patient has a chronic shortness of breath for many years.  Patient denies any chest tightness.  Patient denies orthopnea, PND or leg edema.\par \par \par Patient denies any alcohol or substance abuse.

## 2022-09-19 NOTE — ASSESSMENT
[FreeTextEntry1] : EKG 9/19/2022- Sinus  Rhythm \par WITHIN NORMAL LIMITS\par \par Assessment:\par 1.  Syncope/dizziness\par 2.  Diabetes mellitus/hypertension\par 3.  Chronic smoker still smoking.\par \par Recommendations:\par 1.  Echocardiogram, carotid duplex and regular stress test\par 2.  Holter monitor for 72-hour\par 3.  If patient's work-up as planned above is negative, I may refer him to EP for implantation of ILR\par 4.  Follow-up after testing

## 2022-09-30 ENCOUNTER — APPOINTMENT (OUTPATIENT)
Dept: CARDIOLOGY | Facility: CLINIC | Age: 52
End: 2022-09-30

## 2022-10-14 ENCOUNTER — APPOINTMENT (OUTPATIENT)
Dept: CARDIOLOGY | Facility: CLINIC | Age: 52
End: 2022-10-14

## 2022-10-14 PROCEDURE — 93306 TTE W/DOPPLER COMPLETE: CPT

## 2022-10-17 ENCOUNTER — APPOINTMENT (OUTPATIENT)
Dept: CARDIOLOGY | Facility: CLINIC | Age: 52
End: 2022-10-17

## 2022-10-17 PROCEDURE — 93015 CV STRESS TEST SUPVJ I&R: CPT

## 2022-10-18 ENCOUNTER — NON-APPOINTMENT (OUTPATIENT)
Age: 52
End: 2022-10-18

## 2022-10-21 ENCOUNTER — APPOINTMENT (OUTPATIENT)
Dept: ENDOCRINOLOGY | Facility: CLINIC | Age: 52
End: 2022-10-21

## 2022-10-21 ENCOUNTER — RESULT CHARGE (OUTPATIENT)
Age: 52
End: 2022-10-21

## 2022-10-21 VITALS
HEIGHT: 70 IN | SYSTOLIC BLOOD PRESSURE: 114 MMHG | BODY MASS INDEX: 29.49 KG/M2 | WEIGHT: 206 LBS | DIASTOLIC BLOOD PRESSURE: 82 MMHG

## 2022-10-21 LAB — GLUCOSE BLDC GLUCOMTR-MCNC: 208

## 2022-10-21 PROCEDURE — 82962 GLUCOSE BLOOD TEST: CPT

## 2022-10-21 PROCEDURE — 99214 OFFICE O/P EST MOD 30 MIN: CPT | Mod: 25

## 2022-10-21 NOTE — REVIEW OF SYSTEMS
[Fatigue] : fatigue [Recent Weight Gain (___ Lbs)] : no recent weight gain [Recent Weight Loss (___ Lbs)] : recent weight loss: [unfilled] lbs [Blurred Vision] : blurred vision [Dysphagia] : no dysphagia [Neck Pain] : no neck pain [Dysphonia] : no dysphonia [Chest Pain] : no chest pain [Palpitations] : no palpitations [Shortness Of Breath] : no shortness of breath [SOB on Exertion] : shortness of breath on exertion [Constipation] : no constipation [Diarrhea] : no diarrhea [Polyuria] : polyuria [Dizziness] : dizziness [Polydipsia] : no polydipsia

## 2022-10-21 NOTE — HISTORY OF PRESENT ILLNESS
[FreeTextEntry1] : Now following with PCP at Dr. [facility]. \par \par Pt still unable/cannot work/ is not employable at this time due to his poorly controlled diabetes. It would not be safe for him to work, especially with machinery as his hyperglycemia is extremely dangerous. \par \par Has a poor appetite, never hungry\par \par T2DM\par Severity: Uncontrolled\par Onset: "thought he had the flu"\par Duration: approx 12-15 years ago\par Modifying Factors: on insulin for approx 2 years\par Associated Symptoms: does not feel well when he is hyperglycemic \par \par FH: Mom, Dad, Brother- T2DM\par \par SMBG\par Checking BS once a day only fasting\par Fasting in AM- 270s-350s\par \par Denies hypoglycemic events\par FS in office 207- fasting since yesterday \par \par Now using true metrix meter\par \par HGA1C FROM PCP- greater than 14 -8/2022\par \par Current drug regimen\par Basaglar 60 units once daily-first thing in AM \par Ademlog 12 units pre meal- takes that once to two times a day \par \par I monitored him taking insulin- he does it correctly\par \par Tried Ozempic but was not compliant\par \par Eye exam: last visit 2021- denies DR- complains of blurry vision at this time\par Foot exam: does not see podiatry, denies numbness/tingling b/l feet \par Kidney disease: had JUN in hospital, recovered after hydration\par Heart disease: denies \par \par Weight: has lost approx 70 lbs since 2021- unintentional but no appetite \par Diet: doesn’t have a great appetite, skips meals at time\par B: egg, cereal , white rolls \par L: fast food- cheeseburger, stopped drinking regular soda\par D: whatever sister makes- pizza, pasta, veggies, meat, rice\par S: does not snack \par Exercise: no energy at this time - feels very weak\par Smoking: A pack of cigarettes every few days\par \par HLD\par -Atorvastatin 40 mg daily\par -Need updated lipid panel\par \par HTN\par -BP in office 114/82\par -Lisinopril 2.5 mg daily\par \par Vit D Def\par -Currently taking 50,000 IU weekly supplement\par -Need updated levels

## 2022-10-21 NOTE — ASSESSMENT
[FreeTextEntry1] : Pt to do labs 11/16 with pcp and will have results by our next ap \par \par T2DM\par -Pt has poorly controlled diabetes but I also am concerned that he is dizzy all the time( im not confident its a blood sugar related issue since he has been high for so long but only recently seems to have decompensated with being so dizzy that he cannot work).\par 1. Increase frequency BS 4x a day- AC/HS-logs given\par 2. Continue Basgalar to 60 units daily\par 3. Continue Ademlog 15 units pre meal \par 4. Begin Jardiance 25 mg daily- pt strictly instructed to call us if this is not covered.\par -Pt interested in CDE in the future but feels he knows what he has to do and its an access issue due to affordability and transportation\par -Increase exercise as tolerated, goal of 30 mins a day 5x a week\par -Make apts with ophtho, podiatry for yearly evaluation\par \par \par HLD\par -Continue statin, need updated lipid panel\par \par HTN\par -BP stable in office, continue aceI\par \par Vit D Def\par -Continue weekly 50,000 IU supplement\par \par RTO next avail - pt needs frequent follow up

## 2022-10-25 RX ORDER — EMPAGLIFLOZIN 25 MG/1
25 TABLET, FILM COATED ORAL DAILY
Qty: 90 | Refills: 1 | Status: DISCONTINUED | COMMUNITY
Start: 2022-10-21 | End: 2022-10-25

## 2022-10-31 ENCOUNTER — APPOINTMENT (OUTPATIENT)
Dept: CARDIOLOGY | Facility: CLINIC | Age: 52
End: 2022-10-31

## 2022-10-31 PROCEDURE — 93880 EXTRACRANIAL BILAT STUDY: CPT

## 2022-11-03 ENCOUNTER — TRANSCRIPTION ENCOUNTER (OUTPATIENT)
Age: 52
End: 2022-11-03

## 2022-11-08 ENCOUNTER — APPOINTMENT (OUTPATIENT)
Dept: ENDOCRINOLOGY | Facility: CLINIC | Age: 52
End: 2022-11-08

## 2022-11-14 ENCOUNTER — APPOINTMENT (OUTPATIENT)
Dept: CARDIOLOGY | Facility: CLINIC | Age: 52
End: 2022-11-14

## 2022-11-14 VITALS
SYSTOLIC BLOOD PRESSURE: 114 MMHG | WEIGHT: 208 LBS | HEART RATE: 82 BPM | OXYGEN SATURATION: 97 % | DIASTOLIC BLOOD PRESSURE: 74 MMHG | HEIGHT: 70 IN | BODY MASS INDEX: 29.78 KG/M2 | TEMPERATURE: 98.1 F

## 2022-11-14 PROCEDURE — 99213 OFFICE O/P EST LOW 20 MIN: CPT

## 2022-11-14 NOTE — ASSESSMENT
[FreeTextEntry1] : EKG 9/19/2022- Sinus  Rhythm \par WITHIN NORMAL LIMITS\par Echocardiogram October 14, 2022: LVEF 55 to 60%.  Normal echo\par Regular stress test October 17, 2022: Nondiagnostic stress test because of submaximal heart rate of 71% MPHR, achieved 5 METS workload, exercise time 3 minutes and 55 seconds\par Carotid duplex 2/31, 2022: No significant stenosis.  Mild plaque burden\par \par Assessment:\par 1.  Syncope/dizziness\par 2.  Diabetes mellitus/hypertension\par 3.  Chronic smoker still smoking.\par \par Recommendations:\par \par Patient's echocardiogram, stress test carotid duplex results were discussed.\par Patient is regular stress test nondiagnostic.  Patient is scheduled to have a pharmacological nuclear stress test.\par \par 1.  Patient to complete pharmacological nuclear stress test scheduled for November 28, 2022\par 2.  Holter monitor for 72-hour\par 3.  Follow-up in a month

## 2022-11-14 NOTE — HISTORY OF PRESENT ILLNESS
[FreeTextEntry1] : Patient is a 52-year-old -American male with a past medical history of type 2 diabetes mellitus, hypertension, hyperlipidemia chronic smoker, still smoking, had a left heart cardiac catheterization in 2020 which showed normal coronary arteries.  Patient now presents for cardiac evaluation because of persistent dizziness for the last 4 to 5 years.  Patient has had several episodes of near syncope and dizziness.  Past summer he had 1 episode of syncope when he was a helping his cousin genaroer caught in the hot weather, he bent over to clean the wheels, he felt dizzy then he was trying to sit, fell on the ground. \par \par \par Today, patient presents for a follow-up visit.  Since last visit he had an echocardiogram, treadmill stress test carotid duplex.  Holter monitor was done but it for some reason it was not registered.  It needs to be repeated.  Patient is feeling fine denies any episodes of syncope since last visit.  Patient has a chronic shortness of breath for many years.  Patient denies any chest tightness.  Patient denies orthopnea, PND or leg edema.\par \par \par Patient denies any alcohol or substance abuse.

## 2022-11-22 ENCOUNTER — TRANSCRIPTION ENCOUNTER (OUTPATIENT)
Age: 52
End: 2022-11-22

## 2022-11-23 ENCOUNTER — APPOINTMENT (OUTPATIENT)
Dept: NEUROLOGY | Facility: CLINIC | Age: 52
End: 2022-11-23

## 2022-11-28 ENCOUNTER — APPOINTMENT (OUTPATIENT)
Dept: CARDIOLOGY | Facility: CLINIC | Age: 52
End: 2022-11-28

## 2022-11-28 PROCEDURE — 93015 CV STRESS TEST SUPVJ I&R: CPT

## 2022-11-28 PROCEDURE — 78452 HT MUSCLE IMAGE SPECT MULT: CPT

## 2022-11-28 PROCEDURE — A9500: CPT

## 2022-12-01 ENCOUNTER — TRANSCRIPTION ENCOUNTER (OUTPATIENT)
Age: 52
End: 2022-12-01

## 2022-12-23 ENCOUNTER — TRANSCRIPTION ENCOUNTER (OUTPATIENT)
Age: 52
End: 2022-12-23

## 2023-01-12 ENCOUNTER — APPOINTMENT (OUTPATIENT)
Dept: CARDIOLOGY | Facility: CLINIC | Age: 53
End: 2023-01-12
Payer: MEDICAID

## 2023-01-12 ENCOUNTER — NON-APPOINTMENT (OUTPATIENT)
Age: 53
End: 2023-01-12

## 2023-01-12 VITALS
HEIGHT: 70 IN | TEMPERATURE: 98.4 F | HEART RATE: 78 BPM | WEIGHT: 206 LBS | BODY MASS INDEX: 29.49 KG/M2 | OXYGEN SATURATION: 99 % | SYSTOLIC BLOOD PRESSURE: 122 MMHG | DIASTOLIC BLOOD PRESSURE: 79 MMHG

## 2023-01-12 PROCEDURE — 93000 ELECTROCARDIOGRAM COMPLETE: CPT

## 2023-01-12 PROCEDURE — 99213 OFFICE O/P EST LOW 20 MIN: CPT | Mod: 25

## 2023-01-12 NOTE — HISTORY OF PRESENT ILLNESS
[FreeTextEntry1] : Patient is a 52-year-old -American male with a past medical history of type 2 diabetes mellitus, hypertension, hyperlipidemia chronic smoker, still smoking, had a left heart cardiac catheterization in 2020 which showed normal coronary arteries.  Patient now presents for cardiac evaluation because of persistent dizziness and syncope.  \par \par Today, patient presents for a follow-up visit.   Patient has a chronic shortness of breath for many years.  Patient denies any chest tightness.  Patient denies orthopnea, PND or leg edema.  Patient completed his pharmacological nuclear stress test and Holter monitor\par \par \par Patient denies any alcohol or substance abuse.

## 2023-01-12 NOTE — ASSESSMENT
Pt transferred to room 254 via w/c. All belongings sent. Pain verbalizes good pain control. Report to Rohan DIXON.   [FreeTextEntry1] : EKG 9/19/2022- Sinus  Rhythm \par WITHIN NORMAL LIMITS\par Echocardiogram October 14, 2022: LVEF 55 to 60%.  Normal echo\par Regular stress test October 17, 2022: Nondiagnostic stress test because of submaximal heart rate of 71% MPHR, achieved 5 METS workload, exercise time 3 minutes and 55 seconds\par Carotid duplex 2/31, 2022: No significant stenosis.  Mild plaque burden\par Holter monitor December 2022: Normal sinus rhythm, no atrial fibrillation unremarkable.\par Pharmacological nuclear stress test: 1128, 2022: Normal study\par \par EKG 1/12/2023- Sinus  Rhythm \par WITHIN NORMAL LIMITS\par \par Assessment:\par 1.  Syncope/dizziness\par 2.  Diabetes mellitus/hypertension\par 3.  Chronic smoker still smoking.\par \par Recommendations:\par \par Patient's nuclear stress test and Holter monitor results were discussed with the patient.\par Patient states he has an upcoming neurology appointment for evaluation of dizziness\par Follow-up in 1 to 2 years.

## 2023-02-17 ENCOUNTER — APPOINTMENT (OUTPATIENT)
Dept: ENDOCRINOLOGY | Facility: CLINIC | Age: 53
End: 2023-02-17
Payer: MEDICAID

## 2023-02-17 VITALS
HEART RATE: 102 BPM | SYSTOLIC BLOOD PRESSURE: 112 MMHG | WEIGHT: 199 LBS | BODY MASS INDEX: 28.49 KG/M2 | HEIGHT: 70 IN | OXYGEN SATURATION: 97 % | DIASTOLIC BLOOD PRESSURE: 72 MMHG

## 2023-02-17 LAB — GLUCOSE BLDC GLUCOMTR-MCNC: 201

## 2023-02-17 PROCEDURE — 82962 GLUCOSE BLOOD TEST: CPT

## 2023-02-17 PROCEDURE — 99214 OFFICE O/P EST MOD 30 MIN: CPT | Mod: 25

## 2023-02-17 NOTE — REVIEW OF SYSTEMS
[Fatigue] : fatigue [Recent Weight Loss (___ Lbs)] : recent weight loss: [unfilled] lbs [Diarrhea] : diarrhea [Dizziness] : dizziness [Visual Field Defect] : no visual field defect [Blurred Vision] : no blurred vision [Dysphagia] : no dysphagia [Neck Pain] : no neck pain [Dysphonia] : no dysphonia [Chest Pain] : no chest pain [Shortness Of Breath] : no shortness of breath [Polyuria] : no polyuria [Polydipsia] : no polydipsia

## 2023-02-17 NOTE — ASSESSMENT
[FreeTextEntry1] : Pt did labs 2 weeks ago from [facility] but they did not answer us when we requested labs be faxed. Pt is going to obtain labs himself ,will call to discuss results.\par \par PT to see neurology for dizziness consulation on 2.27. Would like to have results of labs by then.\par \par T2DM\par 1. Increase frequency BS 4x a day- AC/HS-logs given\par 2. Continue Basgalar to 60 units daily\par 3. Continue Ademlog 15 units pre meal \par 4. Decrease Stegaltro to 5 mg daily- pt states since this medication was added at 15 mg (higher dose) , he has been having episodes of hyperglycemia \par -Pt interested in CDE in the future but feels he knows what he has to do and its an access issue due to affordability and transportation\par -Increase exercise as tolerated, goal of 30 mins a day 5x a week\par -Make apts with ophtho, podiatry for yearly evaluation\par \par \par HLD\par -Continue statin, need updated lipid panel\par \par HTN\par -BP stable in office, continue aceI\par \par Vit D Def\par -Continue weekly 50,000 IU supplement\par \par RTO no later then 4 weeks- pt needs frequent follow up

## 2023-02-17 NOTE — HISTORY OF PRESENT ILLNESS
[FreeTextEntry1] : Now following with PCP at Dr. Salcedo [facility]. \par \par Pt still unable/cannot work/ is not employable at this time due to his poorly controlled diabetes. It would not be safe for him to work, especially with machinery as his hyperglycemia is extremely dangerous. \par \par Dizziness appears to be ruled out as a cardiac issue but now needs to see neurology \par \par T2DM\par Severity: Uncontrolled\par Onset: "thought he had the flu"\par Duration: approx 12-15 years ago\par Modifying Factors: on insulin for approx 2 years\par Associated Symptoms: does not feel well when he is hyperglycemic \par \par FH: Mom, Dad, Brother- T2DM\par \par SMBG\par Checking BS once a day only fasting, rarely twice\par On average 120s- 140s\par Throughout the day some 200s \par Does endorse some hypoglycemia since adding the steglatro\par \par FS in office 201- chicken sandwhich on bun\par \par Now using true metrix meter\par \par HGA1C FROM PCP- greater than 14 -8/2022\par Need updated HGA1C\par \par Current drug regimen\par Basaglar 60 units once daily-first thing in AM \par Ademlog 14 units pre meal- takes that once to two times a day \par Stegaltro 15 mg daily\par \par I monitored him taking insulin- he does it correctly\par \par Tried Ozempic but was not compliant\par \par Eye exam: last visit 2021- denies DR- complains of blurry vision at this time- overdue\par Foot exam: does not see podiatry, denies numbness/tingling b/l feet \par Kidney disease: had JUN in hospital, recovered after hydration\par Heart disease: denies \par \par Weight: has lost approx 77 lbs since 2021- unintentional \par Diet: doesn’t have a great appetite, skips meals at time\par B: egg, cereal , white rolls \par L: fast food- cheeseburger, stopped drinking regular soda\par D: whatever sister makes- pizza, pasta, veggies, meat, rice\par S: does not snack \par Exercise: no energy at this time - feels very weak\par Smoking: A pack of cigarettes every few days\par \par HLD\par -Atorvastatin 40 mg daily\par -Need updated lipid panel\par \par HTN\par -BP in office 112/72\par -Lisinopril 2.5 mg daily\par \par Vit D Def\par -Currently taking 50,000 IU weekly supplement\par -Need updated levels

## 2023-02-27 ENCOUNTER — APPOINTMENT (OUTPATIENT)
Dept: NEUROLOGY | Facility: CLINIC | Age: 53
End: 2023-02-27
Payer: MEDICAID

## 2023-02-27 VITALS
SYSTOLIC BLOOD PRESSURE: 120 MMHG | HEIGHT: 70 IN | WEIGHT: 200 LBS | BODY MASS INDEX: 28.63 KG/M2 | DIASTOLIC BLOOD PRESSURE: 82 MMHG

## 2023-02-27 VITALS — SYSTOLIC BLOOD PRESSURE: 120 MMHG | DIASTOLIC BLOOD PRESSURE: 82 MMHG

## 2023-02-27 DIAGNOSIS — Z86.39 PERSONAL HISTORY OF OTHER ENDOCRINE, NUTRITIONAL AND METABOLIC DISEASE: ICD-10-CM

## 2023-02-27 DIAGNOSIS — Z86.59 PERSONAL HISTORY OF OTHER MENTAL AND BEHAVIORAL DISORDERS: ICD-10-CM

## 2023-02-27 DIAGNOSIS — Z86.79 PERSONAL HISTORY OF OTHER DISEASES OF THE CIRCULATORY SYSTEM: ICD-10-CM

## 2023-02-27 DIAGNOSIS — R42 DIZZINESS AND GIDDINESS: ICD-10-CM

## 2023-02-27 DIAGNOSIS — R55 SYNCOPE AND COLLAPSE: ICD-10-CM

## 2023-02-27 PROCEDURE — 99204 OFFICE O/P NEW MOD 45 MIN: CPT

## 2023-02-27 NOTE — CONSULT LETTER
[Dear  ___] : Dear ~PALAK, [Courtesy Letter:] : I had the pleasure of seeing your patient, [unfilled], in my office today. [Please see my note below.] : Please see my note below. [Consult Closing:] : Thank you very much for allowing me to participate in the care of this patient.  If you have any questions, please do not hesitate to contact me. [Sincerely,] : Sincerely, [FreeTextEntry3] : Driss Corbett MD.

## 2023-02-27 NOTE — ASSESSMENT
[FreeTextEntry1] : This is a 52-year-old man who has a longstanding history of dizzy spells.\par He denies true vertigo.\par Likely this represents a vasovagal phenomenon.\par I would like to obtain an MRI of the brain to evaluate for any posterior fossa pathology.\par I will also obtain an EEG to rule out any epileptiform activity.\par \par I will see him back after the above work-up.

## 2023-02-27 NOTE — HISTORY OF PRESENT ILLNESS
[FreeTextEntry1] : I saw this patient in the office today.\par \par He describes episodes of dizziness which is worse when he exerts himself or bends over.\par This has been going on since the middle of 2015 at least.\par On 4 separate occasions he reports that he passed out.\par \par He describes the dizziness as a lightheaded sensation rather than true vertigo.

## 2023-02-27 NOTE — PHYSICAL EXAM
[General Appearance - Alert] : alert [General Appearance - In No Acute Distress] : in no acute distress [Oriented To Time, Place, And Person] : oriented to person, place, and time [Affect] : the affect was normal [Memory Recent] : recent memory was not impaired [Memory Remote] : remote memory was not impaired [Cranial Nerves Optic (II)] : visual acuity intact bilaterally,  visual fields full to confrontation, pupils equal round and reactive to light [Cranial Nerves Oculomotor (III)] : extraocular motion intact [Cranial Nerves Trigeminal (V)] : facial sensation intact symmetrically [Cranial Nerves Facial (VII)] : face symmetrical [Cranial Nerves Vestibulocochlear (VIII)] : hearing was intact bilaterally [Cranial Nerves Glossopharyngeal (IX)] : tongue and palate midline [Cranial Nerves Accessory (XI - Cranial And Spinal)] : head turning and shoulder shrug symmetric [Cranial Nerves Hypoglossal (XII)] : there was no tongue deviation with protrusion [Motor Tone] : muscle tone was normal in all four extremities [Motor Strength] : muscle strength was normal in all four extremities [Sensation Tactile Decrease] : light touch was intact [Sensation Pain / Temperature Decrease] : pain and temperature was intact [Abnormal Walk] : normal gait [1+] : Patella left 1+ [0] : Ankle jerk left 0 [Optic Disc Abnormality] : the optic disc were normal in size and color [Dysarthria] : no dysarthria [Aphasia] : no dysphasia/aphasia [Romberg's Sign] : Romberg's sign was negtive [Coordination - Dysmetria Impaired Finger-to-Nose Bilateral] : not present [Plantar Reflex Right Only] : normal on the right [Plantar Reflex Left Only] : normal on the left

## 2023-02-28 ENCOUNTER — APPOINTMENT (OUTPATIENT)
Dept: GASTROENTEROLOGY | Facility: CLINIC | Age: 53
End: 2023-02-28
Payer: MEDICAID

## 2023-02-28 VITALS
DIASTOLIC BLOOD PRESSURE: 80 MMHG | HEIGHT: 70 IN | OXYGEN SATURATION: 98 % | RESPIRATION RATE: 16 BRPM | WEIGHT: 200 LBS | BODY MASS INDEX: 28.63 KG/M2 | SYSTOLIC BLOOD PRESSURE: 120 MMHG | HEART RATE: 80 BPM

## 2023-02-28 DIAGNOSIS — R13.12 DYSPHAGIA, OROPHARYNGEAL PHASE: ICD-10-CM

## 2023-02-28 DIAGNOSIS — R10.9 UNSPECIFIED ABDOMINAL PAIN: ICD-10-CM

## 2023-02-28 DIAGNOSIS — R63.4 ABNORMAL WEIGHT LOSS: ICD-10-CM

## 2023-02-28 PROCEDURE — 99214 OFFICE O/P EST MOD 30 MIN: CPT

## 2023-02-28 PROCEDURE — 99204 OFFICE O/P NEW MOD 45 MIN: CPT

## 2023-02-28 NOTE — REASON FOR VISIT
[Initial Evaluation] : an initial evaluation [FreeTextEntry1] : positive FIT test, weight loss, abdominal pain, dysphagia, belching

## 2023-02-28 NOTE — ASSESSMENT
[FreeTextEntry1] : 52 year old male with recent positive FIT test. No family history of colon cancer or polyps. \par Will schedule colonoscopy for further evaluation. \par \par Epigastric and periumbilical tenderness on physical exam. Reports of occasional dysphagia to solids and an 80 lb weight loss over 2 years. Will schedule EGD for further evaluation. \par \par Symptoms, including abdominal pain and weight loss, are concerning for possible malignancy so procedures will be expedited. \par \par I have discussed the indications, benefits, risks  (including but not limited to reaction to the anesthesia, infection, bleeding, missed lesions, and perforation),  and alternatives to an EGD and colonoscopy with the patient. He understands all options and has agreed to have both procedures. He is medically optimized. \par \par GaviLyte prep\par Start Omeprazole 20 mg QD\par GERD diet\par High fiber diet

## 2023-02-28 NOTE — HISTORY OF PRESENT ILLNESS
[FreeTextEntry1] : YUMIKO GRIFFITH is a 52 year old male with PMH of HTN, HLD, diabetes, presenting today for evaluation of positive FIT test. No prior colonoscopy. Family history is negative for colon cancer and colon polyps. He reports a chronic mid abdominal pain, crampy in nature, for several years. It mostly occurs after eating and will last hours. There is associated dysphagia to solids and unintentional weight loss. He reports an 80 lb unintentional weight loss over the last 2 years. Appetite is stable. Denies nausea, vomiting, heartburn. \par \par No prior colonoscopy. Recent FIT test was positive. Denies family history of colon cancer or colon polyps. Denies constipation, diarrhea, black or bloody stools. He moves his bowels every 2 days.

## 2023-02-28 NOTE — PHYSICAL EXAM
[Alert] : alert [Normal Voice/Communication] : normal voice/communication [Healthy Appearing] : healthy appearing [No Acute Distress] : no acute distress [Sclera] : the sclera and conjunctiva were normal [Hearing Threshold Finger Rub Not [place]] : hearing was normal [Normal Lips/Gums] : the lips and gums were normal [Oropharynx] : the oropharynx was normal [Normal Appearance] : the appearance of the neck was normal [No Neck Mass] : no neck mass was observed [No Respiratory Distress] : no respiratory distress [No Acc Muscle Use] : no accessory muscle use [Respiration, Rhythm And Depth] : normal respiratory rhythm and effort [Auscultation Breath Sounds / Voice Sounds] : lungs were clear to auscultation bilaterally [Heart Rate And Rhythm] : heart rate was normal and rhythm regular [Normal S1, S2] : normal S1 and S2 [Murmurs] : no murmurs [Bowel Sounds] : normal bowel sounds [No Masses] : no abdominal mass palpated [Abdomen Soft] : soft [] : no hepatosplenomegaly [Epigastric] : in the epigastric area [Periumbilical] : in the periumbilical area [Patient Refused] : rectal exam was refused by the patient [Oriented To Time, Place, And Person] : oriented to person, place, and time

## 2023-03-07 NOTE — ED STATDOCS - DISPOSITION TYPE
HPI    Pt here today for Avastin injection OD. Per pt no new changes since last   being seen..     EYE MEDS:   OTC art tears 1 gtt QHS OU   Last edited by Yasmin Orozco on 3/7/2023  1:38 PM.       OCT   OD: Good quality. Normal foveal contour  Superior IRF, exudates in parafovea. , decreased in central SRF  OS: Good quality. Normal foveal contour without IRF, SRF. Some small noncentral exudates    Prior WFFA  OD - normal transit time. Hyperfluorescence early c/w Ma/edema - sig NP    OS - Hyperfluorescence early c/w Ma/edema  .NV nasal      A/P    1. 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 DME OU   6/19 - pt did not FU until 8/20  S/p Avastin OD x 2  S/p Ozurdex OD x 1 9/20 11/22 - not seen x 2 years  3/23 - had second opinion with Dr. Suh.  Pt would like to try a few Eylea prior to steroids if needed    Eylea OD today    Approved for Ozurdex if minimal response to Eylea    3. HTN Ret OU  BS/BP/chol control    4. NS OU  Monitor - good Va    5. Cyst RLL  Removed by Rhea  Happy with result        1 month OCT no dilate    Risks, benefits, and alternatives to treatment discussed in detail with the patient.  The patient voiced understanding and wished to proceed with the procedure    Injection Procedure Note:  Diagnosis: DME OD    Patient Identified and Time Out complete  Pt marked  Topical Proparacaine and Betadine.  Inject Eylea OD at 6:00 @ 3.5-4mm posterior to limbus  Post Operative Dx: Same  Complications: None  Follow up as above.    
DISCHARGE

## 2023-03-16 ENCOUNTER — APPOINTMENT (OUTPATIENT)
Dept: NEUROLOGY | Facility: CLINIC | Age: 53
End: 2023-03-16
Payer: MEDICAID

## 2023-03-16 PROCEDURE — 95819 EEG AWAKE AND ASLEEP: CPT

## 2023-03-16 PROCEDURE — 93040 RHYTHM ECG WITH REPORT: CPT

## 2023-03-21 ENCOUNTER — TRANSCRIPTION ENCOUNTER (OUTPATIENT)
Age: 53
End: 2023-03-21

## 2023-03-22 ENCOUNTER — APPOINTMENT (OUTPATIENT)
Dept: GASTROENTEROLOGY | Facility: HOSPITAL | Age: 53
End: 2023-03-22

## 2023-03-22 ENCOUNTER — RESULT REVIEW (OUTPATIENT)
Age: 53
End: 2023-03-22

## 2023-03-22 ENCOUNTER — OUTPATIENT (OUTPATIENT)
Dept: OUTPATIENT SERVICES | Facility: HOSPITAL | Age: 53
LOS: 1 days | End: 2023-03-22
Payer: COMMERCIAL

## 2023-03-22 DIAGNOSIS — R10.9 UNSPECIFIED ABDOMINAL PAIN: ICD-10-CM

## 2023-03-22 DIAGNOSIS — R19.5 OTHER FECAL ABNORMALITIES: ICD-10-CM

## 2023-03-22 DIAGNOSIS — R14.2 ERUCTATION: ICD-10-CM

## 2023-03-22 DIAGNOSIS — R63.4 ABNORMAL WEIGHT LOSS: ICD-10-CM

## 2023-03-22 LAB — GLUCOSE BLDC GLUCOMTR-MCNC: 97 MG/DL — SIGNIFICANT CHANGE UP (ref 70–99)

## 2023-03-22 PROCEDURE — 45380 COLONOSCOPY AND BIOPSY: CPT | Mod: XS

## 2023-03-22 PROCEDURE — 82962 GLUCOSE BLOOD TEST: CPT

## 2023-03-22 PROCEDURE — 88342 IMHCHEM/IMCYTCHM 1ST ANTB: CPT | Mod: 26

## 2023-03-22 PROCEDURE — 88305 TISSUE EXAM BY PATHOLOGIST: CPT

## 2023-03-22 PROCEDURE — 45385 COLONOSCOPY W/LESION REMOVAL: CPT

## 2023-03-22 PROCEDURE — 43239 EGD BIOPSY SINGLE/MULTIPLE: CPT

## 2023-03-22 PROCEDURE — 45385 COLONOSCOPY W/LESION REMOVAL: CPT | Mod: 59

## 2023-03-22 PROCEDURE — 88342 IMHCHEM/IMCYTCHM 1ST ANTB: CPT

## 2023-03-22 PROCEDURE — 88305 TISSUE EXAM BY PATHOLOGIST: CPT | Mod: 26

## 2023-03-22 DEVICE — NAIL OSTEO 1.5X16MM STRL: Type: IMPLANTABLE DEVICE | Status: FUNCTIONAL

## 2023-03-22 NOTE — ASSESSMENT
[FreeTextEntry1] : See anesthesia note for asa and mallampati \par Procedure risk and benefits reviewed with patient

## 2023-03-27 LAB — SURGICAL PATHOLOGY STUDY: SIGNIFICANT CHANGE UP

## 2023-03-28 DIAGNOSIS — D12.6 BENIGN NEOPLASM OF COLON, UNSPECIFIED: ICD-10-CM

## 2023-05-12 ENCOUNTER — APPOINTMENT (OUTPATIENT)
Dept: ENDOCRINOLOGY | Facility: CLINIC | Age: 53
End: 2023-05-12
Payer: MEDICAID

## 2023-05-12 ENCOUNTER — NON-APPOINTMENT (OUTPATIENT)
Age: 53
End: 2023-05-12

## 2023-05-12 VITALS
DIASTOLIC BLOOD PRESSURE: 60 MMHG | BODY MASS INDEX: 28.2 KG/M2 | OXYGEN SATURATION: 98 % | HEART RATE: 98 BPM | HEIGHT: 70 IN | SYSTOLIC BLOOD PRESSURE: 112 MMHG | WEIGHT: 197 LBS

## 2023-05-12 LAB — GLUCOSE BLDC GLUCOMTR-MCNC: 115

## 2023-05-12 PROCEDURE — 82962 GLUCOSE BLOOD TEST: CPT

## 2023-05-12 PROCEDURE — 99214 OFFICE O/P EST MOD 30 MIN: CPT | Mod: 25

## 2023-05-12 NOTE — ASSESSMENT
[FreeTextEntry1] : \par \par PT to see neurology for dizziness- has pending MRI\par \par T2DM\par 1. Increase frequency BS 4x a day- AC/HS-logs given\par 2. Continue Basgalar to 50 units daily\par 3. Continue Ademlog 12 units pre meal \par 4. Continue Stegaltro to 5 mg daily\par -Pt interested in CDE in the future but feels he knows what he has to do and its an access issue due to affordability and transportation\par -Increase exercise as tolerated, goal of 30 mins a day 5x a week\par -Make apts with ophtho, podiatry for yearly evaluation\par \par \par HLD\par -Continue statin, need updated lipid panel\par \par HTN\par -BP stable in office, continue aceI\par \par Vit D Def\par -Continue weekly 50,000 IU supplement\par \par Fasting labs due ASAP\par RTO 3 months

## 2023-05-12 NOTE — HISTORY OF PRESENT ILLNESS
[FreeTextEntry1] : Now following with PCP at Dr. Salced[facility]. \par \par Pt still unable/cannot work/ is not employable at this time due to his poorly controlled diabetes. It would not be safe for him to work, especially with machinery as his hyperglycemia is extremely dangerous. \par \par Dizziness appears to be ruled out as a cardiac issue but now needs to see neurology \par \par T2DM\par Severity: Uncontrolled\par Onset: "thought he had the flu"\par Duration: approx 12-15 years ago\par Modifying Factors: on insulin for approx 2 years\par Associated Symptoms: does not feel well when he is hyperglycemic \par \par FH: Mom, Dad, Brother- T2DM\par \par SMBG\par Checking BS once a day only fasting, rarely twice\par On average 180-200\par Throughout the day some 200s \par Does endorse some hypoglycemia since adding the steglatro\par FS in office 115- fasting\par \par Now using true metrix meter\par \par Need updated HGA1C\par \par Current drug regimen\par Basaglar 50 units once daily-first thing in AM \par Ademlog 12 units pre meal- takes that once to two times a day \par Stegaltro 5 mg daily\par \par I monitored him taking insulin- he does it correctly\par \par Tried Ozempic but was not compliant\par \par Eye exam: last visit 2021- denies DR- complains of blurry vision at this time- overdue\par Foot exam: does not see podiatry, endorses numbness/tingling b/l feet \par Kidney disease: had JUN in hospital, recovered after hydration\par Heart disease: denies \par \par Weight: has lost approx 77 lbs since 2021- unintentional - now stable\par Diet: doesn’t have a great appetite, skips meals at time\par B: egg, cereal , white rolls \par L: fast food- cheeseburger, stopped drinking regular soda\par D: whatever sister makes- pizza, pasta, veggies, meat, rice\par S: does not snack \par Exercise: no energy at this time - feels very weak\par Smoking: A pack of cigarettes every few days\par \par HLD\par -Atorvastatin 40 mg daily\par -Need updated lipid panel\par \par HTN\par -BP in office 112/60\par -Lisinopril 2.5 mg daily\par \par Vit D Def\par -Currently taking 50,000 IU weekly supplement\par -Need updated levels

## 2023-05-12 NOTE — REVIEW OF SYSTEMS
[Fatigue] : fatigue [Recent Weight Gain (___ Lbs)] : no recent weight gain [Recent Weight Loss (___ Lbs)] : no recent weight loss [Visual Field Defect] : no visual field defect [Blurred Vision] : no blurred vision [Dysphagia] : dysphagia [Neck Pain] : no neck pain [Dysphonia] : no dysphonia [Chest Pain] : no chest pain [Shortness Of Breath] : no shortness of breath [Constipation] : no constipation [Diarrhea] : no diarrhea [Polyuria] : no polyuria [Muscle Weakness] : muscle weakness [Dizziness] : dizziness [Pain/Numbness of Digits] : pain/numbness of digits [Polydipsia] : no polydipsia

## 2023-06-03 NOTE — ED ADULT NURSE NOTE - NSFALLRSKASSESSDT_ED_ALL_ED
Bed: H04  Expected date:   Expected time:   Means of arrival:   Comments:  TRIAGE  
ED MD updating Pt using . #[id number]  
Pt provided water and crackers.   
RN reviewed all discharge instructions and follow-up care. Pt agreeable for discharge, denies any further questions or concerns at this time. IV removed.    
04-Dec-2021 08:14

## 2023-07-11 ENCOUNTER — APPOINTMENT (OUTPATIENT)
Dept: MRI IMAGING | Facility: CLINIC | Age: 53
End: 2023-07-11
Payer: MEDICAID

## 2023-07-11 ENCOUNTER — OUTPATIENT (OUTPATIENT)
Dept: OUTPATIENT SERVICES | Facility: HOSPITAL | Age: 53
LOS: 1 days | End: 2023-07-11
Payer: COMMERCIAL

## 2023-07-11 ENCOUNTER — NON-APPOINTMENT (OUTPATIENT)
Age: 53
End: 2023-07-11

## 2023-07-11 DIAGNOSIS — R42 DIZZINESS AND GIDDINESS: ICD-10-CM

## 2023-07-11 DIAGNOSIS — R55 SYNCOPE AND COLLAPSE: ICD-10-CM

## 2023-07-11 PROCEDURE — 70551 MRI BRAIN STEM W/O DYE: CPT | Mod: 26

## 2023-07-11 PROCEDURE — 70551 MRI BRAIN STEM W/O DYE: CPT

## 2023-08-01 ENCOUNTER — APPOINTMENT (OUTPATIENT)
Dept: NEUROLOGY | Facility: CLINIC | Age: 53
End: 2023-08-01

## 2023-08-11 ENCOUNTER — APPOINTMENT (OUTPATIENT)
Dept: ENDOCRINOLOGY | Facility: CLINIC | Age: 53
End: 2023-08-11
Payer: MEDICAID

## 2023-08-11 VITALS
HEIGHT: 70 IN | OXYGEN SATURATION: 96 % | WEIGHT: 198 LBS | DIASTOLIC BLOOD PRESSURE: 76 MMHG | HEART RATE: 90 BPM | BODY MASS INDEX: 28.35 KG/M2 | SYSTOLIC BLOOD PRESSURE: 122 MMHG

## 2023-08-11 LAB — GLUCOSE BLDC GLUCOMTR-MCNC: 137

## 2023-08-11 PROCEDURE — 82962 GLUCOSE BLOOD TEST: CPT

## 2023-08-11 PROCEDURE — 99214 OFFICE O/P EST MOD 30 MIN: CPT | Mod: 25

## 2023-08-11 RX ORDER — INSULIN GLARGINE 100 [IU]/ML
100 INJECTION, SOLUTION SUBCUTANEOUS
Qty: 6 | Refills: 1 | Status: ACTIVE | COMMUNITY
Start: 1900-01-01 | End: 1900-01-01

## 2023-08-11 RX ORDER — POLYETHYLENE GLYCOL-3350 AND ELECTROLYTES WITH FLAVOR PACK 240; 5.84; 2.98; 6.72; 22.72 G/278.26G; G/278.26G; G/278.26G; G/278.26G; G/278.26G
240 POWDER, FOR SOLUTION ORAL
Qty: 1 | Refills: 0 | Status: DISCONTINUED | COMMUNITY
Start: 2023-02-28 | End: 2023-08-11

## 2023-08-11 RX ORDER — INSULIN ASPART 100 [IU]/ML
100 INJECTION, SOLUTION INTRAVENOUS; SUBCUTANEOUS
Qty: 3 | Refills: 0 | Status: ACTIVE | COMMUNITY
Start: 2023-08-11 | End: 1900-01-01

## 2023-08-11 RX ORDER — BLOOD SUGAR DIAGNOSTIC
STRIP MISCELLANEOUS
Qty: 4 | Refills: 1 | Status: ACTIVE | COMMUNITY
Start: 2020-11-04 | End: 1900-01-01

## 2023-08-11 RX ORDER — OMEPRAZOLE 20 MG/1
20 TABLET, DELAYED RELEASE ORAL
Qty: 90 | Refills: 1 | Status: ACTIVE | COMMUNITY
Start: 2023-02-28 | End: 1900-01-01

## 2023-08-11 RX ORDER — LANCETS 28 GAUGE
EACH MISCELLANEOUS
Qty: 4 | Refills: 0 | Status: ACTIVE | COMMUNITY
Start: 2020-08-31

## 2023-08-11 RX ORDER — PEN NEEDLE, DIABETIC 32GX 5/32"
32G X 4 MM NEEDLE, DISPOSABLE MISCELLANEOUS
Qty: 4 | Refills: 1 | Status: ACTIVE | COMMUNITY
Start: 2020-10-21 | End: 1900-01-01

## 2023-08-11 RX ORDER — 70%ISOPROPYL ALCOHOL 0.7 ML/ML
70 SWAB TOPICAL
Qty: 2 | Refills: 2 | Status: ACTIVE | COMMUNITY
Start: 2021-02-03 | End: 1900-01-01

## 2023-08-11 NOTE — ASSESSMENT
[FreeTextEntry1] : Will use [facility] to faciliate a consult for potential vertigo due to dizziness of unclear etiology however he must check blood sugars during episodes to rule out hypo/hyperglycemia.  T2DM 1. Increase frequency BS 4x a day- AC/HS-logs given and new meter sent 2. Continue Basgalar to 50 units daily 3. Continue Ademlog 10 units pre meal  4. Continue Stegaltro to 5 mg daily -Pt interested in CDE in the future but feels he knows what he has to do and its an access issue due to affordability and transportation (lives in shelter) -Increase exercise as tolerated, goal of 30 mins a day 5x a week -Make apts with ophtho, podiatry for yearly evaluation   HLD -Continue statin, need updated lipid panel  HTN -BP stable in office, continue aceI  Vit D Def -Continue weekly 50,000 IU supplement  Fasting labs due ASAP-pt has apt with pcp  next week - needs labs there to be sent over to us RTO 3 months

## 2023-08-11 NOTE — HISTORY OF PRESENT ILLNESS
[FreeTextEntry1] : Now following with PCP at Dr. Salcedo [facility].   Dizziness appears to be ruled out as a cardiac issue and also not neuro related . He admits he doesnt always check a blood sugar when he feels dizzy.  T2DM Severity: Uncontrolled Onset: "thought he had the flu" Duration: approx 12-15 years ago Modifying Factors: on insulin for approx 2 years Associated Symptoms: does not feel well when he is hyperglycemic   FH: Mom, Dad, Brother- T2DM  SMBG Does not have a working meter FS in office 137  Need updated HGA1C  Current drug regimen Basaglar 50 units once daily-first thing in AM  Ademlog 10 units pre meal- takes that once to two times a day as he doesnt eat 3 meals a day Stegaltro 5 mg daily  I monitored him taking insulin- he does it correctly  Tried Ozempic but was not compliant  Eye exam: last visit 2021- denies DR- complains of blurry vision at this time- overdue Foot exam: does not see podiatry, endorses numbness/tingling b/l feet  Kidney disease: had JUN in hospital, recovered after hydration Heart disease: denies   Weight: has lost approx 80 lbs since 2021- unintentional - now stable Diet: doesn't have a great appetite, skips meals at time B: egg, cereal , white rolls  L: fast food- cheeseburger, stopped drinking regular soda D: whatever sister makes- pizza, pasta, veggies, meat, rice S: does not snack  Exercise: no energy at this time - feels very weak Smoking: A pack of cigarettes every few days  HLD -Atorvastatin 40 mg daily -Need updated lipid panel  HTN -BP in office 122/76 -Lisinopril 2.5 mg daily  Vit D Def -Currently taking 50,000 IU weekly supplement -Need updated levels

## 2023-08-11 NOTE — REVIEW OF SYSTEMS
[Fatigue] : fatigue [Recent Weight Gain (___ Lbs)] : no recent weight gain [Recent Weight Loss (___ Lbs)] : recent weight loss: [unfilled] lbs [Visual Field Defect] : no visual field defect [Blurred Vision] : no blurred vision [Dysphagia] : no dysphagia [Neck Pain] : no neck pain [Dysphonia] : no dysphonia [Chest Pain] : no chest pain [Shortness Of Breath] : no shortness of breath [Constipation] : no constipation [Diarrhea] : no diarrhea [Polyuria] : no polyuria [Dizziness] : dizziness [Pain/Numbness of Digits] : pain/numbness of digits [Polydipsia] : no polydipsia

## 2023-09-29 ENCOUNTER — APPOINTMENT (OUTPATIENT)
Dept: ENDOCRINOLOGY | Facility: CLINIC | Age: 53
End: 2023-09-29
Payer: MEDICAID

## 2023-09-29 PROCEDURE — 99214 OFFICE O/P EST MOD 30 MIN: CPT | Mod: 95

## 2023-09-29 RX ORDER — ERGOCALCIFEROL 1.25 MG/1
1.25 MG CAPSULE ORAL
Qty: 12 | Refills: 1 | Status: ACTIVE | COMMUNITY
Start: 2020-12-01 | End: 1900-01-01

## 2023-11-08 ENCOUNTER — APPOINTMENT (OUTPATIENT)
Dept: OTOLARYNGOLOGY | Facility: CLINIC | Age: 53
End: 2023-11-08
Payer: MEDICAID

## 2023-11-08 VITALS — WEIGHT: 200 LBS | HEIGHT: 70 IN | TEMPERATURE: 97.9 F | BODY MASS INDEX: 28.63 KG/M2

## 2023-11-08 DIAGNOSIS — R42 DIZZINESS AND GIDDINESS: ICD-10-CM

## 2023-11-08 DIAGNOSIS — H90.3 SENSORINEURAL HEARING LOSS, BILATERAL: ICD-10-CM

## 2023-11-08 PROCEDURE — 92567 TYMPANOMETRY: CPT

## 2023-11-08 PROCEDURE — 92557 COMPREHENSIVE HEARING TEST: CPT

## 2023-11-08 PROCEDURE — 99204 OFFICE O/P NEW MOD 45 MIN: CPT

## 2023-11-17 ENCOUNTER — APPOINTMENT (OUTPATIENT)
Dept: OTOLARYNGOLOGY | Facility: CLINIC | Age: 53
End: 2023-11-17
Payer: MEDICAID

## 2023-11-17 PROCEDURE — 92540 BASIC VESTIBULAR EVALUATION: CPT

## 2023-11-20 ENCOUNTER — NON-APPOINTMENT (OUTPATIENT)
Age: 53
End: 2023-11-20

## 2023-11-21 NOTE — PATIENT PROFILE ADULT - NSPRONUTRITIONRISK_GEN_A_NUR
----- Message from Anastasia De Leon sent at 11/21/2023 10:57 AM CST -----  Regarding: head pain ,sooner appt  Patient was schedule for hospital follow up 11/29,appt was canceled to Dr Barroso out office,patient is screaming because she is having bad head pain ,loss vision , its been over a month since out hospital . patient states she went to family doctor and eye doctor and need to see someone asap. I offered next opening in Jan 2024 she said she need to be seen sooner ,Please call patient and talk to Dr Barroso     
Patient is a hosp f/u. Never been seen in clinic. Patients first f/u was scheduled 11/29/23 but is cancelled due to  being in procedures. Patient now scheduled for 12/11/23 @ 1:15.p.m.          Spoke with patient. Patient states HA located on left side of head. Describes pain as aching. Also states throbbing in both ears. Patient states currently taking Norco that is prescribed by PCP. Patient also states permeant vision loss in right eye. Advised patient to report to ED if pain becomes unbearable in the meantime.    
No indicators present

## 2023-11-28 NOTE — ED PROVIDER NOTE - CARE PLAN
Render In Strict Bullet Format?: No Detail Level: Simple Discontinue Regimen: Efudex 5% to brow area 1 Principal Discharge DX:	Hyperglycemia  Secondary Diagnosis:	Pancreatitis  Secondary Diagnosis:	Adenovirus infection

## 2023-11-29 ENCOUNTER — RX RENEWAL (OUTPATIENT)
Age: 53
End: 2023-11-29

## 2023-11-29 RX ORDER — INSULIN GLARGINE 100 [IU]/ML
100 INJECTION, SOLUTION SUBCUTANEOUS DAILY
Qty: 6 | Refills: 1 | Status: ACTIVE | COMMUNITY
Start: 2023-11-29 | End: 1900-01-01

## 2023-12-08 RX ORDER — ERTUGLIFLOZIN 15 MG/1
15 TABLET, FILM COATED ORAL
Qty: 90 | Refills: 1 | Status: DISCONTINUED | COMMUNITY
Start: 2023-11-29 | End: 2023-12-08

## 2023-12-18 RX ORDER — ERTUGLIFLOZIN 5 MG/1
5 TABLET, FILM COATED ORAL
Qty: 90 | Refills: 1 | Status: DISCONTINUED | COMMUNITY
Start: 2022-10-25 | End: 2023-12-18

## 2023-12-18 RX ORDER — INSULIN LISPRO 100 [IU]/ML
100 INJECTION, SOLUTION INTRAVENOUS; SUBCUTANEOUS
Qty: 2 | Refills: 1 | Status: ACTIVE | COMMUNITY
Start: 1900-01-01 | End: 1900-01-01

## 2023-12-24 NOTE — ED PROVIDER NOTE - NS ED MD EM SELECTION
Contacted the patient to perform Medication Therapy Management review, specifically in reference to refilling pravastatin to improve PDC for HEDIS measurements. Waiting for patient response to verify    [id number] Comprehensive

## 2024-01-09 NOTE — REASON FOR VISIT
[Follow - Up] : a follow-up visit [DM Type 2] : DM Type 2 on the discharge service for the patient. I have reviewed and made amendments to the documentation where necessary.

## 2024-01-10 NOTE — CONSULT NOTE ADULT - ASSESSMENT
46y/o male with hx of DM2, briefly on Metformin years ago but didn't follow up with PMD in years, presented with polyuria, polydipsia, N/V, found to have DKA. Pt was hydrated with IV fluids, initially placed on insulin gtt, now AG closed and pt transitioned to Lantus and Humalog.   Pt endorses burning, intermittent chest pain for 3-4 weeks, last felt about a week ago, non-radiating, not related to any activity or eating, associated with some SOB. Troponin negative x3, EKG shows SR with non-specific ST changes in anterior leads. TTE unremarkable, normal EF. Pt is a current daily cigarette/occasional marihuana smoker and has a family hx of CAD (father).
What Type Of Note Output Would You Prefer (Optional)?: Bullet Format
How Severe Is Your Skin Lesion?: moderate
47 year old male with type 2 DM, currently uncontrolled related to noncompliance with diet and medications admitted with hyperglycemia and mild acidosis likely due to starvation ketosis +/- mild DKA.  Due to degree of hyperglycemia and acidosis, insulin therapy is indicated.      -  for now will increase Lantus to 24 units  - start standing humalog 8 units with meals.   -  prior to discharge would recommend conversion to regimen of novolin 70/30 BID for affordability as patient does not have drug Rx plan.  -  could consider adding back metformin prior to discharge.  - needs diabetic education RE SMBG and insulin administration prior to D/C.
Has Your Skin Lesion Been Treated?: not been treated
Is This A New Presentation, Or A Follow-Up?: Skin Lesions

## 2024-01-17 ENCOUNTER — OFFICE (OUTPATIENT)
Dept: URBAN - METROPOLITAN AREA CLINIC 94 | Facility: CLINIC | Age: 54
Setting detail: OPHTHALMOLOGY
End: 2024-01-17
Payer: MEDICAID

## 2024-01-17 DIAGNOSIS — H47.233: ICD-10-CM

## 2024-01-17 DIAGNOSIS — E11.3293: ICD-10-CM

## 2024-01-17 DIAGNOSIS — H16.223: ICD-10-CM

## 2024-01-17 PROCEDURE — 92250 FUNDUS PHOTOGRAPHY W/I&R: CPT | Performed by: PHYSICIAN ASSISTANT

## 2024-01-17 PROCEDURE — 92004 COMPRE OPH EXAM NEW PT 1/>: CPT | Performed by: PHYSICIAN ASSISTANT

## 2024-01-17 ASSESSMENT — SPHEQUIV_DERIVED
OD_SPHEQUIV: -3.375
OS_SPHEQUIV: -3.75
OS_SPHEQUIV: -4.25
OD_SPHEQUIV: -3.25

## 2024-01-17 ASSESSMENT — REFRACTION_AUTOREFRACTION
OS_SPHERE: -3.75
OS_AXIS: 087
OD_CYLINDER: -1.25
OD_SPHERE: -2.75
OD_AXIS: 093
OS_CYLINDER: -1.00

## 2024-01-17 ASSESSMENT — CONFRONTATIONAL VISUAL FIELD TEST (CVF)
OS_FINDINGS: FULL
OD_FINDINGS: FULL

## 2024-01-17 ASSESSMENT — REFRACTION_MANIFEST
OD_CYLINDER: -1.00
OS_VA1: 20/25
OS_AXIS: 087
OS_CYLINDER: -1.00
OD_AXIS: 093
OS_SPHERE: -3.25
OD_SPHERE: -2.75
OD_VA1: 20/25

## 2024-01-17 ASSESSMENT — SUPERFICIAL PUNCTATE KERATITIS (SPK)
OS_SPK: 1+
OD_SPK: 1+

## 2024-01-25 LAB
HBA1C MFR BLD HPLC: 8.4
LDLC SERPL DIRECT ASSAY-MCNC: 68

## 2024-01-26 ENCOUNTER — APPOINTMENT (OUTPATIENT)
Dept: ENDOCRINOLOGY | Facility: CLINIC | Age: 54
End: 2024-01-26
Payer: MEDICAID

## 2024-01-26 VITALS
DIASTOLIC BLOOD PRESSURE: 82 MMHG | SYSTOLIC BLOOD PRESSURE: 122 MMHG | BODY MASS INDEX: 30.64 KG/M2 | WEIGHT: 214 LBS | HEART RATE: 85 BPM | HEIGHT: 70 IN

## 2024-01-26 DIAGNOSIS — E78.5 HYPERLIPIDEMIA, UNSPECIFIED: ICD-10-CM

## 2024-01-26 DIAGNOSIS — E11.9 TYPE 2 DIABETES MELLITUS W/OUT COMPLICATIONS: ICD-10-CM

## 2024-01-26 DIAGNOSIS — E55.9 VITAMIN D DEFICIENCY, UNSPECIFIED: ICD-10-CM

## 2024-01-26 DIAGNOSIS — I10 ESSENTIAL (PRIMARY) HYPERTENSION: ICD-10-CM

## 2024-01-26 LAB — GLUCOSE BLDC GLUCOMTR-MCNC: 130

## 2024-01-26 PROCEDURE — G2211 COMPLEX E/M VISIT ADD ON: CPT

## 2024-01-26 PROCEDURE — 99214 OFFICE O/P EST MOD 30 MIN: CPT

## 2024-01-26 PROCEDURE — 82962 GLUCOSE BLOOD TEST: CPT

## 2024-01-26 NOTE — REVIEW OF SYSTEMS
[Fatigue] : fatigue [Decreased Appetite] : decreased appetite [Recent Weight Gain (___ Lbs)] : recent weight gain: [unfilled] lbs [Recent Weight Loss (___ Lbs)] : no recent weight loss [Visual Field Defect] : visual field defect [Blurred Vision] : blurred vision [Dysphagia] : no dysphagia [Neck Pain] : no neck pain [Dysphonia] : no dysphonia [Chest Pain] : no chest pain [Shortness Of Breath] : no shortness of breath [Constipation] : no constipation [Diarrhea] : no diarrhea [Polyuria] : no polyuria [Dizziness] : dizziness [Poor Balance] : poor balance [Depression] : depression [Polydipsia] : no polydipsia

## 2024-01-26 NOTE — HISTORY OF PRESENT ILLNESS
[FreeTextEntry1] : Now following with PCP at Dr. Salcedo [facility].   Dizziness appears to be ruled out as a cardiac issue and also not neuro related . He has also ruled out hypoglycemia. Did not qualify for vestibular therapy. Now seeing ophtho.   T2DM Severity: Uncontrolled Onset: "thought he had the flu" Duration: approx 12-15 years ago Modifying Factors: on insulin for approx 2 years Associated Symptoms: does not feel well when he is hyperglycemic   FH: Mom, Dad, Brother- T2DM  SMBG Has been checking blood sugars  On average 170s FS in office 130- apple Denies hypoglycemia    HGA1C 8.4- 1/2024  Current drug regimen Basaglar 50 units once daily-first thing in AM - rarely missing  Ademlog 8-12 units pre meal- misses 3 days a week -also giving after meals  Farxiga 10 mg daily- has not been using  I monitored him taking insulin- he does it correctly  Tried Ozempic but was not compliant  Eye exam: last visit 1/2024, [place] monday- complains of blurry vision at this time, likely getting lazer on monday Foot exam: does not see podiatry, endorses numbness/tingling b/l feet  Kidney disease: had JUN in hospital, recovered after hydration Heart disease: denies   Weight: has lost approx 80 lbs since 2021- unintentional - now gained 14 lbs  Diet: doesn't have a great appetite, skips meals at time B: egg, cereal , white rolls  L: fast food- cheeseburger, stopped drinking regular soda D: whatever sister makes- pizza, pasta, veggies, meat, rice S: does not snack  Exercise: no energy at this time - feels very weak Smoking: A pack of cigarettes every few days  HLD -Atorvastatin 40 mg daily -Total cholesterol 164, Triglycerides 196, LDL 68, HDL 57  HTN -BP in office 122/82 -Lisinopril 2.5 mg daily  Vit D Def -Currently taking 50,000 IU weekly supplement -Low on labs

## 2024-01-26 NOTE — ASSESSMENT
[FreeTextEntry1] : T2DM 1. Increase frequency BS 4x a day- AC/HS. Must check blood sugars when he has dizzy episodes also. 2. Continue Basgalar to 50 units daily 3. Must take admelog 8-12 units 100% of the time- 5-15 mins pre meal!!  4. He has not been on an SGLT2 ----will inquire which is covered and what the issue is -Pt interested in CDE in the future but feels he knows what he has to do and its an access issue due to affordability and transportation (lives in shelter) -Increase exercise as tolerated, goal of 30 mins a day 5x a week -Make apts with ophtho, podiatry for yearly evaluation   HLD -Continue statin, triglycerides slightly elevated   HTN - Continue aceI  Vit D Def -Continue weekly 50,000 IU supplement  RTO 6 weeks

## 2024-01-29 ENCOUNTER — OFFICE (OUTPATIENT)
Dept: URBAN - METROPOLITAN AREA CLINIC 94 | Facility: CLINIC | Age: 54
Setting detail: OPHTHALMOLOGY
End: 2024-01-29
Payer: MEDICAID

## 2024-01-29 DIAGNOSIS — H47.233: ICD-10-CM

## 2024-01-29 DIAGNOSIS — E11.3293: ICD-10-CM

## 2024-01-29 DIAGNOSIS — H31.013: ICD-10-CM

## 2024-01-29 PROBLEM — H16.223 DRY EYE SYNDROME K SICCA; BOTH EYES: Status: ACTIVE | Noted: 2024-01-17

## 2024-01-29 PROCEDURE — 92014 COMPRE OPH EXAM EST PT 1/>: CPT | Performed by: SPECIALIST

## 2024-01-29 PROCEDURE — 92134 CPTRZ OPH DX IMG PST SGM RTA: CPT | Performed by: SPECIALIST

## 2024-01-29 PROCEDURE — 92235 FLUORESCEIN ANGRPH MLTIFRAME: CPT | Performed by: SPECIALIST

## 2024-01-29 ASSESSMENT — REFRACTION_AUTOREFRACTION
OS_SPHERE: -3.75
OD_CYLINDER: -1.25
OS_AXIS: 087
OS_CYLINDER: -1.00
OD_AXIS: 093
OD_SPHERE: -2.75

## 2024-01-29 ASSESSMENT — SUPERFICIAL PUNCTATE KERATITIS (SPK)
OS_SPK: 1+
OD_SPK: 1+

## 2024-01-29 ASSESSMENT — CONFRONTATIONAL VISUAL FIELD TEST (CVF)
OS_FINDINGS: FULL
OD_FINDINGS: FULL

## 2024-01-29 ASSESSMENT — SPHEQUIV_DERIVED
OS_SPHEQUIV: -4.25
OD_SPHEQUIV: -3.375

## 2024-01-30 RX ORDER — DAPAGLIFLOZIN 10 MG/1
10 TABLET, FILM COATED ORAL DAILY
Qty: 90 | Refills: 1 | Status: ACTIVE | COMMUNITY
Start: 2023-12-18

## 2024-03-11 ENCOUNTER — OFFICE (OUTPATIENT)
Dept: URBAN - METROPOLITAN AREA CLINIC 94 | Facility: CLINIC | Age: 54
Setting detail: OPHTHALMOLOGY
End: 2024-03-11
Payer: MEDICAID

## 2024-03-11 DIAGNOSIS — H47.233: ICD-10-CM

## 2024-03-11 DIAGNOSIS — H25.13: ICD-10-CM

## 2024-03-11 DIAGNOSIS — H16.223: ICD-10-CM

## 2024-03-11 PROCEDURE — 92133 CPTRZD OPH DX IMG PST SGM ON: CPT | Performed by: PHYSICIAN ASSISTANT

## 2024-03-11 PROCEDURE — 99213 OFFICE O/P EST LOW 20 MIN: CPT | Performed by: PHYSICIAN ASSISTANT

## 2024-03-11 ASSESSMENT — SPHEQUIV_DERIVED
OD_SPHEQUIV: -2.625
OS_SPHEQUIV: -2.625

## 2024-03-11 ASSESSMENT — REFRACTION_MANIFEST
OD_SPHERE: -2.00
OD_AXIS: 090
OS_SPHERE: -2.00
OD_VA1: 20/40
OD_CYLINDER: -1.25
OS_CYLINDER: -1.25
OS_AXIS: 090
OS_VA1: 20/40

## 2024-05-02 ENCOUNTER — OFFICE (OUTPATIENT)
Dept: URBAN - METROPOLITAN AREA CLINIC 94 | Facility: CLINIC | Age: 54
Setting detail: OPHTHALMOLOGY
End: 2024-05-02
Payer: COMMERCIAL

## 2024-05-02 DIAGNOSIS — H25.13: ICD-10-CM

## 2024-05-02 DIAGNOSIS — H16.223: ICD-10-CM

## 2024-05-02 DIAGNOSIS — H47.233: ICD-10-CM

## 2024-05-02 PROBLEM — H52.13 MYOPIA; BOTH EYES: Status: ACTIVE | Noted: 2024-05-02

## 2024-05-02 PROCEDURE — 92014 COMPRE OPH EXAM EST PT 1/>: CPT | Performed by: OPTOMETRIST

## 2024-05-02 ASSESSMENT — CONFRONTATIONAL VISUAL FIELD TEST (CVF)
OD_FINDINGS: FULL
OS_FINDINGS: FULL

## 2024-05-31 RX ORDER — SEMAGLUTIDE 0.68 MG/ML
2 INJECTION, SOLUTION SUBCUTANEOUS
Qty: 3 | Refills: 0 | Status: ACTIVE | COMMUNITY
Start: 2024-03-11 | End: 1900-01-01

## 2024-09-06 ENCOUNTER — APPOINTMENT (OUTPATIENT)
Dept: ENDOCRINOLOGY | Facility: CLINIC | Age: 54
End: 2024-09-06

## 2024-10-02 RX ORDER — LANCETS
EACH MISCELLANEOUS
Qty: 400 | Refills: 1 | Status: ACTIVE | COMMUNITY
Start: 2024-10-01 | End: 1900-01-01

## 2024-10-02 RX ORDER — ISOPROPYL ALCOHOL 0.7 ML/ML
SWAB TOPICAL
Qty: 400 | Refills: 3 | Status: ACTIVE | COMMUNITY
Start: 2024-10-01 | End: 1900-01-01

## 2024-10-02 RX ORDER — SYRING-NEEDL,DISP,INSUL,0.3 ML 30 GX5/16"
SYRINGE, EMPTY DISPOSABLE MISCELLANEOUS
Qty: 1 | Refills: 0 | Status: ACTIVE | COMMUNITY
Start: 2024-10-01 | End: 1900-01-01

## 2024-10-02 RX ORDER — BLOOD-GLUCOSE METER
W/DEVICE KIT MISCELLANEOUS
Qty: 1 | Refills: 0 | Status: ACTIVE | COMMUNITY
Start: 2024-10-01 | End: 1900-01-01

## 2024-10-18 ENCOUNTER — APPOINTMENT (OUTPATIENT)
Dept: ENDOCRINOLOGY | Facility: CLINIC | Age: 54
End: 2024-10-18

## 2024-12-04 ENCOUNTER — APPOINTMENT (OUTPATIENT)
Dept: GASTROENTEROLOGY | Facility: CLINIC | Age: 54
End: 2024-12-04
Payer: MEDICARE

## 2024-12-04 VITALS
HEART RATE: 93 BPM | HEIGHT: 70 IN | WEIGHT: 228 LBS | BODY MASS INDEX: 32.64 KG/M2 | DIASTOLIC BLOOD PRESSURE: 79 MMHG | OXYGEN SATURATION: 99 % | SYSTOLIC BLOOD PRESSURE: 117 MMHG

## 2024-12-04 DIAGNOSIS — K31.84 GASTROPARESIS: ICD-10-CM

## 2024-12-04 DIAGNOSIS — R14.2 ERUCTATION: ICD-10-CM

## 2024-12-04 PROCEDURE — 99205 OFFICE O/P NEW HI 60 MIN: CPT

## 2024-12-04 RX ORDER — QUETIAPINE FUMARATE 50 MG/1
50 TABLET ORAL
Refills: 0 | Status: ACTIVE | COMMUNITY

## 2024-12-04 RX ORDER — DIVALPROEX SODIUM 500 MG/1
500 TABLET, DELAYED RELEASE ORAL
Refills: 0 | Status: ACTIVE | COMMUNITY

## 2024-12-04 RX ORDER — OMEPRAZOLE 40 MG/1
40 CAPSULE, DELAYED RELEASE ORAL
Qty: 30 | Refills: 5 | Status: ACTIVE | COMMUNITY
Start: 2024-12-04 | End: 1900-01-01

## 2024-12-04 RX ORDER — DOCUSATE SODIUM 100 MG
100 TABLET ORAL
Refills: 0 | Status: ACTIVE | COMMUNITY

## 2024-12-04 RX ORDER — BENZTROPINE MESYLATE 1 MG/1
1 TABLET ORAL
Refills: 0 | Status: ACTIVE | COMMUNITY

## 2024-12-04 RX ORDER — GABAPENTIN 300 MG/1
300 CAPSULE ORAL
Refills: 0 | Status: ACTIVE | COMMUNITY

## 2024-12-04 RX ORDER — HALOPERIDOL 10 MG/1
10 TABLET ORAL
Refills: 0 | Status: ACTIVE | COMMUNITY

## 2024-12-04 RX ORDER — ASPIRIN ENTERIC COATED TABLETS 81 MG 81 MG/1
81 TABLET, DELAYED RELEASE ORAL
Refills: 0 | Status: ACTIVE | COMMUNITY

## 2024-12-31 ENCOUNTER — APPOINTMENT (OUTPATIENT)
Dept: ENDOCRINOLOGY | Facility: CLINIC | Age: 54
End: 2024-12-31

## 2025-01-17 ENCOUNTER — APPOINTMENT (OUTPATIENT)
Dept: ORTHOPEDIC SURGERY | Facility: CLINIC | Age: 55
End: 2025-01-17
Payer: MEDICARE

## 2025-01-17 VITALS
WEIGHT: 240 LBS | HEIGHT: 70 IN | BODY MASS INDEX: 34.36 KG/M2 | DIASTOLIC BLOOD PRESSURE: 97 MMHG | SYSTOLIC BLOOD PRESSURE: 160 MMHG

## 2025-01-17 DIAGNOSIS — S62.367A NONDISPLACED FRACTURE OF NECK OF FIFTH METACARPAL BONE, LEFT HAND, INITIAL ENCOUNTER FOR CLOSED FRACTURE: ICD-10-CM

## 2025-01-17 DIAGNOSIS — M79.642 PAIN IN LEFT HAND: ICD-10-CM

## 2025-01-17 PROCEDURE — 99204 OFFICE O/P NEW MOD 45 MIN: CPT

## 2025-01-17 PROCEDURE — 73130 X-RAY EXAM OF HAND: CPT | Mod: 50

## 2025-01-17 RX ORDER — MELOXICAM 15 MG/1
15 TABLET ORAL DAILY
Qty: 14 | Refills: 0 | Status: ACTIVE | COMMUNITY
Start: 2025-01-17 | End: 1900-01-01

## 2025-02-03 ENCOUNTER — NON-APPOINTMENT (OUTPATIENT)
Age: 55
End: 2025-02-03

## 2025-02-03 ENCOUNTER — APPOINTMENT (OUTPATIENT)
Dept: ENDOCRINOLOGY | Facility: CLINIC | Age: 55
End: 2025-02-03

## 2025-02-03 VITALS
OXYGEN SATURATION: 98 % | BODY MASS INDEX: 33.07 KG/M2 | HEART RATE: 84 BPM | SYSTOLIC BLOOD PRESSURE: 120 MMHG | DIASTOLIC BLOOD PRESSURE: 80 MMHG | WEIGHT: 231 LBS | HEIGHT: 70 IN

## 2025-02-03 DIAGNOSIS — E78.5 HYPERLIPIDEMIA, UNSPECIFIED: ICD-10-CM

## 2025-02-03 DIAGNOSIS — E11.9 TYPE 2 DIABETES MELLITUS W/OUT COMPLICATIONS: ICD-10-CM

## 2025-02-03 DIAGNOSIS — E55.9 VITAMIN D DEFICIENCY, UNSPECIFIED: ICD-10-CM

## 2025-02-03 DIAGNOSIS — I10 ESSENTIAL (PRIMARY) HYPERTENSION: ICD-10-CM

## 2025-02-03 LAB — GLUCOSE BLDC GLUCOMTR-MCNC: 364

## 2025-02-03 PROCEDURE — 82962 GLUCOSE BLOOD TEST: CPT

## 2025-02-03 PROCEDURE — 99214 OFFICE O/P EST MOD 30 MIN: CPT

## 2025-02-03 PROCEDURE — G2211 COMPLEX E/M VISIT ADD ON: CPT

## 2025-04-28 ENCOUNTER — RX RENEWAL (OUTPATIENT)
Age: 55
End: 2025-04-28

## 2025-04-29 ENCOUNTER — APPOINTMENT (OUTPATIENT)
Dept: ENDOCRINOLOGY | Facility: CLINIC | Age: 55
End: 2025-04-29

## 2025-04-29 DIAGNOSIS — I10 ESSENTIAL (PRIMARY) HYPERTENSION: ICD-10-CM

## 2025-04-29 DIAGNOSIS — E55.9 VITAMIN D DEFICIENCY, UNSPECIFIED: ICD-10-CM

## 2025-04-29 DIAGNOSIS — E11.9 TYPE 2 DIABETES MELLITUS W/OUT COMPLICATIONS: ICD-10-CM

## 2025-04-29 DIAGNOSIS — R63.4 ABNORMAL WEIGHT LOSS: ICD-10-CM

## 2025-04-29 DIAGNOSIS — E78.5 HYPERLIPIDEMIA, UNSPECIFIED: ICD-10-CM

## 2025-05-05 ENCOUNTER — RX RENEWAL (OUTPATIENT)
Age: 55
End: 2025-05-05

## 2025-06-14 NOTE — HISTORY OF PRESENT ILLNESS
[FreeTextEntry1] : Pt lost to follow up since May 2021. \par \par T2DM\par Severity: Uncontrolled\par Onset: "thought he had the flu"\par Duration: approx 12-15 years ago\par Modifying Factors: on insulin for approx 2 years\par Associated Symptoms: does not feel well when he is hyperglycemic \par \par FH: Mom, Dad, Brother- T2DM\par \par SMBG\par Checking BS 1x a week\par On average 400s and 500s\par Denies hypoglycemic events\par FS in office 522 , gave 10 units of Fiasp into abdomen- EXP 02/2023 [id number], repeat \par \par Current drug regimen\par Basaglar 50 units once daily- now taking appropriately \par Ademlog 15-20 units pre meal, sometimes forgets but gives at least 2x a day\par Ozempic 0.5 mg weekly -has not had this for 2 months\par \par Eye exam: last visit 2021- denies DR- complains of blurry vision at this time\par Foot exam: does not see podiatry, denies numbness/tingling b/l feet \par Kidney disease: had JUN in hospital, recovered after hydration\par Heart disease: denies \par \par Weight: has lost approx 40 lbs since 2021\par Diet: doesn’t have a great appetite, skips meals at time\par B: egg, cereal , white rolls \par L: fast food- cheeseburger, stopped drinking regular soda\par D: whatever sister makes- pizza, pasta, veggies, meat, rice\par S: does not snack \par Exercise: no energy at this time - feels very weak\par Smoking: A pack of cigarettes every few days\par \par HLD\par -Atorvastatin 40 mg daily\par -Need updated lipid panel\par \par HTN\par -BP in office 108/70\par -Lisinopril 2.5 mg daily\par \par Vit D Def\par -Currently taking 50,000 IU weekly supplement\par -Need updated levels 8 (severe pain)

## (undated) DEVICE — DRSG 2X2

## (undated) DEVICE — SYR IV FLUSH SALINE 10ML 30/TY

## (undated) DEVICE — SYR LUER SLIP TIP 50CC

## (undated) DEVICE — Device

## (undated) DEVICE — DENTURE CUP PINK

## (undated) DEVICE — TUBING ALARIS PUMP MODULE NON-DEHP

## (undated) DEVICE — MASK PROC EAR LOOP

## (undated) DEVICE — VENODYNE/SCD SLEEVE CALF MEDIUM

## (undated) DEVICE — UNDERPAD LINEN SAVER 23 X 36"

## (undated) DEVICE — SOL IRR BAG NS 0.9% 1000ML

## (undated) DEVICE — SOL BAG NS 0.9% 1000ML

## (undated) DEVICE — TUBING IV EXTENSION MACRO W CLAVE 7"

## (undated) DEVICE — WARMING BLANKET FULL ADULT

## (undated) DEVICE — SYR SLIP 10CC

## (undated) DEVICE — ELCTR GROUNDING PAD ADULT COVIDIEN

## (undated) DEVICE — PACK IV START WITH CHG

## (undated) DEVICE — FORCEP RADIAL JAW 4 PEDIATRIC W NDL 1.8MM 2MM 160CM DISP

## (undated) DEVICE — SSH-ERBE RM1 11351341: Type: DURABLE MEDICAL EQUIPMENT

## (undated) DEVICE — FORCEP RADIAL JAW 4 240CM DISP

## (undated) DEVICE — GOWN IMPERV XL

## (undated) DEVICE — CATH IV SAFE BC 22G X 1" (BLUE)

## (undated) DEVICE — BITE BLOCK ADULT 20 X 27MM (GREEN)

## (undated) DEVICE — DRSG CURITY GAUZE SPONGE 4 X 4" 12-PLY NON-STERILE

## (undated) DEVICE — SENSOR O2 FINGER ADULT

## (undated) DEVICE — SNARE POLYP HEXAGONAL SM 13X2.4X240

## (undated) DEVICE — SOL IRR BAG H2O 1000ML

## (undated) DEVICE — POLY TRAP ETRAP

## (undated) DEVICE — FORCEP RADIAL JAW 4 W NDL 2.4MM 2.8MM 240CM ORANGE DISP